# Patient Record
Sex: FEMALE | Race: WHITE | Employment: FULL TIME | URBAN - METROPOLITAN AREA
[De-identification: names, ages, dates, MRNs, and addresses within clinical notes are randomized per-mention and may not be internally consistent; named-entity substitution may affect disease eponyms.]

---

## 2018-07-21 ENCOUNTER — HOSPITAL ENCOUNTER (OUTPATIENT)
Age: 48
Discharge: HOME | End: 2018-07-21
Payer: COMMERCIAL

## 2018-07-21 LAB
ALT SERPL-CCNC: 48 U/L (ref 0–32)
AST SERPL-CCNC: 84 U/L (ref 0–31)
CREAT SERPL-MCNC: 0.7 MG/DL (ref 0.5–1)
GFR AFRICAN AMERICAN: >60
GFR NON-AFRICAN AMERICAN: >60 ML/MIN/1.73
HCT VFR BLD CALC: 38.8 % (ref 34–48)
HEMOGLOBIN: 13.5 G/DL (ref 11.5–15.5)
MCH RBC QN AUTO: 31.9 PG (ref 26–35)
MCHC RBC AUTO-ENTMCNC: 34.8 % (ref 32–34.5)
MCV RBC AUTO: 91.7 FL (ref 80–99.9)
PDW BLD-RTO: 13.2 FL (ref 11.5–15)
PLATELET # BLD: 332 E9/L (ref 130–450)
PMV BLD AUTO: 10.3 FL (ref 7–12)
RBC # BLD: 4.23 E12/L (ref 3.5–5.5)
SEDIMENTATION RATE, ERYTHROCYTE: 17 MM/HR (ref 0–20)
WBC # BLD: 7.4 E9/L (ref 4.5–11.5)

## 2018-07-21 PROCEDURE — 82565 ASSAY OF CREATININE: CPT

## 2018-07-21 PROCEDURE — 84450 TRANSFERASE (AST) (SGOT): CPT

## 2018-07-21 PROCEDURE — 84460 ALANINE AMINO (ALT) (SGPT): CPT

## 2018-07-21 PROCEDURE — 85027 COMPLETE CBC AUTOMATED: CPT

## 2018-07-21 PROCEDURE — 85651 RBC SED RATE NONAUTOMATED: CPT

## 2018-07-21 PROCEDURE — 36415 COLL VENOUS BLD VENIPUNCTURE: CPT

## 2018-07-25 ENCOUNTER — HOSPITAL ENCOUNTER (OUTPATIENT)
Dept: MAMMOGRAPHY | Age: 48
Discharge: HOME | End: 2018-07-27
Payer: COMMERCIAL

## 2018-07-25 DIAGNOSIS — Z12.31 SCREENING MAMMOGRAM, ENCOUNTER FOR: ICD-10-CM

## 2018-07-25 PROCEDURE — 77063 BREAST TOMOSYNTHESIS BI: CPT

## 2018-08-17 ENCOUNTER — HOSPITAL ENCOUNTER (OUTPATIENT)
Age: 48
Discharge: HOME | End: 2018-08-17
Payer: COMMERCIAL

## 2018-08-17 LAB
ALBUMIN SERPL-MCNC: 3.8 G/DL (ref 3.5–5.2)
ALP BLD-CCNC: 42 U/L (ref 35–104)
ALT SERPL-CCNC: 15 U/L (ref 0–32)
AST SERPL-CCNC: 22 U/L (ref 0–31)
BILIRUB SERPL-MCNC: 0.6 MG/DL (ref 0–1.2)
BILIRUBIN DIRECT: <0.2 MG/DL (ref 0–0.3)
BILIRUBIN, INDIRECT: NORMAL MG/DL (ref 0–1)
TOTAL PROTEIN: 7 G/DL (ref 6.4–8.3)

## 2018-08-17 PROCEDURE — 36415 COLL VENOUS BLD VENIPUNCTURE: CPT

## 2018-08-17 PROCEDURE — 80076 HEPATIC FUNCTION PANEL: CPT

## 2019-01-19 ENCOUNTER — HOSPITAL ENCOUNTER (OUTPATIENT)
Age: 49
Discharge: HOME | End: 2019-01-19
Payer: COMMERCIAL

## 2019-01-19 LAB
ALBUMIN SERPL-MCNC: 4.1 G/DL (ref 3.5–5.2)
ALP BLD-CCNC: 46 U/L (ref 35–104)
ALT SERPL-CCNC: 15 U/L (ref 0–32)
ANION GAP SERPL CALCULATED.3IONS-SCNC: 12 MMOL/L (ref 7–16)
AST SERPL-CCNC: 21 U/L (ref 0–31)
BILIRUB SERPL-MCNC: 0.9 MG/DL (ref 0–1.2)
BUN BLDV-MCNC: 12 MG/DL (ref 6–20)
CALCIUM SERPL-MCNC: 8.8 MG/DL (ref 8.6–10.2)
CHLORIDE BLD-SCNC: 101 MMOL/L (ref 98–107)
CHOLESTEROL, FASTING: 225 MG/DL (ref 0–199)
CO2: 26 MMOL/L (ref 22–29)
CREAT SERPL-MCNC: 0.7 MG/DL (ref 0.5–1)
GFR AFRICAN AMERICAN: >60
GFR NON-AFRICAN AMERICAN: >60 ML/MIN/1.73
GLUCOSE FASTING: 102 MG/DL (ref 74–99)
HCT VFR BLD CALC: 40 % (ref 34–48)
HDLC SERPL-MCNC: 70 MG/DL
HEMOGLOBIN: 13.7 G/DL (ref 11.5–15.5)
LDL CHOLESTEROL CALCULATED: 143 MG/DL (ref 0–99)
MCH RBC QN AUTO: 31.4 PG (ref 26–35)
MCHC RBC AUTO-ENTMCNC: 34.3 % (ref 32–34.5)
MCV RBC AUTO: 91.5 FL (ref 80–99.9)
PDW BLD-RTO: 12.8 FL (ref 11.5–15)
PLATELET # BLD: 355 E9/L (ref 130–450)
PMV BLD AUTO: 10 FL (ref 7–12)
POTASSIUM SERPL-SCNC: 4.2 MMOL/L (ref 3.5–5)
RBC # BLD: 4.37 E12/L (ref 3.5–5.5)
SEDIMENTATION RATE, ERYTHROCYTE: 20 MM/HR (ref 0–20)
SODIUM BLD-SCNC: 139 MMOL/L (ref 132–146)
TOTAL PROTEIN: 7.8 G/DL (ref 6.4–8.3)
TRIGLYCERIDE, FASTING: 61 MG/DL (ref 0–149)
VLDLC SERPL CALC-MCNC: 12 MG/DL
WBC # BLD: 6.2 E9/L (ref 4.5–11.5)

## 2019-01-19 PROCEDURE — 85651 RBC SED RATE NONAUTOMATED: CPT

## 2019-01-19 PROCEDURE — 80053 COMPREHEN METABOLIC PANEL: CPT

## 2019-01-19 PROCEDURE — 85027 COMPLETE CBC AUTOMATED: CPT

## 2019-01-19 PROCEDURE — 36415 COLL VENOUS BLD VENIPUNCTURE: CPT

## 2019-01-19 PROCEDURE — 80061 LIPID PANEL: CPT

## 2019-05-28 ENCOUNTER — HOSPITAL ENCOUNTER (OUTPATIENT)
Age: 49
Discharge: HOME | End: 2019-05-28
Payer: COMMERCIAL

## 2019-05-28 LAB
ALT SERPL-CCNC: 18 U/L (ref 0–32)
AST SERPL-CCNC: 20 U/L (ref 0–31)
CREAT SERPL-MCNC: 0.9 MG/DL (ref 0.5–1)
GFR AFRICAN AMERICAN: >60
GFR NON-AFRICAN AMERICAN: >60 ML/MIN/1.73
HCT VFR BLD CALC: 39.1 % (ref 34–48)
HEMOGLOBIN: 13.2 G/DL (ref 11.5–15.5)
MCH RBC QN AUTO: 31.2 PG (ref 26–35)
MCHC RBC AUTO-ENTMCNC: 33.8 % (ref 32–34.5)
MCV RBC AUTO: 92.4 FL (ref 80–99.9)
PDW BLD-RTO: 13 FL (ref 11.5–15)
PLATELET # BLD: 377 E9/L (ref 130–450)
PMV BLD AUTO: 9.4 FL (ref 7–12)
RBC # BLD: 4.23 E12/L (ref 3.5–5.5)
SEDIMENTATION RATE, ERYTHROCYTE: 22 MM/HR (ref 0–20)
WBC # BLD: 8.2 E9/L (ref 4.5–11.5)

## 2019-05-28 PROCEDURE — 82565 ASSAY OF CREATININE: CPT

## 2019-05-28 PROCEDURE — 84450 TRANSFERASE (AST) (SGOT): CPT

## 2019-05-28 PROCEDURE — 84460 ALANINE AMINO (ALT) (SGPT): CPT

## 2019-05-28 PROCEDURE — 36415 COLL VENOUS BLD VENIPUNCTURE: CPT

## 2019-05-28 PROCEDURE — 85027 COMPLETE CBC AUTOMATED: CPT

## 2019-05-28 PROCEDURE — 85651 RBC SED RATE NONAUTOMATED: CPT

## 2019-07-30 ENCOUNTER — HOSPITAL ENCOUNTER (OUTPATIENT)
Dept: MAMMOGRAPHY | Age: 49
Discharge: HOME | End: 2019-08-01
Payer: COMMERCIAL

## 2019-07-30 DIAGNOSIS — Z12.39 BREAST CANCER SCREENING: ICD-10-CM

## 2019-07-30 PROCEDURE — 77067 SCR MAMMO BI INCL CAD: CPT

## 2019-08-06 ENCOUNTER — HOSPITAL ENCOUNTER (OUTPATIENT)
Dept: MAMMOGRAPHY | Age: 49
Discharge: HOME | End: 2019-08-08
Payer: COMMERCIAL

## 2019-08-06 ENCOUNTER — HOSPITAL ENCOUNTER (OUTPATIENT)
Dept: ULTRASOUND IMAGING | Age: 49
End: 2019-08-06
Payer: COMMERCIAL

## 2019-08-06 DIAGNOSIS — N64.89 BREAST ASYMMETRY: ICD-10-CM

## 2019-08-06 PROCEDURE — 77065 DX MAMMO INCL CAD UNI: CPT

## 2019-12-07 ENCOUNTER — HOSPITAL ENCOUNTER (OUTPATIENT)
Age: 49
Discharge: HOME | End: 2019-12-07
Payer: COMMERCIAL

## 2019-12-07 LAB
ALT SERPL-CCNC: 14 U/L (ref 0–32)
AST SERPL-CCNC: 20 U/L (ref 0–31)
CREAT SERPL-MCNC: 0.7 MG/DL (ref 0.5–1)
GFR AFRICAN AMERICAN: >60
GFR NON-AFRICAN AMERICAN: >60 ML/MIN/1.73
HCT VFR BLD CALC: 38.5 % (ref 34–48)
HEMOGLOBIN: 13.2 G/DL (ref 11.5–15.5)
MCH RBC QN AUTO: 31.5 PG (ref 26–35)
MCHC RBC AUTO-ENTMCNC: 34.3 % (ref 32–34.5)
MCV RBC AUTO: 91.9 FL (ref 80–99.9)
PDW BLD-RTO: 13 FL (ref 11.5–15)
PLATELET # BLD: 314 E9/L (ref 130–450)
PMV BLD AUTO: 9.6 FL (ref 7–12)
RBC # BLD: 4.19 E12/L (ref 3.5–5.5)
SEDIMENTATION RATE, ERYTHROCYTE: 16 MM/HR (ref 0–20)
WBC # BLD: 7 E9/L (ref 4.5–11.5)

## 2019-12-07 PROCEDURE — 82565 ASSAY OF CREATININE: CPT

## 2019-12-07 PROCEDURE — 36415 COLL VENOUS BLD VENIPUNCTURE: CPT

## 2019-12-07 PROCEDURE — 84450 TRANSFERASE (AST) (SGOT): CPT

## 2019-12-07 PROCEDURE — 85651 RBC SED RATE NONAUTOMATED: CPT

## 2019-12-07 PROCEDURE — 85027 COMPLETE CBC AUTOMATED: CPT

## 2019-12-07 PROCEDURE — 84460 ALANINE AMINO (ALT) (SGPT): CPT

## 2020-04-27 ENCOUNTER — HOSPITAL ENCOUNTER (OUTPATIENT)
Age: 50
Discharge: HOME | End: 2020-04-29
Payer: COMMERCIAL

## 2020-04-27 LAB
ALT SERPL-CCNC: 21 U/L (ref 0–32)
AST SERPL-CCNC: 25 U/L (ref 0–31)
CREAT SERPL-MCNC: 0.7 MG/DL (ref 0.5–1)
GFR AFRICAN AMERICAN: >60
GFR NON-AFRICAN AMERICAN: >60 ML/MIN/1.73
HCT VFR BLD CALC: 39.8 % (ref 34–48)
HEMOGLOBIN: 13 G/DL (ref 11.5–15.5)
MCH RBC QN AUTO: 31 PG (ref 26–35)
MCHC RBC AUTO-ENTMCNC: 32.7 % (ref 32–34.5)
MCV RBC AUTO: 94.8 FL (ref 80–99.9)
PDW BLD-RTO: 13.5 FL (ref 11.5–15)
PLATELET # BLD: 368 E9/L (ref 130–450)
PMV BLD AUTO: 10.5 FL (ref 7–12)
RBC # BLD: 4.2 E12/L (ref 3.5–5.5)
SEDIMENTATION RATE, ERYTHROCYTE: 20 MM/HR (ref 0–20)
WBC # BLD: 7 E9/L (ref 4.5–11.5)

## 2020-04-27 PROCEDURE — 84450 TRANSFERASE (AST) (SGOT): CPT

## 2020-04-27 PROCEDURE — 36415 COLL VENOUS BLD VENIPUNCTURE: CPT

## 2020-04-27 PROCEDURE — 82565 ASSAY OF CREATININE: CPT

## 2020-04-27 PROCEDURE — 85651 RBC SED RATE NONAUTOMATED: CPT

## 2020-04-27 PROCEDURE — 84460 ALANINE AMINO (ALT) (SGPT): CPT

## 2020-04-27 PROCEDURE — 85027 COMPLETE CBC AUTOMATED: CPT

## 2020-08-04 ENCOUNTER — HOSPITAL ENCOUNTER (OUTPATIENT)
Dept: MAMMOGRAPHY | Age: 50
Discharge: HOME | End: 2020-08-06
Payer: COMMERCIAL

## 2020-08-04 PROCEDURE — 77067 SCR MAMMO BI INCL CAD: CPT

## 2020-10-26 ENCOUNTER — APPOINTMENT (OUTPATIENT)
Dept: GENERAL RADIOLOGY | Age: 50
End: 2020-10-26
Payer: COMMERCIAL

## 2020-10-26 ENCOUNTER — HOSPITAL ENCOUNTER (EMERGENCY)
Age: 50
Discharge: HOME | End: 2020-10-26
Attending: EMERGENCY MEDICINE
Payer: COMMERCIAL

## 2020-10-26 ENCOUNTER — HOSPITAL ENCOUNTER (OUTPATIENT)
Age: 50
Discharge: HOME | End: 2020-10-28
Payer: COMMERCIAL

## 2020-10-26 VITALS
RESPIRATION RATE: 19 BRPM | TEMPERATURE: 97.2 F | OXYGEN SATURATION: 97 % | DIASTOLIC BLOOD PRESSURE: 79 MMHG | SYSTOLIC BLOOD PRESSURE: 132 MMHG | HEART RATE: 89 BPM

## 2020-10-26 LAB
ALT SERPL-CCNC: 16 U/L (ref 0–32)
AST SERPL-CCNC: 22 U/L (ref 0–31)
CREAT SERPL-MCNC: 0.6 MG/DL (ref 0.5–1)
GFR AFRICAN AMERICAN: >60
GFR NON-AFRICAN AMERICAN: >60 ML/MIN/1.73
HCT VFR BLD CALC: 41.1 % (ref 34–48)
HEMOGLOBIN: 13.7 G/DL (ref 11.5–15.5)
MCH RBC QN AUTO: 30.5 PG (ref 26–35)
MCHC RBC AUTO-ENTMCNC: 33.3 % (ref 32–34.5)
MCV RBC AUTO: 91.5 FL (ref 80–99.9)
PDW BLD-RTO: 13.3 FL (ref 11.5–15)
PLATELET # BLD: 395 E9/L (ref 130–450)
PMV BLD AUTO: 10.4 FL (ref 7–12)
RBC # BLD: 4.49 E12/L (ref 3.5–5.5)
SEDIMENTATION RATE, ERYTHROCYTE: 26 MM/HR (ref 0–20)
WBC # BLD: 5.4 E9/L (ref 4.5–11.5)

## 2020-10-26 PROCEDURE — 99283 EMERGENCY DEPT VISIT LOW MDM: CPT

## 2020-10-26 PROCEDURE — 84450 TRANSFERASE (AST) (SGOT): CPT

## 2020-10-26 PROCEDURE — 73130 X-RAY EXAM OF HAND: CPT

## 2020-10-26 PROCEDURE — 6370000000 HC RX 637 (ALT 250 FOR IP): Performed by: EMERGENCY MEDICINE

## 2020-10-26 PROCEDURE — 36415 COLL VENOUS BLD VENIPUNCTURE: CPT

## 2020-10-26 PROCEDURE — 85027 COMPLETE CBC AUTOMATED: CPT

## 2020-10-26 PROCEDURE — 82565 ASSAY OF CREATININE: CPT

## 2020-10-26 PROCEDURE — 85651 RBC SED RATE NONAUTOMATED: CPT

## 2020-10-26 PROCEDURE — 84460 ALANINE AMINO (ALT) (SGPT): CPT

## 2020-10-26 RX ORDER — PREDNISONE 10 MG/1
40 TABLET ORAL DAILY
Qty: 20 TABLET | Refills: 0 | Status: SHIPPED | OUTPATIENT
Start: 2020-10-26 | End: 2020-10-31

## 2020-10-26 RX ORDER — HYDROCODONE BITARTRATE AND ACETAMINOPHEN 5; 325 MG/1; MG/1
1 TABLET ORAL ONCE
Status: COMPLETED | OUTPATIENT
Start: 2020-10-26 | End: 2020-10-26

## 2020-10-26 RX ORDER — PREDNISONE 20 MG/1
60 TABLET ORAL ONCE
Status: COMPLETED | OUTPATIENT
Start: 2020-10-26 | End: 2020-10-26

## 2020-10-26 RX ORDER — IBUPROFEN 800 MG/1
800 TABLET ORAL ONCE
Status: COMPLETED | OUTPATIENT
Start: 2020-10-26 | End: 2020-10-26

## 2020-10-26 RX ADMIN — PREDNISONE 60 MG: 20 TABLET ORAL at 02:48

## 2020-10-26 RX ADMIN — HYDROCODONE BITARTRATE AND ACETAMINOPHEN 1 TABLET: 5; 325 TABLET ORAL at 03:56

## 2020-10-26 RX ADMIN — IBUPROFEN 800 MG: 800 TABLET, FILM COATED ORAL at 02:48

## 2020-10-26 ASSESSMENT — PAIN SCALES - GENERAL: PAINLEVEL_OUTOF10: 8

## 2020-10-26 ASSESSMENT — ENCOUNTER SYMPTOMS: COLOR CHANGE: 0

## 2020-10-26 NOTE — ED TRIAGE NOTES
Pt reports left hand pain, stiffness and numbness since yesterday afternoon. Pt reports hx of carpal tunnel and arthritis.

## 2020-10-26 NOTE — ED PROVIDER NOTES
Patient presents to the ED for evaluation of left hand pain. States most the pain is in her fingers specifically her thumb and index finger. She denies any trauma. She states that the pain is worse with movement. Has associated numbness. She states she also has carpal tunnel and thinks it may be secondary to that. Sometimes the pain radiates up into her arm. She denies any swelling or redness. Denies fevers or chills. She is right-handed. She does use this hand at work but states no more than her other hand. Not taking anything for pain control. Pain started at 3:00 yesterday afternoon. The pain was gradual in onset. Pain is now waxing and waning and is again worse with movement or palpation. Review of Systems   Musculoskeletal: Positive for arthralgias ( History of rheumatoid arthritis). Negative for joint swelling. Skin: Negative for color change and wound. Neurological: Positive for numbness. Negative for weakness. Physical Exam  Vitals signs and nursing note reviewed. Constitutional:       General: She is not in acute distress. Appearance: She is well-developed and normal weight. HENT:      Head: Normocephalic and atraumatic. Eyes:      Conjunctiva/sclera: Conjunctivae normal.   Neck:      Musculoskeletal: Normal range of motion and neck supple. Cardiovascular:      Rate and Rhythm: Normal rate. Pulmonary:      Effort: Pulmonary effort is normal.   Musculoskeletal:      Comments: Negative Phalen's and Tinel's sign. There is no edema of the right hand specifically at the thumb or index finger. There is no overlying erythema. There is no increased warmth to palpation. No bony crepitance. The area is mentioned are tender to palpation. No signs of wounds or other findings consistent with infection. Skin:     General: Skin is warm and dry. Findings: No bruising or erythema.    Neurological:      Mental Status: She is alert and oriented to person, place, and time. Procedures     MDM   Patient presented to the ED with a complaint of left hand pain with occasional paresthesias. Imaging was obtained which showed no acute bony pathology. She does have a history of rheumatoid arthritis and does follow with a rheumatologist.  I discussed that I would like her to follow-up with her rheumatologist for further treatment and evaluation. I will place her on a steroid burst and advised her that if she has worsening symptoms or new concerns that she can return to the ED for further evaluation.    --------------------------------------------- PAST HISTORY ---------------------------------------------  Past Medical History:  has a past medical history of Arthritis. Past Surgical History:  has no past surgical history on file. Social History:  reports that she has never smoked. She does not have any smokeless tobacco history on file. She reports current alcohol use of about 4.0 standard drinks of alcohol per week. She reports that she does not use drugs. Family History: family history includes Arthritis in her father; Diabetes in her father; Other in her father and mother. The patients home medications have been reviewed. Allergies: Amoxicillin    -------------------------------------------------- RESULTS -------------------------------------------------  Labs:  No results found for this visit on 10/26/20. Radiology:  XR HAND LEFT (MIN 3 VIEWS)   Final Result   No acute bony abnormality of the hand.             ------------------------- NURSING NOTES AND VITALS REVIEWED ---------------------------  Date / Time Roomed:  10/26/2020  2:06 AM  ED Bed Assignment:  13/13    The nursing notes within the ED encounter and vital signs as below have been reviewed.    /78   Pulse 84   Temp 97.2 °F (36.2 °C) (Temporal)   Resp 18   SpO2 96%   Oxygen Saturation Interpretation: Normal      ------------------------------------------ PROGRESS NOTES ------------------------------------------  I have spoken with the patient and discussed todays results, in addition to providing specific details for the plan of care and counseling regarding the diagnosis and prognosis. Their questions are answered at this time and they are agreeable with the plan. I discussed at length with them reasons for immediate return here for re evaluation. They will followup with primary care by calling their office tomorrow. --------------------------------- ADDITIONAL PROVIDER NOTES ---------------------------------  At this time the patient is without objective evidence of an acute process requiring hospitalization or inpatient management. They have remained hemodynamically stable throughout their entire ED visit and are stable for discharge with outpatient follow-up. The plan has been discussed in detail and they are aware of the specific conditions for emergent return, as well as the importance of follow-up. New Prescriptions    PREDNISONE (DELTASONE) 10 MG TABLET    Take 4 tablets by mouth daily for 5 days       Diagnosis:  1. Left hand pain        Disposition:  Patient's disposition: Discharge to home  Patient's condition is stable.            Radha Gaffney DO  10/26/20 8119

## 2021-01-16 ENCOUNTER — HOSPITAL ENCOUNTER (OUTPATIENT)
Age: 51
Discharge: HOME | End: 2021-01-16
Payer: COMMERCIAL

## 2021-01-16 LAB
ALBUMIN SERPL-MCNC: 3.6 G/DL (ref 3.5–5.2)
ALP BLD-CCNC: 55 U/L (ref 35–104)
ALT SERPL-CCNC: 17 U/L (ref 0–32)
ANION GAP SERPL CALCULATED.3IONS-SCNC: 9 MMOL/L (ref 7–16)
AST SERPL-CCNC: 20 U/L (ref 0–31)
BILIRUB SERPL-MCNC: 0.5 MG/DL (ref 0–1.2)
BUN BLDV-MCNC: 12 MG/DL (ref 6–20)
CALCIUM SERPL-MCNC: 8.4 MG/DL (ref 8.6–10.2)
CHLORIDE BLD-SCNC: 101 MMOL/L (ref 98–107)
CO2: 26 MMOL/L (ref 22–29)
CREAT SERPL-MCNC: 0.8 MG/DL (ref 0.5–1)
GFR AFRICAN AMERICAN: >60
GFR NON-AFRICAN AMERICAN: >60 ML/MIN/1.73
GLUCOSE BLD-MCNC: 121 MG/DL (ref 74–99)
HCT VFR BLD CALC: 38.8 % (ref 34–48)
HEMOGLOBIN: 12.9 G/DL (ref 11.5–15.5)
MCH RBC QN AUTO: 29.7 PG (ref 26–35)
MCHC RBC AUTO-ENTMCNC: 33.2 % (ref 32–34.5)
MCV RBC AUTO: 89.4 FL (ref 80–99.9)
PDW BLD-RTO: 13.1 FL (ref 11.5–15)
PLATELET # BLD: 363 E9/L (ref 130–450)
PMV BLD AUTO: 9.1 FL (ref 7–12)
POTASSIUM SERPL-SCNC: 4.1 MMOL/L (ref 3.5–5)
RBC # BLD: 4.34 E12/L (ref 3.5–5.5)
SEDIMENTATION RATE, ERYTHROCYTE: 20 MM/HR (ref 0–20)
SODIUM BLD-SCNC: 136 MMOL/L (ref 132–146)
TOTAL PROTEIN: 7.4 G/DL (ref 6.4–8.3)
WBC # BLD: 5.9 E9/L (ref 4.5–11.5)

## 2021-01-16 PROCEDURE — 80053 COMPREHEN METABOLIC PANEL: CPT

## 2021-01-16 PROCEDURE — 85651 RBC SED RATE NONAUTOMATED: CPT

## 2021-01-16 PROCEDURE — 85027 COMPLETE CBC AUTOMATED: CPT

## 2021-01-16 PROCEDURE — 36415 COLL VENOUS BLD VENIPUNCTURE: CPT

## 2021-01-17 ENCOUNTER — HOSPITAL ENCOUNTER (EMERGENCY)
Age: 51
Discharge: HOME | End: 2021-01-17
Payer: COMMERCIAL

## 2021-01-17 VITALS
DIASTOLIC BLOOD PRESSURE: 85 MMHG | BODY MASS INDEX: 33.75 KG/M2 | HEIGHT: 66 IN | RESPIRATION RATE: 20 BRPM | TEMPERATURE: 97.7 F | WEIGHT: 210 LBS | OXYGEN SATURATION: 97 % | HEART RATE: 77 BPM | SYSTOLIC BLOOD PRESSURE: 154 MMHG

## 2021-01-17 DIAGNOSIS — G56.02 CARPAL TUNNEL SYNDROME OF LEFT WRIST: Primary | ICD-10-CM

## 2021-01-17 PROCEDURE — L3931 WHFO NONTORSION JOINT PREFAB: HCPCS

## 2021-01-17 PROCEDURE — 6370000000 HC RX 637 (ALT 250 FOR IP): Performed by: PHYSICIAN ASSISTANT

## 2021-01-17 PROCEDURE — 99212 OFFICE O/P EST SF 10 MIN: CPT

## 2021-01-17 RX ORDER — CELECOXIB 200 MG/1
200 CAPSULE ORAL 2 TIMES DAILY
COMMUNITY
End: 2021-08-10

## 2021-01-17 RX ORDER — IBUPROFEN 400 MG/1
800 TABLET ORAL ONCE
Status: COMPLETED | OUTPATIENT
Start: 2021-01-17 | End: 2021-01-17

## 2021-01-17 RX ADMIN — IBUPROFEN 800 MG: 400 TABLET, FILM COATED ORAL at 09:00

## 2021-01-17 ASSESSMENT — PAIN SCALES - GENERAL
PAINLEVEL_OUTOF10: 5
PAINLEVEL_OUTOF10: 5

## 2021-01-17 ASSESSMENT — PAIN DESCRIPTION - ORIENTATION: ORIENTATION: LEFT

## 2021-01-17 NOTE — ED PROVIDER NOTES
[id number]  Department of Emergency Medicine   ED  Encounter Note  Admit Date/RoomTime: 2021  8:42 AM  ED Room:     NAME: Savannah Lamar  : 1970  MRN: [id number]     Chief Complaint:  Hand Pain (started  month ago  with left hand pain   and numbness   has been to hospital   before  )    History of Present Illness       Savannah Lamar is a 54 y.o. old female presenting to the emergency department with a complaint of left hand pain and numbness. Patient states she has rheumatoid arthritis. In addition she has carpal tunnel to both wrists, worse on the left. Her left wrist and hand has actually been an issue for several months. She has been to [facility]. She is also been to her rheumatologist.  And orthopedic doctor. She recently had nerve conduction study done and was told she has bad carpal tunnel to both wrists, worse on the left. At home she takes Motrin every once in a while. In addition she is on Celebrex. She states that those medications are not helping her pain. And she cannot find her wrist splint. There has been no new injury to her left wrist or hand. ROS   Pertinent positives and negatives are stated within HPI, all other systems reviewed and are negative. Past Medical History:  has a past medical history of Arthritis. Surgical History:  has no past surgical history on file. Social History:  reports that she has never smoked. She has never used smokeless tobacco. She reports current alcohol use of about 4.0 standard drinks of alcohol per week. She reports that she does not use drugs. Family History: family history includes Arthritis in her father; Diabetes in her father; Other in her father and mother.      Allergies: Amoxicillin    Physical Exam   Oxygen Saturation Interpretation: Normal.        ED Triage Vitals [21 0844]   BP Temp Temp Source Pulse Resp SpO2 Height Weight   (!) 154/85 97.7 °F (36.5 °C) Infrared 77 20 97 % 5' 6\" (1.676 m) 210 lb (95.3 kg)         General:  NAD. Alert and Oriented. Well-appearing. Skin:  Warm, dry. No rashes. Head:  Normocephalic. Atraumatic. Eyes:  EOMI. Conjunctiva normal.  ENT:  Oral mucosa moist.  Airway patent. Neck:  Supple. Normal ROM. Respiratory:  No respiratory distress. No labored breathing. Lungs clear without rales, rhonchi or wheezing. Cardiovascular:  Regular rate. No Murmur. No peripheral edema. Extremities warm and good color. Extremities: Left hand is not swollen, not erythematous, not warm to the touch. Range of motion is intact with flexion and extension of all fingers. Left wrist is not swollen, not erythematous, not warm to the touch. Flexion and extension is intact at the left wrist.  2+ left radial pulse. Holding direct pressure over the volar aspect of the left wrist does exacerbate her pain. Back:  Normal ROM. Nontender to palpation. Neuro:  Alert and Oriented to person, place, time and situation. Normal LOC. Moves all extremities. Speech fluent. Psych:  Calm and Cooperative. Normal thought process. Normal judgement. Lab / Imaging Results   (All laboratory and radiology results have been personally reviewed by myself)  Labs:  No results found for this visit on 01/17/21. Imaging: All Radiology results interpreted by Radiologist unless otherwise noted. No orders to display     ED Course / Medical Decision Making     Medications   ibuprofen (ADVIL;MOTRIN) tablet 800 mg (800 mg Oral Given 1/17/21 0900)        Re-examination:  1/17/21       Time:        Consult(s):   None    Procedure(s):   Post splint examination:  Splint has been applied by ED tech and/or Rn. Post-splint check and neuro exam performed by myself. Splint is appropriately applied. Neurovascular intact with good pulse, normal color and warm extremity. Instructions on what to watch for vascular compromise explained to patient and/or family at bedside.   Explained to patient and/or family this is a temporary splint and they will need to be reevaluated by Ortho specialty or their PCP. Patient and her family understand they can return to the ED at anytime for any concerns regarding extremity problem and/or splint. MDM:    Imaging was not obtained based on no suspicion for fracture, dislocation as per history/physical findings. Plan is subsequently for symptom control, limited use and  immobilization with appropriate outpatient follow-up. Plan of Care/Counseling:  I reviewed today's visit with the patient in addition to providing specific details for the plan of care and counseling regarding the diagnosis and prognosis. Questions are answered at this time and are agreeable with the plan. Wrist splint was applied here at urgent care. In addition she was given an anti-inflammatory. Patient was advised that ultimately she needs to get the surgery for carpal tunnel syndrome. Assessment      1. Carpal tunnel syndrome of left wrist Stable, but not controlled     Plan   Discharged to  home. Patient condition is good    New Medications     New Prescriptions    No medications on file     Electronically signed by CITLALI Ying   DD: 1/17/21  **This report was transcribed using voice recognition software. Every effort was made to ensure accuracy; however, inadvertent computerized transcription errors may be present.   END OF ED PROVIDER NOTE       Chidi Ying  01/17/21 0900

## 2021-01-25 ENCOUNTER — OFFICE VISIT (OUTPATIENT)
Dept: ORTHOPEDIC SURGERY | Age: 51
End: 2021-01-25
Payer: COMMERCIAL

## 2021-01-25 VITALS — HEIGHT: 66 IN | WEIGHT: 210 LBS | BODY MASS INDEX: 33.75 KG/M2

## 2021-01-25 DIAGNOSIS — G56.01 RIGHT CARPAL TUNNEL SYNDROME: Primary | ICD-10-CM

## 2021-01-25 DIAGNOSIS — G56.02 LEFT CARPAL TUNNEL SYNDROME: ICD-10-CM

## 2021-01-25 PROCEDURE — 99204 OFFICE O/P NEW MOD 45 MIN: CPT | Performed by: ORTHOPAEDIC SURGERY

## 2021-01-25 NOTE — PROGRESS NOTES
Chief Complaint   Patient presents with    Wrist Pain     Left carpal tunnel. C/o numbness in all fingers except the small finger. SHe has tried bracing without relief. Non dominant hand. Emilie Ge is a 54y.o. year old  female who presents for evaluation of bilateral wrist pain L>R.  she reports this started 20 yrs ago, but much worse over the past 2 months. she does remember a specific injury that started the pain. The injury was none. The pain is located mainly activity. The pain is worse with activityand better with rest.  The patient has tried splint, nsaids. The treatment has not been effective. The patient is right dominant. 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 Past Medical History:   Diagnosis Date    Arthritis      No past surgical history on file. Current Outpatient Medications:     celecoxib (CELEBREX) 200 MG capsule, Take 200 mg by mouth 2 times daily, Disp: , Rfl:     methotrexate (RHEUMATREX) 2.5 MG chemo tablet, Take 2.5 mg by mouth once a week., Disp: , Rfl:     folic acid (FOLVITE) 1 MG tablet, Take 1 mg by mouth daily. , Disp: , Rfl:   Allergies   Allergen Reactions    Amoxicillin      Social History     Socioeconomic History    Marital status: Single     Spouse name: Not on file    Number of children: Not on file    Years of education: 25    Highest education level: Not on file   Occupational History    Occupation:      Comment: [id number] [employer] Needs    Financial resource strain: Not on file    Food insecurity     Worry: Not on file     Inability: Not on file   [employer] needs     Medical: Not on file     Non-medical: Not on file   Tobacco Use    Smoking status: Never Smoker    Smokeless tobacco: Never Used   Substance and Sexual Activity    Alcohol use:  Yes     Alcohol/week: 4.0 standard drinks     Types: 4 Cans of beer per week     Comment: social    Drug use: No    Sexual activity: Yes Partners: Male   Lifestyle    Physical activity     Days per week: Not on file     Minutes per session: Not on file    Stress: Not on file   Relationships    Social connections     Talks on phone: Not on file     Gets together: Not on file     Attends Church service: Not on file     Active member of club or organization: Not on file     Attends meetings of clubs or organizations: Not on file     Relationship status: Not on file    Intimate partner violence     Fear of current or ex partner: Not on file     Emotionally abused: Not on file     Physically abused: Not on file     Forced sexual activity: Not on file   Other Topics Concern    Not on file   Social History Narrative    Not on file     Family History   Problem Relation Age of Onset    Other Mother     Arthritis Father     Diabetes Father     Other Father        REVIEW OF SYSTEMS:     General/Constitution:  (-)weight loss, (-)fever, (-)chills, (-)weakness. Skin: (-) rash,(-) psoriasis,(-) eczema, (-)skin cancer. Musculoskeletal: (-) fractures,  (-) dislocations,(-) collagen vascular disease, (-) fibromyalgia, (-) multiple sclerosis, (-) muscular dystrophy, (-) RSD,(-) joint pain (-)swelling, (-) joint pain,swelling. Neurologic: (-) epilepsy, (-)seizures,(-) brain tumor,(-) TIA, (-)stroke, (-)headaches, (-)Parkinson disease,(-) memory loss, (-) LOC. Cardiovascular: (-) Chest pain, (-) swelling in legs/feet, (-) SOB, (-) cramping in legs/feet with walking. Respiratory: (-) SOB, (-) Coughing, (-) night sweats. GI: (-) nausea, (-) vomiting, (-) diarrhea, (-) blood in stool, (-) gastric ulcer. Psychiatric: (-) Depression, (-) Anxiety, (-) bipolar disease, (-) Alzheimer's Disease  Allergic/Immunologic: (-) allergies latex, (-) allergies metal, (-) skin sensitivity.   Hematlogic: (-) anemia, (-) blood transfusion, (-) DVT/PE, (-) Clotting disorders      Subjective:  _Ht 5' 6\" (1.676 m)   Wt 210 lb (95.3 kg)   LMP 01/03/2021   BMI 33.89 kg/m² Vital signs are stable. In general, patient is awake, alert and oriented X3, in no apparent distress. Examination of HENT reveals normocephalic, atraumatic. PERRLA/EOMI sclera are white. Conjunctivae are clear. TM's are intact. Pharynx is pink and moist.  Uvula and tongue are midline. Heart: Positive S1 and positive S2 with regular rate and rhythm. Lungs: Clear to auscultation bilaterally without rales, rhonchi or wheezes. Abdomen: soft, nontender. Positive bowel sounds. No organomegaly. No guarding or rigidity. Constitution:  Ht 5' 6\" (1.676 m)   Wt 210 lb (95.3 kg)   LMP 01/03/2021   BMI 33.89 kg/m²     Psycihatric:  The patient is alert and oriented x 3, appears to be stated age and in no distress. Respiratory:  Respiratory effort is not labored. Patient is not gasping. Palpation of the chest reveals no tactile fremitus. Skin:  Upon inspection: the skin appears warm, dry and intact. There is not a previous scar over the affected area. There is not any cellulitis, lymphedema or cutaneous lesions noted in the lower extremities. Upon palpation there is no induration noted. Neurologic:  Motor exam of the upper extremities show: The reflexes in biceps/triceps/brachioradialis are equal and symmetric. Sensory exam C5-T1 are normal bilaterally. Cardiovascular: The vascular exam is normal and is well perfused to distal extremities. There are 2+ radial pulses bilaterally, and motor and sensation is intact to median, ulnar, and radial, musclocutaneus, and axillary nerve distribution and grossly symmetric bilaterally. There is cap refill noted less than two seconds in all digits. There is not edema of the bilateral upper extremities. There is not varicosities noted in the distal extremities. Lymph:  Upon palpation,  there is no lymphadenopathy noted in bilateral upper extremities.       Musculoskeletal:  Gait: normal; examination of the nails and digits reveal no cyanosis or clubbing. Cervical Exam:  On physical exam, Stefan Carrion is well-developed, well-nourished, oriented to person, place and time. her gait is normal.  On evaluation of her cervical spine, she has full range of motion of the cervical spine without pain. There is no cervical tenderness to palpation. Shoulder Exam:  On evaluation of her bilaterally upper extremities, her bilateral shoulder has no deformity. There is not evidence of scapular dyskinesis. There is not muscle atrophy in shoulder girdle. The range of motion for the Right Shoulder is 150/50/t8 and for the Left shoulder is 150/50/t8. Right shoulder Motor strength is 5/5 in the supraspinatus, 5/5 internal rotation and 5/5 in external rotation, and Left shoulder motor strength 5/5 in supraspinatus, 5/5 in internal rotation, 5/5 in external rotation. Elbow exam:  Evaluation of the elbow, reveals no signs of swelling or deformity. ROM is 0-140. There is not instability with varus/valgus stresses. Motor strength is 5/5 with flexion/extension. Wrist exam:  Inspection of the bilateral upper extremities, there is no evidence of deformity of the wrist.  ROM Wrist ROM R wrist DF 70, VF 80, L wrist DF 70, VF 80, R pronation 90/ supination 90, L pronation 90/supination 90. Motor strength is 5/5 with Dorsiflexion/Volarflexion/Supination/Pronation. Motor and sensation is intact and symmetric throughout the bilateral upper extremities in the median, ulnar and radial , musclcutaneous, and axillary nerve distributions. Hand exam:  The skin overlying the hand is  intact. There is not evidence of scar, lesion, laceration, or abrasion. The motion in the small joints of the hand are intact with no stiffness or deformity. The ROM in the MCP flexion 90/ extension 0 , PIP flexion 90/ extension 0, DIP flexion 70/ extension 0. There is not rotational deformity. There is no masses or adenopathy in bilateral upper extremities.   Radial pulses are 2+ and symmetric bilaterally. Capillary refill is intact and < 2 seconds. Motor strength is 5/5 with flexion and extension of the small finger joints. Right:  Phallens sign(+), Tinnells sign (+), Median nerve compression test (+),  Finklesteins (-), [employer] Grind test (-), [employer](-). Left:    Phallens sign(+), Tinnells sign (+), Median nerve compression test (+),  Finklesteins (-), [employer] Grind test (-), [employer](-). Xrays:   FINDINGS:   No acute fracture or dislocation. Alignment and joint spaces are maintained. Soft tissues are unremarkable. The bone mineralization is normal. No abnormal   calcifications are present. EMG: moderate carpal tuinnel    Radiographic findings reviewed with patient    Impression:   Encounter Diagnoses   Name Primary?  Right carpal tunnel syndrome Yes    Left carpal tunnel syndrome        Plan: Natural history and expected course discussed. Questions answered. Educational materials distributed. Rest, ice, compression, and elevation (RICE) therapy. Reduction in offending activity discussed. I had a lengthy discussion with the patient regarding their diagnosis. I explained treatment options including surgical vs non surgical treatment. I reviewed in detail the risks and benefits and outlined the procedure in detail with expected outcomes and possible complications. I also discussed non surgical treatment such as injections (CSI), physical therapy, topical creams and NSAID's. They have elected for surgical management at this time. We will perform a Left carpal tunnel release 2/12/2021. The risks and benefits were reviewed with the patient such as:  DVT, infection,  injuries to blood vessels and nerves, non relief of symptoms, continued pain, worsening of symptoms. The patient was counseled at length about the risks of tad Covid-19 during their perioperative period and any recovery window from their procedure.   The patient was made aware that tad Covid-19  may worsen their prognosis for recovering from their procedure  and lend to a higher morbidity and/or mortality risk. All material risks, benefits, and reasonable alternatives including postponing the procedure were discussed. The patient does wish to proceed with the procedure at this time.

## 2021-02-04 RX ORDER — VITAMIN B COMPLEX
1000 TABLET ORAL DAILY
COMMUNITY

## 2021-02-05 ENCOUNTER — HOSPITAL ENCOUNTER (OUTPATIENT)
Age: 51
Discharge: HOME | End: 2021-02-07
Payer: COMMERCIAL

## 2021-02-05 DIAGNOSIS — G56.02 CARPAL TUNNEL SYNDROME OF LEFT WRIST: ICD-10-CM

## 2021-02-05 PROCEDURE — U0003 INFECTIOUS AGENT DETECTION BY NUCLEIC ACID (DNA OR RNA); SEVERE ACUTE RESPIRATORY SYNDROME CORONAVIRUS 2 (SARS-COV-2) (CORONAVIRUS DISEASE [COVID-19]), AMPLIFIED PROBE TECHNIQUE, MAKING USE OF HIGH THROUGHPUT TECHNOLOGIES AS DESCRIBED BY CMS-2020-01-R: HCPCS

## 2021-02-06 LAB
SARS-COV-2: NOT DETECTED
SOURCE: NORMAL

## 2021-02-11 ENCOUNTER — ANESTHESIA EVENT (OUTPATIENT)
Dept: OPERATING ROOM | Age: 51
End: 2021-02-11
Payer: COMMERCIAL

## 2021-02-11 NOTE — ANESTHESIA PRE PROCEDURE
Department of Anesthesiology  Preprocedure Note       Name:  Robley Phalen   Age:  54 y.o.  :  1970                                          MRN:  [id number]         Date:  2021      Surgeon: Ann Brand): Jerardo Rob DO    Procedure: Procedure(s):  LEFT CARPAL TUNNEL RELEASE    Medications prior to admission:   Prior to Admission medications    Medication Sig Start Date End Date Taking? Authorizing Provider   HYDROcodone-acetaminophen (NORCO) 5-325 MG per tablet Take 1 tablet by mouth every 6 hours as needed for Pain for up to 7 days. Intended supply: 7 days. Take lowest dose possible to manage pain 21 Yes Jerardo Rob DO   Omega-3 Fatty Acids (FISH OIL PO) Take by mouth daily   Yes Historical Provider, MD   Vitamin D (CHOLECALCIFEROL) 25 MCG (1000 UT) TABS tablet Take 1,000 Units by mouth daily   Yes Historical Provider, MD   celecoxib (CELEBREX) 200 MG capsule Take 200 mg by mouth 2 times daily    Historical Provider, MD   methotrexate (RHEUMATREX) 2.5 MG chemo tablet Take 2.5 mg by mouth once a week 8 tabs  Q     Historical Provider, MD   folic acid (FOLVITE) 1 MG tablet Take 1 mg by mouth daily. Historical Provider, MD       Current medications:    Current Facility-Administered Medications   Medication Dose Route Frequency Provider Last Rate Last Admin    clindamycin (CLEOCIN) 900 mg in dextrose 5 % 50 mL IVPB  900 mg Intravenous Once [place], APRN - CNP        lactated ringers infusion   Intravenous Continuous Evy Rogel  mL/hr at 21 1212 New Bag at 21 1212       Allergies:     Allergies   Allergen Reactions    Amoxicillin Itching     Urine tract       Problem List:    Patient Active Problem List   Diagnosis Code    Carpal tunnel syndrome of left wrist G56.02       Past Medical History:        Diagnosis Date    Arthritis     rheumatoid        Past Surgical History:        Procedure Laterality Date    SINUS SURGERY 2005       Social History:    Social History     Tobacco Use    Smoking status: Never Smoker    Smokeless tobacco: Never Used   Substance Use Topics    Alcohol use: Yes     Comment: social                                Counseling given: Not Answered      Vital Signs (Current):   Vitals:    02/04/21 1544 02/12/21 1152 02/12/21 1154   BP:   133/87   Pulse:   76   Resp:   16   Temp:   97.7 °F (36.5 °C)   TempSrc:   Temporal   SpO2:   97%   Weight: 210 lb (95.3 kg) 221 lb (100.2 kg) 221 lb (100.2 kg)   Height: 5' 6\" (1.676 m) 5' 6\" (1.676 m) 5' 6\" (1.676 m)                                              BP Readings from Last 3 Encounters:   02/12/21 133/87   01/17/21 (!) 154/85   10/26/20 132/79       NPO Status: Time of last liquid consumption: 2300                        Time of last solid consumption: 1900                        Date of last liquid consumption: 02/11/21                        Date of last solid food consumption: 02/11/21    BMI:   Wt Readings from Last 3 Encounters:   02/12/21 221 lb (100.2 kg)   01/25/21 210 lb (95.3 kg)   01/17/21 210 lb (95.3 kg)     Body mass index is 35.67 kg/m². CBC:   Lab Results   Component Value Date    WBC 5.9 01/16/2021    RBC 4.34 01/16/2021    HGB 12.9 01/16/2021    HCT 38.8 01/16/2021    MCV 89.4 01/16/2021    RDW 13.1 01/16/2021     01/16/2021       CMP:   Lab Results   Component Value Date     01/16/2021    K 4.1 01/16/2021     01/16/2021    CO2 26 01/16/2021    BUN 12 01/16/2021    CREATININE 0.8 01/16/2021    GFRAA >60 01/16/2021    LABGLOM >60 01/16/2021    GLUCOSE 121 01/16/2021    PROT 7.4 01/16/2021    CALCIUM 8.4 01/16/2021    BILITOT 0.5 01/16/2021    ALKPHOS 55 01/16/2021    AST 20 01/16/2021    ALT 17 01/16/2021       POC Tests: No results for input(s): POCGLU, POCNA, POCK, POCCL, POCBUN, POCHEMO, POCHCT in the last 72 hours.     Coags: No results found for: PROTIME, INR, APTT    HCG (If Applicable):   Lab Results   Component Value Date    PREGTESTUR negative 05/03/2014        ABGs: No results found for: PHART, PO2ART, OLY6GGS, SWH1NBI, BEART, Y5JXMOQR     Type & Screen (If Applicable):  No results found for: LABABO, LABRH    Drug/Infectious Status (If Applicable):  No results found for: HIV, HEPCAB    COVID-19 Screening (If Applicable):   Lab Results   Component Value Date    COVID19 Not Detected 02/05/2021         Anesthesia Evaluation  Patient summary reviewed no history of anesthetic complications:   Airway: Mallampati: II  TM distance: >3 FB   Neck ROM: full  Mouth opening: > = 3 FB Dental: normal exam         Pulmonary:Negative Pulmonary ROS breath sounds clear to auscultation                             Cardiovascular:Negative CV ROS            Rhythm: regular  Rate: normal           Beta Blocker:  Not on Beta Blocker         Neuro/Psych:   Negative Neuro/Psych ROS  (+) neuromuscular disease:,             GI/Hepatic/Renal: Neg GI/Hepatic/Renal ROS            Endo/Other:    (+) : arthritis: rheumatoid. , .                 Abdominal:   (+) obese,         Vascular: negative vascular ROS. Anesthesia Plan      [place] block     ASA 2       Induction: intravenous. MIPS: Postoperative opioids intended and Prophylactic antiemetics administered. Anesthetic plan and risks discussed with patient. Plan discussed with CRNA. PAT Chart Review:  Chart reviewed per routine on February 11, 2021 at 9:10 AM by Moreno Mclaughlin DO.  (Final assessment and plan per day of surgery team.)    DOS STAFF ADDENDUM:    Pt seen and examined, chart reviewed (including anesthesia, drug and allergy history). Anesthetic plan, risks, benefits, alternatives, and personnel involved discussed with patient. Patient verbalized an understanding and agrees to proceed. Plan discussed with care team members and agreed upon.     Ricci Sims MD  Staff Anesthesiologist  12:18 PM

## 2021-02-12 ENCOUNTER — HOSPITAL ENCOUNTER (OUTPATIENT)
Age: 51
Setting detail: OUTPATIENT SURGERY
Discharge: HOME | End: 2021-02-12
Attending: ORTHOPAEDIC SURGERY | Admitting: ORTHOPAEDIC SURGERY
Payer: COMMERCIAL

## 2021-02-12 ENCOUNTER — ANESTHESIA (OUTPATIENT)
Dept: OPERATING ROOM | Age: 51
End: 2021-02-12
Payer: COMMERCIAL

## 2021-02-12 VITALS
BODY MASS INDEX: 35.52 KG/M2 | RESPIRATION RATE: 12 BRPM | HEIGHT: 66 IN | OXYGEN SATURATION: 100 % | SYSTOLIC BLOOD PRESSURE: 142 MMHG | WEIGHT: 221 LBS | HEART RATE: 68 BPM | DIASTOLIC BLOOD PRESSURE: 80 MMHG | TEMPERATURE: 97.7 F

## 2021-02-12 VITALS
TEMPERATURE: 98.6 F | DIASTOLIC BLOOD PRESSURE: 75 MMHG | RESPIRATION RATE: 12 BRPM | OXYGEN SATURATION: 97 % | SYSTOLIC BLOOD PRESSURE: 107 MMHG

## 2021-02-12 DIAGNOSIS — G56.02 CARPAL TUNNEL SYNDROME OF LEFT WRIST: Primary | ICD-10-CM

## 2021-02-12 LAB
HCG, URINE, POC: NEGATIVE
Lab: NORMAL
NEGATIVE QC PASS/FAIL: NORMAL
POSITIVE QC PASS/FAIL: NORMAL

## 2021-02-12 PROCEDURE — 7100000010 HC PHASE II RECOVERY - FIRST 15 MIN: Performed by: ORTHOPAEDIC SURGERY

## 2021-02-12 PROCEDURE — 7100000011 HC PHASE II RECOVERY - ADDTL 15 MIN: Performed by: ORTHOPAEDIC SURGERY

## 2021-02-12 PROCEDURE — 2580000003 HC RX 258: Performed by: ANESTHESIOLOGY

## 2021-02-12 PROCEDURE — 3600000012 HC SURGERY LEVEL 2 ADDTL 15MIN: Performed by: ORTHOPAEDIC SURGERY

## 2021-02-12 PROCEDURE — 81025 URINE PREGNANCY TEST: CPT | Performed by: ORTHOPAEDIC SURGERY

## 2021-02-12 PROCEDURE — 3700000000 HC ANESTHESIA ATTENDED CARE: Performed by: ORTHOPAEDIC SURGERY

## 2021-02-12 PROCEDURE — 3600000002 HC SURGERY LEVEL 2 BASE: Performed by: ORTHOPAEDIC SURGERY

## 2021-02-12 PROCEDURE — 64721 CARPAL TUNNEL SURGERY: CPT | Performed by: ORTHOPAEDIC SURGERY

## 2021-02-12 PROCEDURE — 6360000002 HC RX W HCPCS: Performed by: NURSE ANESTHETIST, CERTIFIED REGISTERED

## 2021-02-12 PROCEDURE — 2500000003 HC RX 250 WO HCPCS: Performed by: NURSE PRACTITIONER

## 2021-02-12 PROCEDURE — 6370000000 HC RX 637 (ALT 250 FOR IP): Performed by: ANESTHESIOLOGY

## 2021-02-12 PROCEDURE — 2500000003 HC RX 250 WO HCPCS: Performed by: NURSE ANESTHETIST, CERTIFIED REGISTERED

## 2021-02-12 PROCEDURE — 2709999900 HC NON-CHARGEABLE SUPPLY: Performed by: ORTHOPAEDIC SURGERY

## 2021-02-12 PROCEDURE — 3700000001 HC ADD 15 MINUTES (ANESTHESIA): Performed by: ORTHOPAEDIC SURGERY

## 2021-02-12 RX ORDER — FENTANYL CITRATE 50 UG/ML
25 INJECTION, SOLUTION INTRAMUSCULAR; INTRAVENOUS EVERY 5 MIN PRN
Status: DISCONTINUED | OUTPATIENT
Start: 2021-02-12 | End: 2021-02-12 | Stop reason: HOSPADM

## 2021-02-12 RX ORDER — PROMETHAZINE HYDROCHLORIDE 25 MG/ML
6.25 INJECTION, SOLUTION INTRAMUSCULAR; INTRAVENOUS
Status: DISCONTINUED | OUTPATIENT
Start: 2021-02-12 | End: 2021-02-12 | Stop reason: HOSPADM

## 2021-02-12 RX ORDER — FENTANYL CITRATE 50 UG/ML
INJECTION, SOLUTION INTRAMUSCULAR; INTRAVENOUS PRN
Status: DISCONTINUED | OUTPATIENT
Start: 2021-02-12 | End: 2021-02-12 | Stop reason: SDUPTHER

## 2021-02-12 RX ORDER — HYDROCODONE BITARTRATE AND ACETAMINOPHEN 5; 325 MG/1; MG/1
2 TABLET ORAL PRN
Status: COMPLETED | OUTPATIENT
Start: 2021-02-12 | End: 2021-02-12

## 2021-02-12 RX ORDER — MEPERIDINE HYDROCHLORIDE 25 MG/ML
12.5 INJECTION INTRAMUSCULAR; INTRAVENOUS; SUBCUTANEOUS EVERY 5 MIN PRN
Status: DISCONTINUED | OUTPATIENT
Start: 2021-02-12 | End: 2021-02-12 | Stop reason: HOSPADM

## 2021-02-12 RX ORDER — MORPHINE SULFATE 2 MG/ML
2 INJECTION, SOLUTION INTRAMUSCULAR; INTRAVENOUS EVERY 5 MIN PRN
Status: DISCONTINUED | OUTPATIENT
Start: 2021-02-12 | End: 2021-02-12 | Stop reason: HOSPADM

## 2021-02-12 RX ORDER — LIDOCAINE HYDROCHLORIDE 5 MG/ML
INJECTION, SOLUTION INFILTRATION; INTRAVENOUS PRN
Status: DISCONTINUED | OUTPATIENT
Start: 2021-02-12 | End: 2021-02-12 | Stop reason: SDUPTHER

## 2021-02-12 RX ORDER — SODIUM CHLORIDE, SODIUM LACTATE, POTASSIUM CHLORIDE, CALCIUM CHLORIDE 600; 310; 30; 20 MG/100ML; MG/100ML; MG/100ML; MG/100ML
INJECTION, SOLUTION INTRAVENOUS CONTINUOUS
Status: DISCONTINUED | OUTPATIENT
Start: 2021-02-12 | End: 2021-02-12 | Stop reason: HOSPADM

## 2021-02-12 RX ORDER — HYDROCODONE BITARTRATE AND ACETAMINOPHEN 5; 325 MG/1; MG/1
1 TABLET ORAL EVERY 6 HOURS PRN
Qty: 28 TABLET | Refills: 0 | Status: SHIPPED | OUTPATIENT
Start: 2021-02-12 | End: 2021-02-19

## 2021-02-12 RX ORDER — CLINDAMYCIN PHOSPHATE 900 MG/50ML
900 INJECTION INTRAVENOUS ONCE
Status: COMPLETED | OUTPATIENT
Start: 2021-02-12 | End: 2021-02-12

## 2021-02-12 RX ORDER — MIDAZOLAM HYDROCHLORIDE 1 MG/ML
INJECTION INTRAMUSCULAR; INTRAVENOUS PRN
Status: DISCONTINUED | OUTPATIENT
Start: 2021-02-12 | End: 2021-02-12 | Stop reason: SDUPTHER

## 2021-02-12 RX ORDER — HYDROCODONE BITARTRATE AND ACETAMINOPHEN 5; 325 MG/1; MG/1
1 TABLET ORAL PRN
Status: COMPLETED | OUTPATIENT
Start: 2021-02-12 | End: 2021-02-12

## 2021-02-12 RX ORDER — PROPOFOL 10 MG/ML
INJECTION, EMULSION INTRAVENOUS CONTINUOUS PRN
Status: DISCONTINUED | OUTPATIENT
Start: 2021-02-12 | End: 2021-02-12 | Stop reason: SDUPTHER

## 2021-02-12 RX ADMIN — LIDOCAINE HYDROCHLORIDE 50 ML: 5 INJECTION, SOLUTION INFILTRATION; INTRAVENOUS at 12:24

## 2021-02-12 RX ADMIN — CLINDAMYCIN PHOSPHATE 900 MG: 18 INJECTION, SOLUTION INTRAVENOUS at 12:15

## 2021-02-12 RX ADMIN — MIDAZOLAM 2 MG: 1 INJECTION INTRAMUSCULAR; INTRAVENOUS at 12:18

## 2021-02-12 RX ADMIN — HYDROCODONE BITARTRATE AND ACETAMINOPHEN 1 TABLET: 5; 325 TABLET ORAL at 13:03

## 2021-02-12 RX ADMIN — SODIUM CHLORIDE, POTASSIUM CHLORIDE, SODIUM LACTATE AND CALCIUM CHLORIDE: 600; 310; 30; 20 INJECTION, SOLUTION INTRAVENOUS at 12:12

## 2021-02-12 RX ADMIN — PROPOFOL 75 MCG/KG/MIN: 10 INJECTION, EMULSION INTRAVENOUS at 12:27

## 2021-02-12 RX ADMIN — FENTANYL CITRATE 100 MCG: 50 INJECTION, SOLUTION INTRAMUSCULAR; INTRAVENOUS at 12:27

## 2021-02-12 ASSESSMENT — PAIN DESCRIPTION - PAIN TYPE
TYPE: SURGICAL PAIN;CHRONIC PAIN
TYPE: SURGICAL PAIN

## 2021-02-12 ASSESSMENT — PAIN DESCRIPTION - FREQUENCY
FREQUENCY: CONTINUOUS
FREQUENCY: CONTINUOUS

## 2021-02-12 ASSESSMENT — PAIN DESCRIPTION - DESCRIPTORS
DESCRIPTORS: ACHING;BURNING;CONSTANT
DESCRIPTORS: ACHING;BURNING;CONSTANT

## 2021-02-12 ASSESSMENT — PAIN SCALES - GENERAL: PAINLEVEL_OUTOF10: 7

## 2021-02-12 ASSESSMENT — PAIN DESCRIPTION - ORIENTATION: ORIENTATION: LEFT

## 2021-02-12 ASSESSMENT — PAIN DESCRIPTION - LOCATION
LOCATION: WRIST
LOCATION: WRIST

## 2021-02-12 NOTE — OP NOTE
Operative Note      Patient: Brent Diamond  YOB: 1970  MRN: [id number]    Date of Procedure: 2/12/2021    Pre-Op Diagnosis: LEFT CARPAL TUNNEL    Post-Op Diagnosis: Same       Procedure(s):  LEFT CARPAL TUNNEL RELEASE    Surgeon(s): Thierry Willams DO    Assistant:   * No surgical staff found *    Anesthesia: [place] Block    Estimated Blood Loss (mL): Minimal    Complications: None    Specimens:   * No specimens in log *    Implants:  * No implants in log *      Drains: * No LDAs found *    Findings: as above    Detailed Description of Procedure:   below    SURGEON: PAULA SHAFFER D.O.   ASSISTANT: none  PREOPERATIVE DIAGNOSIS: Left wrist carpal tunnel syndrome. POSTOPERATIVE DIAGNOSIS: Left wrist carpal tunnel syndrome. PROCEDURE: Release transverse carpal ligament, Left wrist.   ANESTHESIA: bb  ESTIMATED BLOOD LOSS: minimal in degree  COMPLICATIONS: None. Brief Hospital Course: The  patients well  known to Amor Garcia DO's practice with persistent complaints of left wrist/hand pain and numbness. Wrsit and hand pain has failed to be relieved by non-operative conservative measures, and has began affecting daily activities of living. After examination of the patient, review of the EMG, radiologic studies, and appropriate pre-operative risk assessment, Amor Garcia DO recommended left carpal tunnel release,  which the patient was agreeable towards. OPERATIVE PROCEDURE: The patient was brought to the operating suite and was   given anesthesia. The left arm was identified with a   preoperative time-out, the arm was prepped and draped in sterile fashion, I  outlined incision along the volar side of the wrist just ulnar to the thenar   wrist crease. I made an approximately 2 to 4-cm incision over this area through the skin   and subcutaneous tissue. I dissected that down to the level of the palmar   fascia. It was identified and I released it sharply.  I Identified the   transverse

## 2021-02-12 NOTE — H&P
Updated H&P    Chief Complaint   Patient presents with    Wrist Pain       Left carpal tunnel. C/o numbness in all fingers except the small finger. SHe has tried bracing without relief. Non dominant hand.         Khalif Aguilera is a 54y.o. year old  female who presents for evaluation of bilateral wrist pain L>R.  she reports this started 20 yrs ago, but much worse over the past 2 months. she does remember a specific injury that started the pain. The injury was none. The pain is located mainly activity. The pain is worse with activityand better with rest.  The patient has tried splint, nsaids. The treatment has not been effective. The patient is right dominant. 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     Past Medical History        Past Medical History:   Diagnosis Date    Arthritis           Past Surgical History   No past surgical history on file. Current Medication      Current Outpatient Medications:     celecoxib (CELEBREX) 200 MG capsule, Take 200 mg by mouth 2 times daily, Disp: , Rfl:     methotrexate (RHEUMATREX) 2.5 MG chemo tablet, Take 2.5 mg by mouth once a week., Disp: , Rfl:     folic acid (FOLVITE) 1 MG tablet, Take 1 mg by mouth daily. , Disp: , Rfl:           Allergies   Allergen Reactions    Amoxicillin        Social History               Socioeconomic History    Marital status: Single       Spouse name: Not on file    Number of children: Not on file    Years of education: 25    Highest education level: Not on file   Occupational History    Occupation:        Comment: [employer] [address] [employer] Financial resource strain: Not on file    Food insecurity       Worry: Not on file       Inability: Not on file    Transportation needs       Medical: Not on file       Non-medical: Not on file   Tobacco Use    Smoking status: Never Smoker    Smokeless tobacco: Never Used   Substance and Sexual Activity    Alcohol use:  Yes       Alcohol/week: 4.0 standard drinks       Types: 4 Cans of beer per week       Comment: social    Drug use: No    Sexual activity: Yes       Partners: Male   Lifestyle    Physical activity       Days per week: Not on file       Minutes per session: Not on file    Stress: Not on file   Relationships    Social connections       Talks on phone: Not on file       Gets together: Not on file       Attends Religion service: Not on file       Active member of club or organization: Not on file       Attends meetings of clubs or organizations: Not on file       Relationship status: Not on file    Intimate partner violence       Fear of current or ex partner: Not on file       Emotionally abused: Not on file       Physically abused: Not on file       Forced sexual activity: Not on file   Other Topics Concern    Not on file   Social History Narrative    Not on file         Family History         Family History   Problem Relation Age of Onset    Other Mother      Arthritis Father      Diabetes Father      Other Father              REVIEW OF SYSTEMS:      General/Constitution:  (-)weight loss, (-)fever, (-)chills, (-)weakness. Skin: (-) rash,(-) psoriasis,(-) eczema, (-)skin cancer. Musculoskeletal: (-) fractures,  (-) dislocations,(-) collagen vascular disease, (-) fibromyalgia, (-) multiple sclerosis, (-) muscular dystrophy, (-) RSD,(-) joint pain (-)swelling, (-) joint pain,swelling. Neurologic: (-) epilepsy, (-)seizures,(-) brain tumor,(-) TIA, (-)stroke, (-)headaches, (-)Parkinson disease,(-) memory loss, (-) LOC. Cardiovascular: (-) Chest pain, (-) swelling in legs/feet, (-) SOB, (-) cramping in legs/feet with walking. Respiratory: (-) SOB, (-) Coughing, (-) night sweats. GI: (-) nausea, (-) vomiting, (-) diarrhea, (-) blood in stool, (-) gastric ulcer.   Psychiatric: (-) Depression, (-) Anxiety, (-) bipolar disease, (-) Alzheimer's Disease  Allergic/Immunologic: (-) allergies latex, (-) allergies metal, (-) skin sensitivity. Hematlogic: (-) anemia, (-) blood transfusion, (-) DVT/PE, (-) Clotting disorders        Subjective:  /87   Pulse 76   Temp 97.7 °F (36.5 °C) (Temporal)   Resp 16   Ht 5' 6\" (1.676 m)   Wt 221 lb (100.2 kg)   LMP 12/21/2020   SpO2 97%   BMI 35.67 kg/m²       Vital signs are stable.  In general, patient is awake, alert and oriented X3, in no apparent distress.  Examination of HENT reveals normocephalic, atraumatic.  PERRLA/EOMI sclera are white.  Conjunctivae are clear.  TM's are intact.  Pharynx is pink and moist.  Uvula and tongue are midline.  Heart: Positive S1 and positive S2 with regular rate and rhythm.  Lungs: Clear to auscultation bilaterally without rales, rhonchi or wheezes.  Abdomen: soft, nontender.  Positive bowel sounds.  No organomegaly.  No guarding or rigidity.          Psycihatric:  The patient is alert and oriented x 3, appears to be stated age and in no distress.       Respiratory:  Respiratory effort is not labored. Patient is not gasping. Palpation of the chest reveals no tactile fremitus.     Skin:  Upon inspection: the skin appears warm, dry and intact. There is not a previous scar over the affected area. There is not any cellulitis, lymphedema or cutaneous lesions noted in the lower extremities. Upon palpation there is no induration noted.       Neurologic:  Motor exam of the upper extremities show: The reflexes in biceps/triceps/brachioradialis are equal and symmetric. Sensory exam C5-T1 are normal bilaterally. Cardiovascular: The vascular exam is normal and is well perfused to distal extremities. There are 2+ radial pulses bilaterally, and motor and sensation is intact to median, ulnar, and radial, musclocutaneus, and axillary nerve distribution and grossly symmetric bilaterally. There is cap refill noted less than two seconds in all digits. There is not edema of the bilateral upper extremities.   There is not varicosities noted in the distal extremities.       Lymph:  Upon palpation,  there is no lymphadenopathy noted in bilateral upper extremities.       Musculoskeletal:  Gait: normal; examination of the nails and digits reveal no cyanosis or clubbing.     Cervical Exam:  On physical exam, Adolfo Abernathy is well-developed, well-nourished, oriented to person, place and time. her gait is normal.  On evaluation of her cervical spine, she has full range of motion of the cervical spine without pain. There is no cervical tenderness to palpation.      Shoulder Exam:  On evaluation of her bilaterally upper extremities, her bilateral shoulder has no deformity. There is not evidence of scapular dyskinesis. There is not muscle atrophy in shoulder girdle. The range of motion for the Right Shoulder is 150/50/t8 and for the Left shoulder is 150/50/t8. Right shoulder Motor strength is 5/5 in the supraspinatus, 5/5 internal rotation and 5/5 in external rotation, and Left shoulder motor strength 5/5 in supraspinatus, 5/5 in internal rotation, 5/5 in external rotation. Elbow exam:  Evaluation of the elbow, reveals no signs of swelling or deformity. ROM is 0-140. There is not instability with varus/valgus stresses. Motor strength is 5/5 with flexion/extension.      Wrist exam:  Inspection of the bilateral upper extremities, there is no evidence of deformity of the wrist.  ROM Wrist ROM R wrist DF 70, VF 80, L wrist DF 70, VF 80, R pronation 90/ supination 90, L pronation 90/supination 90. Motor strength is 5/5 with Dorsiflexion/Volarflexion/Supination/Pronation. Motor and sensation is intact and symmetric throughout the bilateral upper extremities in the median, ulnar and radial , musclcutaneous, and axillary nerve distributions.     Hand exam:  The skin overlying the hand is  intact. There is not evidence of scar, lesion, laceration, or abrasion. The motion in the small joints of the hand are intact with no stiffness or deformity.   The ROM in the MCP flexion 90/ extension 0 , PIP flexion 90/ extension 0, DIP flexion 70/ extension 0. There is not rotational deformity. There is no masses or adenopathy in bilateral upper extremities. Radial pulses are 2+ and symmetric bilaterally. Capillary refill is intact and < 2 seconds. Motor strength is 5/5 with flexion and extension of the small finger joints.      Right:  Phallens sign(+), Tinnells sign (+), Median nerve compression test (+),  Finklesteins (-), [employer] Grind test (-), [employer](-). Left:     Phallens sign(+), Tinnells sign (+), Median nerve compression test (+),  Finklesteins (-), [employer] Grind test (-), [employer](-).    Xrays:   FINDINGS:   No acute fracture or dislocation. Alignment and joint spaces are maintained. Soft tissues are unremarkable. The bone mineralization is normal. No abnormal   calcifications are present.      EMG: moderate carpal tuinnel     Radiographic findings reviewed with patient     Impression:        Encounter Diagnoses   Name Primary?  Right carpal tunnel syndrome Yes    Left carpal tunnel syndrome           Plan: Natural history and expected course discussed. Questions answered. Educational materials distributed. Rest, ice, compression, and elevation (RICE) therapy. Reduction in offending activity discussed. I had a lengthy discussion with the patient regarding their diagnosis. I explained treatment options including surgical vs non surgical treatment. I reviewed in detail the risks and benefits and outlined the procedure in detail with expected outcomes and possible complications. I also discussed non surgical treatment such as injections (CSI), physical therapy, topical creams and NSAID's. They have elected for surgical management at this time. We will perform a Left carpal tunnel release 2/12/2021.    The risks and benefits were reviewed with the patient such as:  DVT, infection,  injuries to blood vessels and nerves, non relief of symptoms, continued pain, worsening of symptoms. The patient was counseled at length about the risks of tad Covid-19 during their perioperative period and any recovery window from their procedure.  The patient was made aware that tad Covid-19  may worsen their prognosis for recovering from their procedure  and lend to a higher morbidity and/or mortality risk.  All material risks, benefits, and reasonable alternatives including postponing the procedure were discussed.  The patient does wish to proceed with the procedure at this time.

## 2021-02-12 NOTE — ANESTHESIA POSTPROCEDURE EVALUATION
Department of Anesthesiology  Postprocedure Note    Patient: Berto Garces  MRN: [id number]  YOB: 1970  Date of evaluation: 2/12/2021  Time:  1:50 PM     Procedure Summary     Date: 02/12/21 Room / Location: [address] [place] [address] 01 / [facility]    Anesthesia Start: 1218 Anesthesia Stop: 1253    Procedure: LEFT CARPAL TUNNEL RELEASE (Left ) Diagnosis: (LEFT CARPAL TUNNEL)    Surgeons: Arsenio Mario DO Responsible Provider: Fatou Paredes MD    Anesthesia Type: MAC, [place] block ASA Status: 2          Anesthesia Type: MAC, [place] block    Grazyna Phase I: Grazyna Score: 10    Grazyna Phase II: Grazyna Score: 10    Last vitals: Reviewed and per EMR flowsheets.        Anesthesia Post Evaluation    Patient location during evaluation: PACU  Patient participation: complete - patient participated  Level of consciousness: awake  Airway patency: patent  Nausea & Vomiting: no nausea and no vomiting  Complications: no  Cardiovascular status: hemodynamically stable  Respiratory status: acceptable  Hydration status: euvolemic

## 2021-02-22 ENCOUNTER — OFFICE VISIT (OUTPATIENT)
Dept: ORTHOPEDIC SURGERY | Age: 51
End: 2021-02-22

## 2021-02-22 VITALS — HEIGHT: 66 IN | TEMPERATURE: 98 F | WEIGHT: 221 LBS | BODY MASS INDEX: 35.52 KG/M2

## 2021-02-22 DIAGNOSIS — G56.02 LEFT CARPAL TUNNEL SYNDROME: Primary | ICD-10-CM

## 2021-02-22 PROCEDURE — 99024 POSTOP FOLLOW-UP VISIT: CPT | Performed by: NURSE PRACTITIONER

## 2021-02-22 NOTE — PROGRESS NOTES
Subjective:     Salome Loza is here for followup after left carpal tunnel surgery. The patient is not having any pain. The patient notes improvement in the following symptoms: strength, numbness, pain, sensation. The patient denies fever, wound drainage, increasing redness, pus, increasing pain, increasing swelling. Post op problems reported: none. Objective:       General :    alert, appears stated age and cooperative   Sutures:   Sutures in place and will be removed today. Incision:  healing well, no significant drainage, no dehiscence, no significant erythema   Tenderness:  none   Flexion ROM:  full range of motion   Extension ROM:  full range of motion   Effusion:  none        Assessment:     Encounter Diagnosis   Name Primary?  Left carpal tunnel syndrome Yes     Plan:   Sutures removed today. Range of motion and rehabilitation exercises discussed with the patient. HEP  Follow up in 4 weeks.   Call with any questions or concerns at [phone]

## 2021-03-22 ENCOUNTER — OFFICE VISIT (OUTPATIENT)
Dept: ORTHOPEDIC SURGERY | Age: 51
End: 2021-03-22

## 2021-03-22 VITALS — BODY MASS INDEX: 35.52 KG/M2 | HEIGHT: 66 IN | WEIGHT: 221 LBS

## 2021-03-22 DIAGNOSIS — G56.02 LEFT CARPAL TUNNEL SYNDROME: Primary | ICD-10-CM

## 2021-03-22 PROCEDURE — 99024 POSTOP FOLLOW-UP VISIT: CPT | Performed by: NURSE PRACTITIONER

## 2021-03-22 NOTE — PROGRESS NOTES
Subjective:     Ariel Hopkins is here for followup after left carpal tunnel surgery. The patient is not having any pain. The patient notes improvement in the following symptoms: strength, numbness, pain, sensation. The patient denies fever, wound drainage, increasing redness, pus, increasing pain, increasing swelling. Post op problems reported: stiffness and some incisional pain     Objective:       General :    alert, appears stated age and cooperative   Sutures:   out   Incision:  healing well, no significant drainage, no dehiscence, no significant erythema   Tenderness:  mild    Flexion ROM:  limited range of motion   Extension ROM:  limited range of motion   Effusion:  none        Assessment:     Encounter Diagnosis   Name Primary?  Left carpal tunnel syndrome Yes     Plan:     Range of motion and rehabilitation exercises discussed with the patient.   HEP  OT  FU in 2 months  Call with any questions or concerns at [phone]

## 2021-04-12 ENCOUNTER — EVALUATION (OUTPATIENT)
Dept: OCCUPATIONAL THERAPY | Age: 51
End: 2021-04-12
Payer: COMMERCIAL

## 2021-04-12 DIAGNOSIS — G56.02 LEFT CARPAL TUNNEL SYNDROME: Primary | ICD-10-CM

## 2021-04-12 PROCEDURE — 97530 THERAPEUTIC ACTIVITIES: CPT | Performed by: OCCUPATIONAL THERAPIST

## 2021-04-12 PROCEDURE — 97165 OT EVAL LOW COMPLEX 30 MIN: CPT | Performed by: OCCUPATIONAL THERAPIST

## 2021-04-12 PROCEDURE — 97035 APP MDLTY 1+ULTRASOUND EA 15: CPT | Performed by: OCCUPATIONAL THERAPIST

## 2021-04-12 NOTE — PROGRESS NOTES
[facility]      [address] THERAPY INITIAL EVALUATION    Phone: [phone]  Fax: [phone]     Date:  2021  Initial Evaluation Date: 21    Patient Name:  Joe Figueredo    :  1970    Restrictions/Precautions:  ROM as tolerated, Low fall risk  Diagnosis:  G56.02 (ICD-10-CM) - Left carpal tunnel syndrome      Insurance/Certification information:  [facility]  Referring Physician:  Dr Terrie Leon  Date of Surgery/Injury: L CTR 21  Plan of care signed (Y/N):  N  Visit# / total visits:     Past Medical History:   Past Medical History:   Diagnosis Date    Arthritis     rheumatoid      Past Surgical History:   Past Surgical History:   Procedure Laterality Date    CARPAL TUNNEL RELEASE Left 2021    Left Carpal Tunnel Release    CARPAL TUNNEL RELEASE Left 2021    LEFT CARPAL TUNNEL RELEASE performed by Linsey Chavez DO at [facility] [place], [facility]         Reason for Referral: Pt presents with a Hx of bilateral wrist pain L>R. She reported this started 20 yrs ago, but much worse over the past 2 months. The patient has tried splint, nsaids. The treatment has not been effective. Pt required surgery as follows:    Date of Procedure: 2021     Pre-Op Diagnosis: LEFT CARPAL TUNNEL       Procedure(s):  LEFT CARPAL TUNNEL RELEASE    Main pt complaints today include intermittent pain, swelling/ tightness at the CT incision and guarding of the wrist/ hand during heavy daily activity. 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     Pain Level: -0-5 on scale of 1-10, burning, shooting, tight (pulling), uncomfortable and variable intensity pains in the wrist    Cognition:   Alert/Oriented x3     ADL  Feeding: I  Grooming:  I  Bathing:  I  UE Dressing: I  LE Dressing:  I  Toileting:  I  Transfer:  I  Comments: Pt is doing well with her self care. However, difficulties are present with opening containers, with completing tasks that involve WB onto the palm and with moderate to heavy homemaking. Pt states she protects the left hand due to decreased tolerance for touching the scar. (guarding 50%). The scar is starting to keloid, is sensitive and is very tight in the surrounding tissues. UE Assessment: Ambidexterous    Left UE AROM: Exceptions to WNL  Wrist flexion 0-60  Wrist ext 0-45\Thumb radial extension 0-50  Comments: Tendon glides are tight    Sensation: WFL- paresthesias in the left wrist/ hand have resolved since surgery. Dynamometer (setting 2):     Left: 50#/ 55#/ 50#      Right: 45#    Pinch Meter:   Lateral: Left= 12#, Right= 14.5#    Palmar 3 point: Left= 12.5#, Right= 13.5#    Coordination: WNL with no focal deficits    Intervention: Tx started with a focus on scar mgmt. Scar massage provided with fair tolerance. The CT scar is very tight and tender. Pt is encouraged to continue scar massage via manual and with rolling on a home exercise bar 4x/ day as tolerated. Silicone sheet also provided for night time use with stockinette to hold the gel in place. Pt is to continue silicone use nightly to help minimize keloid scarring. Lastly low level US completed x 7 minutes to the CT area to help with tissue tightness and encourage scar remodeling. Pt feels her fingers and wrist are much looser by Tx end. Will continue with POT POC.  (Tx 20min)    Assessment of current deficits   Functional mobility []  ADLs [] Strength [x]  Cognition []  Functional transfers  [] IADLs [x] Safety Awareness []  Endurance []  Fine Motor Coordination [] Balance [] Vision/perception [] Sensation []   Gross Motor Coordination [] ROM [x]  Work []  Leisure[x]     Eval Complexity: Low  Profile and History- interview, MD notes, op notes, Urgent care notes  Assessment of Occupational Performance and Identification of Deficits- 4 performance deficits  Clinical Decision Making- no modifications in testing/ co-morbidity RA    Rehab Potential:   [x] Good  [] Fair  [] Poor   Suggested Professional Referral: [x] No  [] Yes:  Barriers to Goal Achievement[de-identified]   [x] No  [] Yes:  Domestic Concerns:     [x] No  [] Yes:    Goal Formulation: Patient \" I need to loosen up that scar. It really hurts\". Time In: 1530  Time Out: 1630  Timed Code Treatment Minutes: 60 min      PLAN     Plan   Plan: Plan of care initiated. Frequency Pt will be seen 1-2x a week for 4 weeks or up to 8 sessions as needed. Treatment to include:   [x] Instruction in HEP   Modalities:  [x] Therapeutic Exercise [x] Ultrasound   [] Electrical Stimulation/Attended  [x] PROM/Stretching             [] Fluidotherapy          [x]  Paraffin                   [x]AAROM  [x] AROM             [] Iontophoresis: 4 mg/mL; Dexamethasone Sodium           [] Desensitization                           Phosphate 40-80 mAmin     [] Neuromuscular Re-education    [] Splinting    [x] Therapeutic Activity            [x] Pain Management with/without modalities PRN        [x] Manual Therapy/Fascial release   [] ADL/IADL re-training        [x] Tendon Glides                   []Joint Protection/Training  []Ergonomics       [] Joint Mobilization  []Adaptive Equipment Assessment/Training          [] Manual Edema Mobilization    [] Energy Conservation/Work Simplification  [] GM/FM Coordination  []  Safety retraining/education per  individual diagnosis/goals     GOALS (Long term same as Short term):  1) Patient will demonstrate good understanding of home program(exercises/activities/diagnosis/prognosis/goals) with good accuracy. 2) Patient will demonstrate increased active/passive range of motion of their Left wrist/ hand [facility] for ADL/IADL completion.   3) Patient will demonstrate increased /pinch

## 2021-04-19 ENCOUNTER — TREATMENT (OUTPATIENT)
Dept: OCCUPATIONAL THERAPY | Age: 51
End: 2021-04-19
Payer: COMMERCIAL

## 2021-04-19 DIAGNOSIS — G56.02 LEFT CARPAL TUNNEL SYNDROME: Primary | ICD-10-CM

## 2021-04-19 PROCEDURE — 97530 THERAPEUTIC ACTIVITIES: CPT | Performed by: OCCUPATIONAL THERAPIST

## 2021-04-19 PROCEDURE — 97018 PARAFFIN BATH THERAPY: CPT | Performed by: OCCUPATIONAL THERAPIST

## 2021-04-19 PROCEDURE — 97110 THERAPEUTIC EXERCISES: CPT | Performed by: OCCUPATIONAL THERAPIST

## 2021-04-19 PROCEDURE — 97140 MANUAL THERAPY 1/> REGIONS: CPT | Performed by: OCCUPATIONAL THERAPIST

## 2021-04-19 NOTE — PROGRESS NOTES
[facility]    OCCUPATIONAL THERAPY PROGRESS NOTE    Date:  2021  Initial Evaluation Date: 21    Patient Name:  Natalie Friend    :  1970  Restrictions/Precautions:  ROM as tolerated, Low fall risk  Diagnosis:  G56.02 (ICD-10-CM) - Left carpal tunnel syndrome                                           Insurance/Certification information:  Celia Gallagher  Referring Physician:  Dr Monserrat Bergeron  Date of Surgery/Injury: L CTR 21  Plan of care signed (Y/N):  N  Visit# / total visits:     Pain Level: no pain at Tx start/ mildy uncomfortable with Tx     Subjective: \" I think it is getting better. \"   Objective:  Updated POC to be completed by visit 8. INTERVENTION: COMPLETED: SPECIFICS/COMMENTS:   Modality:     Paraffin tx x 10 min as preconditioning prior to ex   US x L hand / wrist x 5 min post activity to minimize swelling/ loosen scarring and decrease post tx discomfort   AROM/ AAROM     Tendon glides x    Median nerve glides x    wristciser 5x Challenging/ stiff in the wrist        PROM/Stretching:     Prayer stretch 5x         Manual techniques:     Scar massage x Dense near the scar/ improving        Strengthening:               Other:                 Assessment/Comments: Pt is making Good progress toward stated plan of care. Tx completed with good tolerance. Will monitor response to Tx and adjust as needed. -Rehab Potential: Good  -Requires OT Follow Up: Yes  Time In: 1530            Time Out: 1625             Visit #: 2    Treatment Charges: Mins Units   Modalities:paraffin/ US 10/5 1   Ther Exercise 15 1   Manual Therapy 10 1   Thera Activities 15 1   ADL/Home Mgt      Neuro Re-education     Group Therapy     Non-Billable Service Time     Other     Total Time/Units 55 4       -Response to Treatment: Pt states her wrist feels better and looser by tx end.    Goals: Goals for pt can be seen on initial eval occurring on 21    Plan: [x]  Continue Plan of care: Pt education continues at each visit to obtain maximum benefits from skilled OT intervention.   []  [address] of care:   []  Discharge:      Sean Duval OT/L  [id number]

## 2021-04-21 ENCOUNTER — TREATMENT (OUTPATIENT)
Dept: OCCUPATIONAL THERAPY | Age: 51
End: 2021-04-21
Payer: COMMERCIAL

## 2021-04-21 DIAGNOSIS — G56.02 LEFT CARPAL TUNNEL SYNDROME: Primary | ICD-10-CM

## 2021-04-21 PROCEDURE — 97530 THERAPEUTIC ACTIVITIES: CPT | Performed by: OCCUPATIONAL THERAPIST

## 2021-04-21 PROCEDURE — 97140 MANUAL THERAPY 1/> REGIONS: CPT | Performed by: OCCUPATIONAL THERAPIST

## 2021-04-21 PROCEDURE — 97110 THERAPEUTIC EXERCISES: CPT | Performed by: OCCUPATIONAL THERAPIST

## 2021-04-21 PROCEDURE — 97018 PARAFFIN BATH THERAPY: CPT | Performed by: OCCUPATIONAL THERAPIST

## 2021-04-21 NOTE — PROGRESS NOTES
[facility] [place]    OCCUPATIONAL THERAPY PROGRESS NOTE    Date:  2021  Initial Evaluation Date: 21    Patient Name:  Jaunita Weaver    :  1970  Restrictions/Precautions:  ROM as tolerated, Low fall risk  Diagnosis:  G56.02 (ICD-10-CM) - Left carpal tunnel syndrome                                           Insurance/Certification information:  [address]  Referring Physician:  Dr Cori James  Date of Surgery/Injury: L CTR 21  Plan of care signed (Y/N):  N  Visit# / total visits: 3 / 8    Pain Level: no pain at Tx start/ mildy uncomfortable with Tx     Subjective: Pt states \"my sensation is just weird\"   Objective:  Updated POC to be completed by visit 8. INTERVENTION: COMPLETED: SPECIFICS/COMMENTS:   Modality:     Paraffin tx x 10 min as preconditioning prior to ex   US x L hand / wrist x 5 min post activity to minimize swelling/ loosen scarring and decrease post tx discomfort   AROM/ AAROM     Tendon glides x    Median nerve glides x    wristciser 8x Challenging/ stiff in the wrist        PROM/Stretching:     Prayer stretch 5x    wrist x All planes of motion   Manual techniques:     Scar massage x Dense near the scar/ improving   therabar  x Rolling over scar with moderate pressure   Strengthening:     Yellow putty x weight bearing, rolling, pinches and gross gripping        Other:                 Assessment/Comments: Pt is making Good progress toward stated plan of care. Pt tolerated session well with increased endurance with strengthening and massage to the scar with desensitization techniques. Will monitor response to Tx and adjust as needed.      -Rehab Potential: Good  -Requires OT Follow Up: Yes  Time In: 4:00p           Time Out: 5:00p            Visit #: 3    Treatment Charges: Mins Units   Modalities:paraffin/ US 10/5 1   Ther Exercise 15 1   Manual Therapy 10 1   Thera Activities 15 1   ADL/Home Mgt      Neuro Re-education     Group Therapy     Non-Billable Service Time     Other     Total Time/Units 55 4       -Response to Treatment: Pt states her wrist feels better and looser by tx end. Goals: Goals for pt can be seen on initial eval occurring on 4-12-21    Plan:   [x]  Continue Plan of care: Pt education continues at each visit to obtain maximum benefits from skilled OT intervention. []  Alter Plan of care:   []  Discharge:       [address] [facility], OTR/L #[id number]

## 2021-04-26 ENCOUNTER — TREATMENT (OUTPATIENT)
Dept: OCCUPATIONAL THERAPY | Age: 51
End: 2021-04-26
Payer: COMMERCIAL

## 2021-04-26 DIAGNOSIS — G56.02 LEFT CARPAL TUNNEL SYNDROME: Primary | ICD-10-CM

## 2021-04-26 PROCEDURE — 97140 MANUAL THERAPY 1/> REGIONS: CPT | Performed by: OCCUPATIONAL THERAPIST

## 2021-04-26 PROCEDURE — 97530 THERAPEUTIC ACTIVITIES: CPT | Performed by: OCCUPATIONAL THERAPIST

## 2021-04-26 PROCEDURE — 97110 THERAPEUTIC EXERCISES: CPT | Performed by: OCCUPATIONAL THERAPIST

## 2021-04-26 PROCEDURE — 97018 PARAFFIN BATH THERAPY: CPT | Performed by: OCCUPATIONAL THERAPIST

## 2021-04-26 NOTE — PROGRESS NOTES
[facility]    OCCUPATIONAL THERAPY PROGRESS NOTE    Date:  2021  Initial Evaluation Date: 21    Patient Name:  Poly Wheeler    :  1970  Restrictions/Precautions:  ROM as tolerated, Low fall risk  Diagnosis:  G56.02 (ICD-10-CM) - Left carpal tunnel syndrome                                           Insurance/Certification information:  [address]  Referring Physician:  Dr Clemencia Soliz  Date of Surgery/Injury: L CTR 21  Plan of care signed (Y/N):  N  Visit# / total visits:     Pain Level: no pain at Tx start/ mildy uncomfortable with Tx     Subjective: Pt states \"I doing have any pain and the sensation is better so that is good\"   Objective:  Updated POC to be completed by visit 8. INTERVENTION: COMPLETED: SPECIFICS/COMMENTS:   Modality:     Paraffin tx x 10 min as preconditioning prior to ex   US x L hand / wrist x 5 min post activity to minimize swelling/ loosen scarring and decrease post tx discomfort   AROM/ AAROM     Tendon glides x    Median nerve glides x    wristciser 8x Challenging/ stiff in the wrist        PROM/Stretching:     Prayer stretch 5x    wrist x All planes of motion   Manual techniques:     Scar massage x Dense near the scar/ improving-with mini vibration tool added   therabar  x Rolling over scar with moderate pressure   Strengthening:     Yellow putty x weight bearing, rolling, pinches and gross gripping  Putty tool for bottle cap   Forearm strengthening  x -2# dumbbell, wrist flexion and extension    Other:                 Assessment/Comments: Pt is making Good progress toward stated plan of care. Pt tolerated session well with increased endurance with strengthening and massage to the scar with desensitization techniques. Mini vibration tool added during scar massage this date-pt tolerated well with good results of decreasing scar density. Will monitor response to Tx and adjust as needed.      -Rehab Potential: Good  -Requires OT Follow Up: Yes  Time In: 4:00p           Time Out: 5:00p            Visit #: 4    Treatment Charges: Mins Units   Modalities:paraffin/ US 10/5 1   Ther Exercise 15 1   Manual Therapy 10 1   Thera Activities 15 1   ADL/Home Mgt      Neuro Re-education     Group Therapy     Non-Billable Service Time     Other     Total Time/Units 55 4       -Response to Treatment: Pt states her wrist feels better and looser by tx end. Goals: Goals for pt can be seen on initial eval occurring on 4-12-21    Plan:   [x]  Continue Plan of care: Pt education continues at each visit to obtain maximum benefits from skilled OT intervention. []  Alter Plan of care:   []  Discharge:       [address] [facility], OTR/L #[id number]

## 2021-04-28 ENCOUNTER — TREATMENT (OUTPATIENT)
Dept: OCCUPATIONAL THERAPY | Age: 51
End: 2021-04-28
Payer: COMMERCIAL

## 2021-04-28 DIAGNOSIS — G56.02 LEFT CARPAL TUNNEL SYNDROME: Primary | ICD-10-CM

## 2021-04-28 PROCEDURE — 97018 PARAFFIN BATH THERAPY: CPT | Performed by: OCCUPATIONAL THERAPIST

## 2021-04-28 PROCEDURE — 97140 MANUAL THERAPY 1/> REGIONS: CPT | Performed by: OCCUPATIONAL THERAPIST

## 2021-04-28 PROCEDURE — 97110 THERAPEUTIC EXERCISES: CPT | Performed by: OCCUPATIONAL THERAPIST

## 2021-04-28 PROCEDURE — 97530 THERAPEUTIC ACTIVITIES: CPT | Performed by: OCCUPATIONAL THERAPIST

## 2021-04-28 NOTE — PROGRESS NOTES
[facility]    OCCUPATIONAL THERAPY PROGRESS NOTE    Date:  2021  Initial Evaluation Date: 21    Patient Name:  Joe Figueredo    :  1970  Restrictions/Precautions:  ROM as tolerated, Low fall risk  Diagnosis:  G56.02 (ICD-10-CM) - Left carpal tunnel syndrome                                           Insurance/Certification information:  [address]  Referring Physician:  Dr Terrie Leon  Date of Surgery/Injury: L CTR 21  Plan of care signed (Y/N):  N  Visit# / total visits:     Pain Level: no pain at Tx start/ mildy uncomfortable with Tx     Subjective: Pt states \"I know it is getting better-but this sensation tingling is still killing me \"   Objective:  Updated POC to be completed by visit 8. INTERVENTION: COMPLETED: SPECIFICS/COMMENTS:   Modality:     Paraffin tx x 10 min as preconditioning prior to ex   US x L hand / wrist x 5 min post activity to minimize swelling/ loosen scarring and decrease post tx discomfort   AROM/ AAROM     Tendon glides x    Median nerve glides x    wristciser 8x Challenging/ stiff in the wrist        PROM/Stretching:     Prayer stretch 5x    wrist x All planes of motion   Manual techniques:     Scar massage x Dense near the scar/ improving-with mini vibration tool added   therabar  x Rolling over scar with moderate pressure   Strengthening:     Digital strengthening x 3 pt pinch with blue resistive clips 30 pom poms   Yellow putty x weight bearing, rolling, pinches and gross gripping  Putty tool for bottle cap  -10 coins manipulating out of putty   Forearm strengthening  x -2# dumbbell, wrist flexion and extension   -red therabar   Other:                 Assessment/Comments: Pt is making Good progress toward stated plan of care. Pt tolerated session well with increased endurance noted with strengthening activities.  Pt scar is becoming less and less dense over the last few sessions with improvements in sensation. Will monitor response to Tx and adjust as needed. -Rehab Potential: Good  -Requires OT Follow Up: Yes  Time In: 4:00p           Time Out: 5:00p            Visit #: 5    Treatment Charges: Mins Units   Modalities:paraffin/ US 10/5 1   Ther Exercise 15 1   Manual Therapy 10 1   Thera Activities 15 1   ADL/Home Mgt      Neuro Re-education     Group Therapy     Non-Billable Service Time     Other     Total Time/Units 55 4       -Response to Treatment: Pt states her wrist feels better and looser by tx end. Goals: Goals for pt can be seen on initial eval occurring on 4-12-21    Plan:   [x]  Continue Plan of care: Pt education continues at each visit to obtain maximum benefits from skilled OT intervention. []  Alter Plan of care:   []  Discharge:       [address] [facility], OTR/L #[id number]

## 2021-05-05 ENCOUNTER — TREATMENT (OUTPATIENT)
Dept: OCCUPATIONAL THERAPY | Age: 51
End: 2021-05-05
Payer: COMMERCIAL

## 2021-05-05 DIAGNOSIS — G56.02 LEFT CARPAL TUNNEL SYNDROME: Primary | ICD-10-CM

## 2021-05-05 PROCEDURE — 97140 MANUAL THERAPY 1/> REGIONS: CPT | Performed by: OCCUPATIONAL THERAPIST

## 2021-05-05 PROCEDURE — 97530 THERAPEUTIC ACTIVITIES: CPT | Performed by: OCCUPATIONAL THERAPIST

## 2021-05-05 PROCEDURE — 97018 PARAFFIN BATH THERAPY: CPT | Performed by: OCCUPATIONAL THERAPIST

## 2021-05-05 PROCEDURE — 97110 THERAPEUTIC EXERCISES: CPT | Performed by: OCCUPATIONAL THERAPIST

## 2021-05-05 NOTE — PROGRESS NOTES
[facility]    OCCUPATIONAL THERAPY PROGRESS NOTE    Date:  2021  Initial Evaluation Date: 21    Patient Name:  Winsome Avila    :  1970  Restrictions/Precautions:  ROM as tolerated, Low fall risk  Diagnosis:  G56.02 (ICD-10-CM) - Left carpal tunnel syndrome                                           Insurance/Certification information:  [employer] Siu  Referring Physician:  Dr Martin Mcduffie  Date of Surgery/Injury: L CTR 21  Plan of care signed (Y/N):  Y  Visit# / total visits:     Pain Level: no pain at Tx start/ mildy uncomfortable with Tx     Subjective: Pt presents with no new complaints. Objective:  Updated POC to be completed by visit 8. INTERVENTION: COMPLETED: SPECIFICS/COMMENTS:   Modality:     Paraffin tx x 10 min as preconditioning prior to ex   US x L hand / wrist  5 min post activity to minimize swelling/ loosen scarring and decrease post tx discomfort   AROM/ AAROM     Tendon glides x    Median nerve glides x    Wrist ROM ex x wristciser- 8x        PROM/Stretching:     Prayer stretch 5x    Wrist PROM x All planes of motion   Manual techniques:     Scar massage x Dense near the scar/ improving-with mini vibration tool added   therabar  x Rolling over scar with moderate pressure   Strengthening:     Digital strengthening  3 pt pinch with blue resistive clips 30 pom poms   Hand strengthening x weight bearing, rolling, pinches and gross gripping  Putty tool for bottle cap  -10 coins manipulating out of putty  - digiflex (3#/ 5#)   Forearm strengthening  x -2# dumbbell, wrist flexion and extension   -red therabar   Other:                 Assessment/Comments: Pt is making Good progress toward stated plan of care. ROM and strengthening continued with good tolerance.      -Rehab Potential: Good  -Requires OT Follow Up: Yes  Time In: 4:00p           Time Out: 4:55p            Visit #: 6    Treatment Charges: Mins Units Modalities:paraffin/ US 10 1   Ther Exercise 15 1   Manual Therapy 10 1   Thera Activities 20 1   ADL/Home Mgt      Neuro Re-education     Group Therapy     Non-Billable Service Time     Other     Total Time/Units 55 4       -Response to Treatment: Pt feels she is making god progress and is better able to use her hand/ wrist during the day. Goals: Goals for pt can be seen on initial eval occurring on 4-12-21    Plan:   [x]  Continue Plan of care: Continue strengthening and scar mgmt in preparation for discharge. Pt education continues at each visit to obtain maximum benefits from skilled OT intervention.   []  [address] of care:   []  Discharge:      Bree Carvalho OT/L  [id number]

## 2021-05-06 ENCOUNTER — TREATMENT (OUTPATIENT)
Dept: OCCUPATIONAL THERAPY | Age: 51
End: 2021-05-06
Payer: COMMERCIAL

## 2021-05-06 DIAGNOSIS — G56.02 LEFT CARPAL TUNNEL SYNDROME: Primary | ICD-10-CM

## 2021-05-06 PROCEDURE — 97530 THERAPEUTIC ACTIVITIES: CPT | Performed by: OCCUPATIONAL THERAPIST

## 2021-05-06 PROCEDURE — 97140 MANUAL THERAPY 1/> REGIONS: CPT | Performed by: OCCUPATIONAL THERAPIST

## 2021-05-06 PROCEDURE — 97110 THERAPEUTIC EXERCISES: CPT | Performed by: OCCUPATIONAL THERAPIST

## 2021-05-06 PROCEDURE — 97018 PARAFFIN BATH THERAPY: CPT | Performed by: OCCUPATIONAL THERAPIST

## 2021-05-06 NOTE — PROGRESS NOTES
[facility] [place]    OCCUPATIONAL THERAPY PROGRESS NOTE    Date:  2021  Initial Evaluation Date: 21    Patient Name:  Sandra Adames    :  1970  Restrictions/Precautions:  ROM as tolerated, Low fall risk  Diagnosis:  G56.02 (ICD-10-CM) - Left carpal tunnel syndrome                                           Insurance/Certification information:  [facility]  Referring Physician:  Dr Sarah Newton  Date of Surgery/Injury: L CTR 21  Plan of care signed (Y/N):  Y  Visit# / total visits:     Pain Level: no pain at Tx start/ mildy uncomfortable with Tx     Subjective: Pt presents with no new complaints. Objective:  Updated POC to be completed by visit 8. INTERVENTION: COMPLETED: SPECIFICS/COMMENTS:   Modality:     Paraffin tx x 10 min as preconditioning prior to ex   US x L hand / wrist  5 min post activity to minimize swelling/ loosen scarring and decrease post tx discomfort   AROM/ AAROM     Tendon glides x    Median nerve glides x    Wrist ROM ex x wristciser- 8x        PROM/Stretching:     Prayer stretch 5x    Wrist PROM x All planes of motion   Manual techniques:     Scar massage x Dense near the scar/ improving-with mini vibration tool added   therabar  x Rolling over scar with moderate pressure   Strengthening:     Digital strengthening  3 pt pinch with blue resistive clips 30 pom poms   Hand strengthening x weight bearing, rolling, pinches and gross gripping  Putty tool for bottle cap  -10 coins manipulating out of putty  - digiflex (3#/ 5#)   Forearm strengthening  x -2# dumbbell, wrist flexion and extension   -red therabar   Other:                 Assessment/Comments: Pt is making Good progress toward stated plan of care. ROM and strengthening continued with good tolerance.      -Rehab Potential: Good  -Requires OT Follow Up: Yes  Time In: 3:55 p           Time Out: 4:55p            Visit #: 7    Treatment Charges: Mins Units Modalities:paraffin/ US 10 1   Ther Exercise 15 1   Manual Therapy 10 1   Thera Activities 20 1   ADL/Home Mgt      Neuro Re-education     Group Therapy     Non-Billable Service Time     Other     Total Time/Units 55 4       -Response to Treatment: Pt feels she is making good progress and has minimal complaints. Goals: Goals for pt can be seen on initial eval occurring on 4-12-21    Plan:   [x]  Continue Plan of care: Re-evaluate and prepare for discharge at next visit. Pt education continues at each visit to obtain maximum benefits from skilled OT intervention.   []  [address] of care:   []  Discharge:      Lieutenant Spurling OT/L  [id number]

## 2021-05-17 ENCOUNTER — HOSPITAL ENCOUNTER (EMERGENCY)
Age: 51
Discharge: HOME | End: 2021-05-17
Payer: COMMERCIAL

## 2021-05-17 VITALS
RESPIRATION RATE: 16 BRPM | WEIGHT: 215 LBS | BODY MASS INDEX: 34.55 KG/M2 | DIASTOLIC BLOOD PRESSURE: 78 MMHG | HEART RATE: 79 BPM | OXYGEN SATURATION: 98 % | SYSTOLIC BLOOD PRESSURE: 130 MMHG | TEMPERATURE: 98.7 F | HEIGHT: 66 IN

## 2021-05-17 DIAGNOSIS — J01.90 ACUTE SINUSITIS, RECURRENCE NOT SPECIFIED, UNSPECIFIED LOCATION: Primary | ICD-10-CM

## 2021-05-17 PROCEDURE — 96372 THER/PROPH/DIAG INJ SC/IM: CPT

## 2021-05-17 PROCEDURE — 6360000002 HC RX W HCPCS: Performed by: NURSE PRACTITIONER

## 2021-05-17 PROCEDURE — 99211 OFF/OP EST MAY X REQ PHY/QHP: CPT

## 2021-05-17 RX ORDER — M-VIT,TX,IRON,MINS/CALC/FOLIC 27MG-0.4MG
1 TABLET ORAL DAILY
COMMUNITY
End: 2021-10-21 | Stop reason: ALTCHOICE

## 2021-05-17 RX ORDER — DOXYCYCLINE HYCLATE 100 MG
100 TABLET ORAL 2 TIMES DAILY
Qty: 20 TABLET | Refills: 0 | Status: SHIPPED | OUTPATIENT
Start: 2021-05-17 | End: 2021-05-27

## 2021-05-17 RX ORDER — DEXAMETHASONE SODIUM PHOSPHATE 10 MG/ML
10 INJECTION, SOLUTION INTRAMUSCULAR; INTRAVENOUS ONCE
Status: COMPLETED | OUTPATIENT
Start: 2021-05-17 | End: 2021-05-17

## 2021-05-17 RX ORDER — THIAMINE MONONITRATE (VIT B1) 100 MG
100 TABLET ORAL DAILY
COMMUNITY

## 2021-05-17 RX ADMIN — DEXAMETHASONE SODIUM PHOSPHATE 10 MG: 10 INJECTION, SOLUTION INTRAMUSCULAR; INTRAVENOUS at 16:23

## 2021-05-17 ASSESSMENT — PAIN SCALES - GENERAL: PAINLEVEL_OUTOF10: 3

## 2021-05-17 ASSESSMENT — PAIN DESCRIPTION - LOCATION
LOCATION: NECK;HEAD
LOCATION: NECK;HEAD

## 2021-05-17 ASSESSMENT — PAIN - FUNCTIONAL ASSESSMENT: PAIN_FUNCTIONAL_ASSESSMENT: ACTIVITIES ARE NOT PREVENTED

## 2021-05-17 ASSESSMENT — PAIN DESCRIPTION - ONSET
ONSET: ON-GOING
ONSET: ON-GOING

## 2021-05-17 ASSESSMENT — PAIN DESCRIPTION - ORIENTATION: ORIENTATION: RIGHT;LEFT;POSTERIOR

## 2021-05-17 ASSESSMENT — PAIN DESCRIPTION - FREQUENCY: FREQUENCY: CONTINUOUS

## 2021-05-17 NOTE — ED PROVIDER NOTES
Department of Emergency Medicine   [address] [place] [address]  Provider Note  Admit Date/RoomTime: 2021  3:58 PM  Room:   NAME: Leida Connor  : 1970  MRN: [id number]     Chief Complaint:  Facial Pain (Pt states \"Dr Senthil Edmonds Fixed this problem years ago & I had a Nasal surgery & I think it's no longer working so I'm getting Headaches on & Off & yesterday it was Terrible\" )    History of Present Illness       Leida Connor is a 54 y.o. old female who presents to the emergency department for sinus congestion and pressure in her sinuses she believes she has a sinus infection she has had it for a month over-the-counter cold medicines are not working she also has a sinus headache. She denies any neck stiffness she denies any fever or chills denies any cough or chest pain or shortness of breath. ROS    Pertinent positives and negatives are stated within HPI, all other systems reviewed and are negative. Past Surgical History:   Procedure Laterality Date    CARPAL TUNNEL RELEASE Left 2021    Left Carpal Tunnel Release    CARPAL TUNNEL RELEASE Left 2021    LEFT CARPAL TUNNEL RELEASE performed by Kirby Dutta DO at [facility]     Social History:  reports that she has never smoked. She has never used smokeless tobacco. She reports current alcohol use. She reports that she does not use drugs. Family History: family history includes Arthritis in her father; Diabetes in her father; Other in her father and mother. Allergies: Amoxicillin    Physical Exam            ED Triage Vitals [21 1600]   BP Temp Temp Source Pulse Resp SpO2 Height Weight   130/78 98.7 °F (37.1 °C) Temporal 79 16 98 % 5' 6\" (1.676 m) 215 lb (97.5 kg)      Oxygen Saturation Interpretation: Normal.    Constitutional:  Alert, development consistent with age.   Ears:  External Ears: Bilateral normal.               TM's & External Canals: normal appearance, normal TMs Take 1 tablet by mouth 2 times daily for 10 days     Electronically signed by RACHEL Do CNP   DD: 5/17/21  **This report was transcribed using voice recognition software. Every effort was made to ensure accuracy; however, inadvertent computerized transcription errors may be present.   END OF ED PROVIDER NOTE     RACHEL Do CNP  05/17/21 [id number]

## 2021-05-18 ENCOUNTER — TREATMENT (OUTPATIENT)
Dept: OCCUPATIONAL THERAPY | Age: 51
End: 2021-05-18
Payer: COMMERCIAL

## 2021-05-18 DIAGNOSIS — G56.02 LEFT CARPAL TUNNEL SYNDROME: Primary | ICD-10-CM

## 2021-05-18 PROCEDURE — 97110 THERAPEUTIC EXERCISES: CPT | Performed by: OCCUPATIONAL THERAPIST

## 2021-05-18 PROCEDURE — 97530 THERAPEUTIC ACTIVITIES: CPT | Performed by: OCCUPATIONAL THERAPIST

## 2021-05-18 PROCEDURE — 97140 MANUAL THERAPY 1/> REGIONS: CPT | Performed by: OCCUPATIONAL THERAPIST

## 2021-05-18 PROCEDURE — 97018 PARAFFIN BATH THERAPY: CPT | Performed by: OCCUPATIONAL THERAPIST

## 2021-05-18 NOTE — PROGRESS NOTES
good accuracy. ( goal met for ROM, scar mgmt, strengthening)    2) Patient will demonstrate increased active/passive range of motion of their Left wrist/ hand to WNLs for ADL/IADL completion. (goal met)    Left UE AROM: Exceptions to WNL  Wrist flexion 0-60 to 0-78  Wrist ext 0-45\Thumb radial extension 0-50 to 0-70    3) Patient will demonstrate increased /pinch strength of at least 5 / 3 pinch pounds of their left hand. (good progress )    Dynamometer (setting 2):                              Left: 52# average to 9# average                                           Right: 45#                     Pinch Meter:              Lateral: Left= 12# to 14#    Right= 14.5#               Palmar 3 point: Left= 12.5# to 14# , Right= 13.5#    4) Patient to report decreased pain in their affected left wrist/ upper extremity from 0-5/10 to 0-1/10 or less with resistive functional use. ( pain has resolved- goal met)    5) Patient to demonstrate decreased guarding of their affected extremity from 50% to 10% or less. (guarding has resolved- goal met)     6) Patient will demonstrate a non-tender/non-adherent scar. ( mild residual tightness continues at the CT incision- good progress)    7) Patient will report IADL/ Leisure functions same as prior to diagnosis of L CTS. (Pt has returned to all activity with good use of her hand- goal met)     OT Outcomes: Quick Dash  Adm: 16% impairment  Discharge: 2% impairment     -Rehab Potential: Good  -Requires OT Follow Up: Yes  Time In: 1600           Time Out: 1700         Visit #: 8    Treatment Charges: Mins Units   Modalities:paraffin/ US 10 1   Ther Exercise 20 1   Manual Therapy 10 1   Thera Activities 20 1   ADL/Home Mgt      Neuro Re-education     Group Therapy     Non-Billable Service Time     Other     Total Time/Units 60 4       -Response to Treatment: Pt feels she is ready for discharge.   Goals: Goals for pt can be seen on initial eval occurring on 4-12-21    Plan:   [] Continue Plan of care: Pt education continues at each visit to obtain maximum benefits from skilled OT intervention. []  Alter Plan of care:   [x]  Discharge: Continue home strengthening. Discharge due to excellent progress.        Darnell Judd OT/MARK  [id number]

## 2021-06-07 ENCOUNTER — OFFICE VISIT (OUTPATIENT)
Dept: ORTHOPEDIC SURGERY | Age: 51
End: 2021-06-07
Payer: COMMERCIAL

## 2021-06-07 VITALS — WEIGHT: 215 LBS | BODY MASS INDEX: 34.7 KG/M2

## 2021-06-07 DIAGNOSIS — G56.02 LEFT CARPAL TUNNEL SYNDROME: Primary | ICD-10-CM

## 2021-06-07 PROCEDURE — 99212 OFFICE O/P EST SF 10 MIN: CPT | Performed by: ORTHOPAEDIC SURGERY

## 2021-06-07 RX ORDER — HYDROCHLOROTHIAZIDE 25 MG/1
TABLET ORAL
COMMUNITY
Start: 2021-05-13 | End: 2021-10-21 | Stop reason: ALTCHOICE

## 2021-06-07 NOTE — PROGRESS NOTES
Substance and Sexual Activity    Alcohol use: Yes     Comment: social    Drug use: No    Sexual activity: Yes     Partners: Male   Other Topics Concern    Not on file   Social History Narrative    Not on file     Social Determinants of Health     Financial Resource Strain:     Difficulty of Paying Living Expenses:    Food Insecurity:     Worried About Running Out of Food in the Last Year:     [address] in the Last Year:    Transportation Needs:     Lack of Transportation (Medical):  Lack of Transportation (Non-Medical):    Physical Activity:     Days of Exercise per Week:     Minutes of Exercise per Session:    Stress:     Feeling of Stress :    Social Connections:     Frequency of Communication with Friends and Family:     Frequency of Social Gatherings with Friends and Family:     Attends Rastafarian Services:     Active Member of Clubs or Organizations:     Attends Club or Organization Meetings:     Marital Status:    Intimate Partner Violence:     Fear of Current or Ex-Partner:     Emotionally Abused:     Physically Abused:     Sexually Abused:      Family History   Problem Relation Age of Onset    Other Mother     Arthritis Father     Diabetes Father     Other Father        REVIEW OF SYSTEMS:    General/Constitution:  (-)weight loss, (-)fever, (-)chills, (-)weakness. Skin: (-) rash,(-) psoriasis,(-) eczema, (-)skin cancer. Musculoskeletal: (-) fractures,  (-) dislocations,(-) collagen vascular disease, (-) fibromyalgia, (-) multiple sclerosis, (-) muscular dystrophy, (-) RSD,(-) joint pain (-)swelling, (-) joint pain,swelling. Neurologic: (-) epilepsy, (-)seizures,(-) brain tumor,(-) TIA, (-)stroke, (-)headaches, (-)Parkinson disease,(-) memory loss, (-) LOC. Cardiovascular: (-) Chest pain, (-) swelling in legs/feet, (-) SOB, (-) cramping in legs/feet with walking. Respiratory: (-) SOB, (-) Coughing, (-) night sweats.   GI: (-) nausea, (-) vomiting, (-) diarrhea, (-) blood in stool, (-) gastric ulcer. Psychiatric: (-) Depression, (-) Anxiety, (-) bipolar disease, (-) Alzheimer's Disease  Allergic/Immunologic: (-) allergies latex, (-) allergies metal, (-) skin sensitivity. Hematlogic: (-) anemia, (-) blood transfusion, (-) DVT/PE, (-) Clotting disorders    Constitutional:  The patient is alert and oriented x 3, appears to be stated age and in no distress. Wt 215 lb (97.5 kg)   LMP 05/10/2021   BMI 34.70 kg/m²     Skin:  Upon inspection: the skin appears warm, dry and intact. There is  a previous scar over the affected area. There is not any cellulitis, lymphedema or cutaneous lesions noted in the lower extremities. Upon palpation there is no induration noted. Neurologic:  Gait: normal;  Motor exam of the upper extremities show: The reflexes in biceps/triceps/brachioradialis are equal and symmetric. Sensory exam C5-T1 are normal bilaterallY. Cardiovascular: The vascular exam is normal and is well perfused to distal extremities. There are 2+ radial pulses bilaterally, and motor and sensation is intact to median, ulnar, and radial, musclocutaneus, and axillary nerve distribution and grossly symmetric bilaterally. There is cap refill noted less than two seconds in all digits. There is not edema of the bilateral upper extremities. There is not varicosities noted in the distal extremities. Lymph:  Upon palpation,  there is no lymphadenopathy noted in bilateral upper extremities. Musculoskeletal:  Gait: normal; examination of the nails and digits reveal no cyanosis or clubbing. Cervical Exam:  On physical exam, Anisha Razo is well-developed, well-nourished, oriented to person, place and time. her gait is normal.  On evaluation of her cervical spine, she has full range of motion of the cervical spine without pain. There is no cervical tenderness to palpation.      Shoulder Exam:  On evaluation of her bilateral upper extremities, her bilateral shoulder has no deformity. There is not evidence of scapular dyskinesis. There is not muscle atrophy in shoulder girdle. The range of motion for the Right Shoulder is 150/50/t8 and for the Left shoulder is 150/50/t8. Right shoulder Motor strength is 5/5 in the supraspinatus, 5/5 internal rotation and 5/5 in external rotation, and Left shoulder motor strength 5/5 in supraspinatus, 5/5 in internal rotation, 5/5 in external rotation. Elbow exam:  Evaluation of the elbow, reveals no signs of swelling or deformity. ROM is 0-140. There is not instability with varus/valgus stresses. Motor strength is 5/5 with flexion/extension. Wrist exam:  Inspection of the bilateral upper extremities, there is no evidence of deformity of the wrist.  ROM Wrist ROM R wrist DF 90, VF 90, L wrist DF 90, VF 90, R pronation 90/ supination 90, L pronation 90/supination 90. Motor strength is 5/5 with Dorsiflexion/Volarflexion/Supination/Pronation. Motor and sensation is intact and symmetric throughout the bilateral upper extremities in the median, ulnar and radial , musclcutaneous, and axillary nerve distributions. Hand exam:  The skin overlying the hand is  intact. There is not evidence of scar, lesion, laceration, or abrasion. The motion in the small joints of the hand are intact with no stiffness or deformity. The ROM in the MCP flexion 90/ extension 0 , PIP flexion 09/ extension 0, DIP flexion 70/ extension 0. There is not rotational deformity. There is no masses or adenopathy in bilateral upper extremities. Radial pulses are 2+ and symmetric bilaterally. Capillary refill is intact and < 2 seconds. Motor strength is 5/5 with flexion and extension of the small finger joints. Phallens sign(-), Tinnells sign (-), Median nerve compression test (-),  Finklesteins (-), [employer] Grind test (-), [employer](-).      Xrays:   n/a  Radiographic findings reviewed with patient    Impression:   Encounter Diagnosis   Name

## 2021-08-10 ENCOUNTER — HOSPITAL ENCOUNTER (EMERGENCY)
Age: 51
Discharge: HOME | End: 2021-08-10
Payer: COMMERCIAL

## 2021-08-10 VITALS
OXYGEN SATURATION: 97 % | HEIGHT: 66 IN | HEART RATE: 84 BPM | BODY MASS INDEX: 33.75 KG/M2 | WEIGHT: 210 LBS | TEMPERATURE: 98.1 F

## 2021-08-10 DIAGNOSIS — G56.01 CARPAL TUNNEL SYNDROME OF RIGHT WRIST: Primary | ICD-10-CM

## 2021-08-10 PROCEDURE — 99283 EMERGENCY DEPT VISIT LOW MDM: CPT

## 2021-08-10 RX ORDER — MELOXICAM 7.5 MG/1
7.5 TABLET ORAL DAILY
Qty: 10 TABLET | Refills: 0 | Status: SHIPPED | OUTPATIENT
Start: 2021-08-10 | End: 2021-10-21 | Stop reason: ALTCHOICE

## 2021-08-10 ASSESSMENT — PAIN SCALES - GENERAL: PAINLEVEL_OUTOF10: 7

## 2021-08-10 ASSESSMENT — PAIN DESCRIPTION - ORIENTATION: ORIENTATION: RIGHT

## 2021-08-10 ASSESSMENT — PAIN DESCRIPTION - LOCATION: LOCATION: HAND

## 2021-08-10 ASSESSMENT — PAIN DESCRIPTION - PAIN TYPE: TYPE: ACUTE PAIN;CHRONIC PAIN

## 2021-08-10 NOTE — ED PROVIDER NOTES
[facility]  Department of Emergency Medicine   ED  Encounter Note  Admit Date/RoomTime: 8/10/2021 11:21 AM  ED Room: [place] [address]/[facility]    NAME: Oralia Carrion  : 1970  MRN: [id number]     Chief Complaint:  Hand Pain (had carpal tunnel surg fe by dr ríos/ needs pain meds)    History of Present Illness       Oralia Carrion is a 54 y.o. old female who presents to the emergency department with a complaint of right wrist pain and needs pain medication. Patient states that she had bilateral carpal tunnel syndrome. Had her left wrist release done in February by Dr. Haydee Corral. Recuperated from that really well. Is waiting to get her right wrist done next. States couple days ago her right wrist started hurting her. It is all to the volar aspect and goes into her right palm. She is starting to feel the painful numbness into her thumb and index finger. States it ached her all night last night. She rates the pain a 7 out of 10. She took 2 Aleve last night, without relief. ROS   Pertinent positives and negatives are stated within HPI, all other systems reviewed and are negative. Past Medical History:  has a past medical history of Arthritis. Surgical History:  has a past surgical history that includes sinus surgery (); Carpal tunnel release (Left, 2021); and Carpal tunnel release (Left, 2021). Social History:  reports that she has never smoked. She has never used smokeless tobacco. She reports current alcohol use. She reports that she does not use drugs. Family History: family history includes Arthritis in her father; Diabetes in her father; Other in her father and mother.      Allergies: Amoxicillin    Physical Exam   Oxygen Saturation Interpretation: Normal.        ED Triage Vitals   BP Temp Temp Source Pulse Resp SpO2 Height Weight   -- 08/10/21 1055 08/10/21 1055 08/10/21 1055 -- 08/10/21 1055 08/10/21 1117 08/10/21 1117    98.1 °F (36.7 °C) Tympanic 84  97 % 5' 6\" (1.676 m) 210 lb (95.3 kg)         General:  NAD. Alert and Oriented. Well-appearing. Skin:  Warm, dry. No rashes. Head:  Normocephalic. Atraumatic. Eyes:  EOMI. Conjunctiva normal.  ENT:  Oral mucosa moist.  Airway patent. Neck:  Supple. Normal ROM. Respiratory:  No respiratory distress. No labored breathing. Lungs clear without rales, rhonchi or wheezing. Cardiovascular:  Regular rate. No Murmur. No peripheral edema. Extremities warm and good color. Extremities: Tenderness along the volar right wrist into the palm and thenar eminence. Patient can flex and extend all fingers of the right hand.  strength is slightly lessened on the right. Hand is not swollen. Not erythematous. Not warm to the touch. Right wrist is not swollen. Not erythematous. Not warm to the touch. Flexion at the right wrist does exacerbate the pain. 2+ right radial pulse. Back:  Normal ROM. Nontender to palpation. Neuro:  Alert and Oriented to person, place, time and situation. Normal LOC. Moves all extremities. Speech fluent. Psych:  Calm and Cooperative. Normal thought process. Normal judgement. Lab / Imaging Results   (All laboratory and radiology results have been personally reviewed by myself)  Labs:  No results found for this visit on 08/10/21. Imaging: All Radiology results interpreted by Radiologist unless otherwise noted. No orders to display     ED Course / Medical Decision Making   Medications - No data to display     Consult:   none    Procedure(s):  Post splint examination: Velcro right wrist splint has been applied by ED tech and/or Rn. Post-splint check and neuro exam performed by myself. Splint is appropriately applied. Neurovascular intact with good pulse, normal color and warm extremity. Instructions on what to watch for vascular compromise explained to patient and/or family at bedside.   Explained to patient and/or family this is a temporary splint and they will need to be reevaluated by Ortho specialty or their PCP. Patient and her family understand they can return to the ED at anytime for any concerns regarding extremity problem and/or splint. MDM:      Imaging was not obtained based on no suspicion for fracture / bony abnormality as per history/physical findings. Plan of Care/Counseling:  Physician Assistant on duty reviewed today's visit with the patient in addition to providing specific details for the plan of care and counseling regarding the diagnosis and prognosis. Questions are answered at this time and are agreeable with the plan. Assessment      1. Carpal tunnel syndrome of right wrist New Problem     Plan   Disposition:   Discharged home. Patient condition is good    New Medications     New Prescriptions    MELOXICAM (MOBIC) 7.5 MG TABLET    Take 1 tablet by mouth daily     Electronically signed by CITLALI Jones   DD: 8/10/21  **This report was transcribed using voice recognition software. Every effort was made to ensure accuracy; however, inadvertent computerized transcription errors may be present.   END OF ED PROVIDER NOTE       Chidi Jones  08/10/21 1135

## 2021-08-12 ENCOUNTER — HOSPITAL ENCOUNTER (OUTPATIENT)
Dept: MAMMOGRAPHY | Age: 51
Discharge: HOME | End: 2021-08-14
Payer: COMMERCIAL

## 2021-08-12 VITALS — WEIGHT: 210 LBS | BODY MASS INDEX: 33.75 KG/M2 | HEIGHT: 66 IN

## 2021-08-12 DIAGNOSIS — Z12.31 SCREENING MAMMOGRAM FOR HIGH-RISK PATIENT: ICD-10-CM

## 2021-08-12 DIAGNOSIS — Z12.31 ENCOUNTER FOR SCREENING MAMMOGRAM FOR MALIGNANT NEOPLASM OF BREAST: ICD-10-CM

## 2021-08-12 DIAGNOSIS — Z12.31 SCREENING MAMMOGRAM, ENCOUNTER FOR: ICD-10-CM

## 2021-08-12 PROCEDURE — 77067 SCR MAMMO BI INCL CAD: CPT

## 2021-09-28 ENCOUNTER — APPOINTMENT (OUTPATIENT)
Dept: ULTRASOUND IMAGING | Age: 51
End: 2021-09-28

## 2021-09-28 ENCOUNTER — HOSPITAL ENCOUNTER (EMERGENCY)
Age: 51
Discharge: HOME | End: 2021-09-28
Attending: EMERGENCY MEDICINE

## 2021-09-28 VITALS
SYSTOLIC BLOOD PRESSURE: 135 MMHG | OXYGEN SATURATION: 98 % | TEMPERATURE: 98.7 F | HEIGHT: 66 IN | WEIGHT: 226 LBS | HEART RATE: 88 BPM | DIASTOLIC BLOOD PRESSURE: 78 MMHG | RESPIRATION RATE: 18 BRPM | BODY MASS INDEX: 36.32 KG/M2

## 2021-09-28 DIAGNOSIS — N93.9 VAGINAL BLEEDING: ICD-10-CM

## 2021-09-28 DIAGNOSIS — D62 ACUTE BLOOD LOSS ANEMIA: Primary | ICD-10-CM

## 2021-09-28 LAB
ABO/RH: NORMAL
ALBUMIN SERPL-MCNC: 3.7 G/DL (ref 3.5–5.2)
ALP BLD-CCNC: 57 U/L (ref 35–104)
ALT SERPL-CCNC: 29 U/L (ref 0–32)
ANION GAP SERPL CALCULATED.3IONS-SCNC: 9 MMOL/L (ref 7–16)
ANTIBODY SCREEN: NORMAL
AST SERPL-CCNC: 30 U/L (ref 0–31)
BACTERIA: NORMAL /HPF
BASOPHILS ABSOLUTE: 0.04 E9/L (ref 0–0.2)
BASOPHILS RELATIVE PERCENT: 0.4 % (ref 0–2)
BILIRUB SERPL-MCNC: <0.2 MG/DL (ref 0–1.2)
BILIRUBIN URINE: NEGATIVE
BLOOD BANK DISPENSE STATUS: NORMAL
BLOOD BANK PRODUCT CODE: NORMAL
BLOOD, URINE: ABNORMAL
BPU ID: NORMAL
BUN BLDV-MCNC: 12 MG/DL (ref 6–20)
CALCIUM SERPL-MCNC: 8.9 MG/DL (ref 8.6–10.2)
CHLORIDE BLD-SCNC: 101 MMOL/L (ref 98–107)
CLARITY: CLEAR
CO2: 25 MMOL/L (ref 22–29)
COLOR: YELLOW
CREAT SERPL-MCNC: 0.9 MG/DL (ref 0.5–1)
DESCRIPTION BLOOD BANK: NORMAL
EOSINOPHILS ABSOLUTE: 0.21 E9/L (ref 0.05–0.5)
EOSINOPHILS RELATIVE PERCENT: 2.2 % (ref 0–6)
GFR AFRICAN AMERICAN: >60
GFR NON-AFRICAN AMERICAN: >60 ML/MIN/1.73
GLUCOSE BLD-MCNC: 99 MG/DL (ref 74–99)
GLUCOSE URINE: NEGATIVE MG/DL
HCT VFR BLD CALC: 18.4 % (ref 34–48)
HEMOGLOBIN: 6.1 G/DL (ref 11.5–15.5)
IMMATURE GRANULOCYTES #: 0.05 E9/L
IMMATURE GRANULOCYTES %: 0.5 % (ref 0–5)
KETONES, URINE: NEGATIVE MG/DL
LEUKOCYTE ESTERASE, URINE: ABNORMAL
LYMPHOCYTES ABSOLUTE: 2.9 E9/L (ref 1.5–4)
LYMPHOCYTES RELATIVE PERCENT: 30.4 % (ref 20–42)
MCH RBC QN AUTO: 30.5 PG (ref 26–35)
MCHC RBC AUTO-ENTMCNC: 33.2 % (ref 32–34.5)
MCV RBC AUTO: 92 FL (ref 80–99.9)
MONOCYTES ABSOLUTE: 0.57 E9/L (ref 0.1–0.95)
MONOCYTES RELATIVE PERCENT: 6 % (ref 2–12)
NEUTROPHILS ABSOLUTE: 5.76 E9/L (ref 1.8–7.3)
NEUTROPHILS RELATIVE PERCENT: 60.5 % (ref 43–80)
NITRITE, URINE: NEGATIVE
PDW BLD-RTO: 14.4 FL (ref 11.5–15)
PH UA: 6 (ref 5–9)
PLATELET # BLD: 517 E9/L (ref 130–450)
PMV BLD AUTO: 9.3 FL (ref 7–12)
POTASSIUM REFLEX MAGNESIUM: 4.3 MMOL/L (ref 3.5–5)
PROTEIN UA: NEGATIVE MG/DL
RBC # BLD: 2 E12/L (ref 3.5–5.5)
RBC UA: NORMAL /HPF (ref 0–2)
SODIUM BLD-SCNC: 135 MMOL/L (ref 132–146)
SPECIFIC GRAVITY UA: <=1.005 (ref 1–1.03)
TOTAL PROTEIN: 6.8 G/DL (ref 6.4–8.3)
TROPONIN, HIGH SENSITIVITY: <6 NG/L (ref 0–9)
UROBILINOGEN, URINE: 0.2 E.U./DL
WBC # BLD: 9.5 E9/L (ref 4.5–11.5)
WBC UA: NORMAL /HPF (ref 0–5)

## 2021-09-28 PROCEDURE — 86900 BLOOD TYPING SEROLOGIC ABO: CPT

## 2021-09-28 PROCEDURE — 86850 RBC ANTIBODY SCREEN: CPT

## 2021-09-28 PROCEDURE — P9016 RBC LEUKOCYTES REDUCED: HCPCS

## 2021-09-28 PROCEDURE — 76856 US EXAM PELVIC COMPLETE: CPT

## 2021-09-28 PROCEDURE — 36430 TRANSFUSION BLD/BLD COMPNT: CPT

## 2021-09-28 PROCEDURE — 80053 COMPREHEN METABOLIC PANEL: CPT

## 2021-09-28 PROCEDURE — 93005 ELECTROCARDIOGRAM TRACING: CPT | Performed by: PHYSICIAN ASSISTANT

## 2021-09-28 PROCEDURE — 86923 COMPATIBILITY TEST ELECTRIC: CPT

## 2021-09-28 PROCEDURE — 86901 BLOOD TYPING SEROLOGIC RH(D): CPT

## 2021-09-28 PROCEDURE — 85025 COMPLETE CBC W/AUTO DIFF WBC: CPT

## 2021-09-28 PROCEDURE — 76830 TRANSVAGINAL US NON-OB: CPT

## 2021-09-28 PROCEDURE — 84484 ASSAY OF TROPONIN QUANT: CPT

## 2021-09-28 PROCEDURE — 81001 URINALYSIS AUTO W/SCOPE: CPT

## 2021-09-28 PROCEDURE — 99284 EMERGENCY DEPT VISIT MOD MDM: CPT

## 2021-09-28 RX ORDER — SODIUM CHLORIDE 9 MG/ML
INJECTION, SOLUTION INTRAVENOUS
Status: DISCONTINUED
Start: 2021-09-28 | End: 2021-09-29 | Stop reason: HOSPADM

## 2021-09-28 RX ORDER — SODIUM CHLORIDE 9 MG/ML
INJECTION, SOLUTION INTRAVENOUS PRN
Status: DISCONTINUED | OUTPATIENT
Start: 2021-09-28 | End: 2021-09-29 | Stop reason: HOSPADM

## 2021-09-28 ASSESSMENT — ENCOUNTER SYMPTOMS
NAUSEA: 0
ABDOMINAL PAIN: 0
COLOR CHANGE: 0
DIARRHEA: 0
RHINORRHEA: 0
BLOOD IN STOOL: 0
COUGH: 0
VOMITING: 0
SHORTNESS OF BREATH: 1

## 2021-09-28 NOTE — ED PROVIDER NOTES
Patient presents to the ED with a complaint of dizziness as well as fatigue. Symptoms have been present for the past several days have been gradually worsening. Patient admits that she has had heavy vaginal bleeding for the entire month. It recently slowed down after she saw her OB and was placed on a pill which she thinks may be a hormone to help reset the bleeding. Since then the bleeding has slowed down considerably and she has not passed any large clots since yesterday. She states she passed a large clot yesterday. Since then the bleeding has been very mild. She presented however due to the dizziness. She also admits to being very fatigued and short of breath with exertion. No prior history of blood transfusion. Denies the presence of blood in the stool or urine. Denies any fever or chills. Patient is not on any anticoagulants. Review of Systems   Constitutional: Positive for fatigue. Negative for chills, diaphoresis and fever. HENT: Negative for congestion and rhinorrhea. Eyes: Negative for visual disturbance. Respiratory: Positive for shortness of breath. Negative for cough. Cardiovascular: Negative for chest pain, palpitations and leg swelling. Gastrointestinal: Negative for abdominal pain, blood in stool, diarrhea, nausea and vomiting. Genitourinary: Positive for vaginal bleeding. Negative for difficulty urinating, dysuria, hematuria and vaginal discharge. Musculoskeletal: Negative for arthralgias and myalgias. Skin: Negative for color change and pallor. Neurological: Positive for dizziness and light-headedness. Negative for syncope. Hematological: Does not bruise/bleed easily. All other systems reviewed and are negative. Physical Exam  Vitals and nursing note reviewed. Constitutional:       General: She is not in acute distress. Appearance: She is well-developed. She is not diaphoretic. HENT:      Head: Normocephalic and atraumatic.    Eyes: General: No scleral icterus. Conjunctiva/sclera: Conjunctivae normal.      Comments: No conjunctival pallor appreciated. Cardiovascular:      Rate and Rhythm: Normal rate and regular rhythm. Heart sounds: Normal heart sounds. No murmur heard. Pulmonary:      Effort: Pulmonary effort is normal. No respiratory distress. Breath sounds: Normal breath sounds. No wheezing or rales. Abdominal:      General: Bowel sounds are normal. There is no distension. Palpations: Abdomen is soft. Tenderness: There is no abdominal tenderness. There is no guarding or rebound. Musculoskeletal:      Cervical back: Normal range of motion and neck supple. Skin:     General: Skin is warm and dry. Coloration: Skin is not jaundiced or pale. Neurological:      Mental Status: She is alert and oriented to person, place, and time. Procedures     MDM   Patient presents to the ED for evaluation of vaginal bleeding. She was coming in because she was feeling dizzy and has been experiencing fatigue with exertion. She did admit to having heavy menstrual bleeding for the past month but was recently placed on hormone therapy by her gynecologist which has considerably slowed down the bleeding. She states she has any change her pad from this morning. CBC did show anemia with a hemoglobin of 6.1. This is changed from her most recent hemoglobin of 12.9 in January of this year. CMP was grossly unremarkable and showed no evidence of electrolyte abnormalities or renal insufficiency. Troponin was less than 6. Gynecological exam was done in the ED and showed very minimal amount of bleeding without active hemorrhage. Patient is can be discharged home with close follow-up with her gynecologist tomorrow after she has finished the blood transfusion. ED Course as of Sep 28 2217   Tue Sep 28, 2021   2023 Patient resting in bed in no acute distress. Is currently receiving her unit of blood.   She states that she has gotten up twice with use the restroom and states that the bleeding is very light. Not passing any large clots. Will consult her gynecologist.    [MS]   [phone] Patient resting comfortably in bed. Discussed results of labs. Awaiting blood transfusion. [MS]   2029 Spoke with Dr. Robinson Campbell (GYN). Discussed case. He is comfortable with the patient being discharged home after the unit of blood and he will see her in the office tomorrow. I discussed this with the patient and she was agreeable to this. [MS]   2155 Patient resting in bed no acute distress. No complaints at this time. Is actually feeling better with the amount of blood she is already received. This time pelvic exam was done with a chaperone nurse. There was a small amount of blood within the vaginal vault. No hemorrhaging. Patient is going to be comfortable being discharged home after the unit of blood is done. She plans to call the office first thing in the morning. She understands that if she has worsening symptoms or new concerns that she can return to the ED for further evaluation. [MS]      ED Course User Index  [MS] Antoine Joel, DO       --------------------------------------------- PAST HISTORY ---------------------------------------------  Past Medical History:  has a past medical history of Arthritis. Past Surgical History:  has a past surgical history that includes sinus surgery (2005); Carpal tunnel release (Left, 02/12/2021); and Carpal tunnel release (Left, 2/12/2021). Social History:  reports that she has never smoked. She has never used smokeless tobacco. She reports current alcohol use. She reports that she does not use drugs. Family History: family history includes Arthritis in her father; Breast Cancer in her paternal aunt; Diabetes in her father; Other in her father and mother. The patients home medications have been reviewed.     Allergies: Amoxicillin    -------------------------------------------------- RESULTS -------------------------------------------------  Labs:  Results for orders placed or performed during the hospital encounter of 09/28/21   CBC Auto Differential   Result Value Ref Range    WBC 9.5 4.5 - 11.5 E9/L    RBC 2.00 (L) 3.50 - 5.50 E12/L    Hemoglobin 6.1 (LL) 11.5 - 15.5 g/dL    Hematocrit 18.4 (L) 34.0 - 48.0 %    MCV 92.0 80.0 - 99.9 fL    MCH 30.5 26.0 - 35.0 pg    MCHC 33.2 32.0 - 34.5 %    RDW 14.4 11.5 - 15.0 fL    Platelets 106 (H) 665 - 450 E9/L    MPV 9.3 7.0 - 12.0 fL    Neutrophils % 60.5 43.0 - 80.0 %    Immature Granulocytes % 0.5 0.0 - 5.0 %    Lymphocytes % 30.4 20.0 - 42.0 %    Monocytes % 6.0 2.0 - 12.0 %    Eosinophils % 2.2 0.0 - 6.0 %    Basophils % 0.4 0.0 - 2.0 %    Neutrophils Absolute 5.76 1.80 - 7.30 E9/L    Immature Granulocytes # 0.05 E9/L    Lymphocytes Absolute 2.90 1.50 - 4.00 E9/L    Monocytes Absolute 0.57 0.10 - 0.95 E9/L    Eosinophils Absolute 0.21 0.05 - 0.50 E9/L    Basophils Absolute 0.04 0.00 - 0.20 E9/L   Comprehensive Metabolic Panel w/ Reflex to MG   Result Value Ref Range    Sodium 135 132 - 146 mmol/L    Potassium reflex Magnesium 4.3 3.5 - 5.0 mmol/L    Chloride 101 98 - 107 mmol/L    CO2 25 22 - 29 mmol/L    Anion Gap 9 7 - 16 mmol/L    Glucose 99 74 - 99 mg/dL    BUN 12 6 - 20 mg/dL    CREATININE 0.9 0.5 - 1.0 mg/dL    GFR Non-African American >60 >=60 mL/min/1.73    GFR African American >60     Calcium 8.9 8.6 - 10.2 mg/dL    Total Protein 6.8 6.4 - 8.3 g/dL    Albumin 3.7 3.5 - 5.2 g/dL    Total Bilirubin <0.2 0.0 - 1.2 mg/dL    Alkaline Phosphatase 57 35 - 104 U/L    ALT 29 0 - 32 U/L    AST 30 0 - 31 U/L   Troponin   Result Value Ref Range    Troponin, High Sensitivity <6 0 - 9 ng/L   Urinalysis, reflex to microscopic   Result Value Ref Range    Color, UA Yellow Straw/Yellow    Clarity, UA Clear Clear    Glucose, Ur Negative Negative mg/dL    Bilirubin Urine Negative Negative Ketones, Urine Negative Negative mg/dL    Specific Gravity, UA <=1.005 1.005 - 1.030    Blood, Urine LARGE (A) Negative    pH, UA 6.0 5.0 - 9.0    Protein, UA Negative Negative mg/dL    Urobilinogen, Urine 0.2 <2.0 E.U./dL    Nitrite, Urine Negative Negative    Leukocyte Esterase, Urine SMALL (A) Negative   Microscopic Urinalysis   Result Value Ref Range    WBC, UA 0-1 0 - 5 /HPF    RBC, UA NONE 0 - 2 /HPF    Bacteria, UA NONE SEEN None Seen /HPF   TYPE AND SCREEN   Result Value Ref Range    ABO/Rh A POS     Antibody Screen NEG    PREPARE RBC (CROSSMATCH)   Result Value Ref Range    Product Code Blood Bank [id number]     Description Blood Bank Red Blood Cells, Leuko-reduced     Unit Number [id number]     Dispense Status Blood Bank issued        Radiology:  US PELVIS COMPLETE   Final Result   1. There appears to be a septated or more likely partially septated uterus. 2.  Otherwise normal appearance of the uterus and endometrium. Normal   appearance of the bilateral ovaries. There are no adnexal masses. Normal   Doppler flow within the ovaries. US NON OB TRANSVAGINAL   Final Result   1. There appears to be a septated or more likely partially septated uterus. 2.  Otherwise normal appearance of the uterus and endometrium. Normal   appearance of the bilateral ovaries. There are no adnexal masses. Normal   Doppler flow within the ovaries. ------------------------- NURSING NOTES AND VITALS REVIEWED ---------------------------  Date / Time Roomed:  9/28/2021  5:12 PM  ED Bed Assignment:  02/02    The nursing notes within the ED encounter and vital signs as below have been reviewed.    BP (!) 143/88   Pulse 81   Temp 98.7 °F (37.1 °C) (Oral)   Resp 18   Ht 5' 6\" (1.676 m)   Wt 226 lb (102.5 kg)   LMP 09/28/2021   SpO2 96%   BMI 36.48 kg/m²   Oxygen Saturation Interpretation: Normal      ------------------------------------------ PROGRESS NOTES

## 2021-09-28 NOTE — ED TRIAGE NOTES
FIRST PROVIDER CONTACT ASSESSMENT NOTE      Department of Emergency Medicine   Admit Date: No admission date for patient encounter. Chief Complaint: Dizziness (pt has had heavy vaginal bleeding.)      History of Present Illness:   Maria Teresa Young is a 54 y.o. female who presents to the ED for heavy vaginal bleeding. Possibly anemic from so much bleeding. Exertional dyspnea, dizziness.          -----------------END OF FIRST PROVIDER CONTACT ASSESSMENT NOTE--------------  Electronically signed by CITLALI Bach   DD: 9/28/21

## 2021-09-29 NOTE — ED NOTES
Dr Ptai August at bedside for vaginal exam. Scant blood noted      Tiffanie Madera, TOOTIE  09/28/21 3158

## 2021-09-30 LAB
EKG ATRIAL RATE: 90 BPM
EKG P AXIS: 30 DEGREES
EKG P-R INTERVAL: 158 MS
EKG Q-T INTERVAL: 336 MS
EKG QRS DURATION: 70 MS
EKG QTC CALCULATION (BAZETT): 411 MS
EKG R AXIS: 46 DEGREES
EKG T AXIS: 21 DEGREES
EKG VENTRICULAR RATE: 90 BPM

## 2021-09-30 PROCEDURE — 93010 ELECTROCARDIOGRAM REPORT: CPT | Performed by: INTERNAL MEDICINE

## 2021-10-04 ENCOUNTER — HOSPITAL ENCOUNTER (EMERGENCY)
Age: 51
Discharge: HOME | End: 2021-10-05
Attending: EMERGENCY MEDICINE
Payer: COMMERCIAL

## 2021-10-04 ENCOUNTER — APPOINTMENT (OUTPATIENT)
Dept: GENERAL RADIOLOGY | Age: 51
End: 2021-10-04
Payer: COMMERCIAL

## 2021-10-04 ENCOUNTER — APPOINTMENT (OUTPATIENT)
Dept: CT IMAGING | Age: 51
End: 2021-10-04
Payer: COMMERCIAL

## 2021-10-04 VITALS
BODY MASS INDEX: 35.36 KG/M2 | HEIGHT: 66 IN | OXYGEN SATURATION: 100 % | TEMPERATURE: 98.1 F | SYSTOLIC BLOOD PRESSURE: 122 MMHG | HEART RATE: 77 BPM | DIASTOLIC BLOOD PRESSURE: 66 MMHG | RESPIRATION RATE: 16 BRPM | WEIGHT: 220 LBS

## 2021-10-04 DIAGNOSIS — R20.2 PARESTHESIAS: Primary | ICD-10-CM

## 2021-10-04 LAB
ALBUMIN SERPL-MCNC: 4.1 G/DL (ref 3.5–5.2)
ALP BLD-CCNC: 78 U/L (ref 35–104)
ALT SERPL-CCNC: 24 U/L (ref 0–32)
ANION GAP SERPL CALCULATED.3IONS-SCNC: 11 MMOL/L (ref 7–16)
APTT: 30 SEC (ref 24.5–35.1)
AST SERPL-CCNC: 20 U/L (ref 0–31)
BASOPHILS ABSOLUTE: 0.08 E9/L (ref 0–0.2)
BASOPHILS RELATIVE PERCENT: 0.5 % (ref 0–2)
BILIRUB SERPL-MCNC: 0.3 MG/DL (ref 0–1.2)
BUN BLDV-MCNC: 19 MG/DL (ref 6–20)
CALCIUM SERPL-MCNC: 8.8 MG/DL (ref 8.6–10.2)
CHLORIDE BLD-SCNC: 104 MMOL/L (ref 98–107)
CO2: 24 MMOL/L (ref 22–29)
CREAT SERPL-MCNC: 0.7 MG/DL (ref 0.5–1)
EOSINOPHILS ABSOLUTE: 0.22 E9/L (ref 0.05–0.5)
EOSINOPHILS RELATIVE PERCENT: 1.5 % (ref 0–6)
GFR AFRICAN AMERICAN: >60
GFR NON-AFRICAN AMERICAN: >60 ML/MIN/1.73
GLUCOSE BLD-MCNC: 94 MG/DL (ref 74–99)
HCG, URINE, POC: NEGATIVE
HCT VFR BLD CALC: 25 % (ref 34–48)
HEMOGLOBIN: 8 G/DL (ref 11.5–15.5)
IMMATURE GRANULOCYTES #: 0.18 E9/L
IMMATURE GRANULOCYTES %: 1.2 % (ref 0–5)
LYMPHOCYTES ABSOLUTE: 3.94 E9/L (ref 1.5–4)
LYMPHOCYTES RELATIVE PERCENT: 26.9 % (ref 20–42)
Lab: NORMAL
MCH RBC QN AUTO: 30 PG (ref 26–35)
MCHC RBC AUTO-ENTMCNC: 32 % (ref 32–34.5)
MCV RBC AUTO: 93.6 FL (ref 80–99.9)
MONOCYTES ABSOLUTE: 0.79 E9/L (ref 0.1–0.95)
MONOCYTES RELATIVE PERCENT: 5.4 % (ref 2–12)
NEGATIVE QC PASS/FAIL: NORMAL
NEUTROPHILS ABSOLUTE: 9.43 E9/L (ref 1.8–7.3)
NEUTROPHILS RELATIVE PERCENT: 64.5 % (ref 43–80)
PDW BLD-RTO: 14.7 FL (ref 11.5–15)
PLATELET # BLD: 649 E9/L (ref 130–450)
PMV BLD AUTO: 8.6 FL (ref 7–12)
POSITIVE QC PASS/FAIL: NORMAL
POTASSIUM REFLEX MAGNESIUM: 4.1 MMOL/L (ref 3.5–5)
RBC # BLD: 2.67 E12/L (ref 3.5–5.5)
REASON FOR REJECTION: NORMAL
REJECTED TEST: NORMAL
SODIUM BLD-SCNC: 139 MMOL/L (ref 132–146)
TOTAL PROTEIN: 7.6 G/DL (ref 6.4–8.3)
TROPONIN, HIGH SENSITIVITY: 15 NG/L (ref 0–9)
TROPONIN, HIGH SENSITIVITY: 6 NG/L (ref 0–9)
WBC # BLD: 14.6 E9/L (ref 4.5–11.5)

## 2021-10-04 PROCEDURE — 72125 CT NECK SPINE W/O DYE: CPT

## 2021-10-04 PROCEDURE — 99283 EMERGENCY DEPT VISIT LOW MDM: CPT

## 2021-10-04 PROCEDURE — 70450 CT HEAD/BRAIN W/O DYE: CPT

## 2021-10-04 PROCEDURE — 71046 X-RAY EXAM CHEST 2 VIEWS: CPT

## 2021-10-04 PROCEDURE — 80053 COMPREHEN METABOLIC PANEL: CPT

## 2021-10-04 PROCEDURE — 84484 ASSAY OF TROPONIN QUANT: CPT

## 2021-10-04 PROCEDURE — 93005 ELECTROCARDIOGRAM TRACING: CPT | Performed by: PHYSICIAN ASSISTANT

## 2021-10-04 PROCEDURE — 85730 THROMBOPLASTIN TIME PARTIAL: CPT

## 2021-10-04 PROCEDURE — 6360000002 HC RX W HCPCS: Performed by: EMERGENCY MEDICINE

## 2021-10-04 PROCEDURE — 36415 COLL VENOUS BLD VENIPUNCTURE: CPT

## 2021-10-04 PROCEDURE — 85025 COMPLETE CBC W/AUTO DIFF WBC: CPT

## 2021-10-04 RX ORDER — METHYLPREDNISOLONE 4 MG/1
TABLET ORAL
Qty: 1 KIT | Refills: 0 | Status: SHIPPED | OUTPATIENT
Start: 2021-10-04 | End: 2021-10-10

## 2021-10-04 RX ORDER — DEXAMETHASONE SODIUM PHOSPHATE 10 MG/ML
10 INJECTION INTRAMUSCULAR; INTRAVENOUS ONCE
Status: COMPLETED | OUTPATIENT
Start: 2021-10-04 | End: 2021-10-04

## 2021-10-04 RX ADMIN — DEXAMETHASONE SODIUM PHOSPHATE 10 MG: 10 INJECTION INTRAMUSCULAR; INTRAVENOUS at 23:30

## 2021-10-04 ASSESSMENT — ENCOUNTER SYMPTOMS
NAUSEA: 0
DIARRHEA: 0
VOMITING: 0
EYE DISCHARGE: 0
EYE PAIN: 0
SINUS PRESSURE: 0
BACK PAIN: 0
EYE REDNESS: 0
SHORTNESS OF BREATH: 0
SORE THROAT: 0
WHEEZING: 0
COUGH: 0
ABDOMINAL DISTENTION: 0

## 2021-10-04 ASSESSMENT — PAIN DESCRIPTION - LOCATION: LOCATION: ARM;SHOULDER

## 2021-10-04 ASSESSMENT — PAIN DESCRIPTION - ORIENTATION: ORIENTATION: LEFT

## 2021-10-04 ASSESSMENT — PAIN DESCRIPTION - FREQUENCY: FREQUENCY: CONTINUOUS

## 2021-10-04 ASSESSMENT — PAIN DESCRIPTION - PAIN TYPE: TYPE: ACUTE PAIN

## 2021-10-04 ASSESSMENT — PAIN DESCRIPTION - DESCRIPTORS: DESCRIPTORS: NUMBNESS

## 2021-10-05 LAB
EKG ATRIAL RATE: 75 BPM
EKG P AXIS: 47 DEGREES
EKG P-R INTERVAL: 150 MS
EKG Q-T INTERVAL: 384 MS
EKG QRS DURATION: 88 MS
EKG QTC CALCULATION (BAZETT): 428 MS
EKG R AXIS: 34 DEGREES
EKG T AXIS: 17 DEGREES
EKG VENTRICULAR RATE: 75 BPM

## 2021-10-05 PROCEDURE — 93010 ELECTROCARDIOGRAM REPORT: CPT | Performed by: INTERNAL MEDICINE

## 2021-10-05 NOTE — ED PROVIDER NOTES
Patient is a 53 y/o female who presents to the ED with left arm numbness. Patient states that she had a injection in her left upper arm a couple days ago. She states that it hurt really bad and since then she has had numbness of her entire left arm. She states that her arm feels like jello. She denies any slurred speech, blurred vision or any other numbness or weakness. She has had neck pain which she attributes to stress. She denies any chest pain, shortness of breath or abdominal pain. Review of Systems   Constitutional: Negative for chills and fever. HENT: Negative for ear pain, sinus pressure and sore throat. Eyes: Negative for pain, discharge and redness. Respiratory: Negative for cough, shortness of breath and wheezing. Cardiovascular: Negative for chest pain. Gastrointestinal: Negative for abdominal distention, diarrhea, nausea and vomiting. Genitourinary: Negative for dysuria and frequency. Musculoskeletal: Negative for arthralgias and back pain. Skin: Negative for rash and wound. Neurological: Positive for numbness. Negative for weakness and headaches. Hematological: Negative for adenopathy. All other systems reviewed and are negative. Physical Exam  Vitals and nursing note reviewed. Constitutional:       General: She is not in acute distress. Appearance: She is obese. HENT:      Head: Normocephalic and atraumatic. Right Ear: External ear normal.      Left Ear: External ear normal.      Nose: Nose normal.      Mouth/Throat:      Mouth: Mucous membranes are moist.   Eyes:      Conjunctiva/sclera: Conjunctivae normal.      Pupils: Pupils are equal, round, and reactive to light. Cardiovascular:      Rate and Rhythm: Normal rate and regular rhythm. Heart sounds: No murmur heard. Pulmonary:      Effort: Pulmonary effort is normal. No respiratory distress. Breath sounds: Normal breath sounds. No stridor. No wheezing, rhonchi or rales. Abdominal:      General: Bowel sounds are normal. There is no distension. Palpations: Abdomen is soft. Tenderness: There is no abdominal tenderness. There is no guarding. Musculoskeletal:         General: Normal range of motion. Cervical back: Normal range of motion and neck supple. No tenderness. Skin:     General: Skin is warm and dry. Findings: No rash. Neurological:      Mental Status: She is alert and oriented to person, place, and time. Cranial Nerves: No cranial nerve deficit. Sensory: Sensation is intact. No sensory deficit. Motor: Motor function is intact. No weakness. Procedures     MDM           NIH Stroke Scale/Score at time of initial evaluation:  1A: Level of Consciousness 0 - alert; keenly responsive   1B: Ask Month and Age 0 - answers both questions correctly   1C: Tell Patient To Open and Close Eyes, then Hand  Squeeze 0 - performs both tasks correctly   2: Test Horizontal Extraocular Movements 0 - normal   3: Test Visual Fields 0 - no visual loss   4: Test Facial Palsy 0 - normal symmetric movement   5A: Test Left Arm Motor Drift 0 - no drift, limb holds 90 (or 45) degrees for full 10 seconds   5B: Test Right Arm Motor Drift 0 - no drift, limb holds 90 (or 45) degrees for full 10 seconds   6A: Test Left Leg Motor Drift 0 - no drift; leg holds 30 degree position for full 5 seconds   6B: Test Right Leg Motor Drift 0 - no drift; leg holds 30 degree position for full 5 seconds   7: Test Limb Ataxia   (FNF/Heel-Shin) 0 - absent   8: Test Sensation 0 - normal; no sensory loss   9: Test Language/Aphasia 0 - no aphasia, normal   10: Test Dysarthria 0 - normal   11: Test Extinction/Inattention 0 - no abnormality   Total 0       EKG:  Normal sinus rhythm with ventricular rate of 75. WV interval, QRS duration and QT interval within normal range. Normal axis. No ST segment abnormalities to suggest acute ischemia. --------------------------------------------- PAST HISTORY ---------------------------------------------  Past Medical History:  has a past medical history of Arthritis. Past Surgical History:  has a past surgical history that includes sinus surgery (2005); Carpal tunnel release (Left, 02/12/2021); and Carpal tunnel release (Left, 2/12/2021). Social History:  reports that she has never smoked. She has never used smokeless tobacco. She reports current alcohol use. She reports that she does not use drugs. Family History: family history includes Arthritis in her father; Breast Cancer in her paternal aunt; Diabetes in her father; Other in her father and mother. The patients home medications have been reviewed.     Allergies: Amoxicillin    -------------------------------------------------- RESULTS -------------------------------------------------  Labs:  Results for orders placed or performed during the hospital encounter of 10/04/21   CBC auto differential   Result Value Ref Range    WBC 14.6 (H) 4.5 - 11.5 E9/L    RBC 2.67 (L) 3.50 - 5.50 E12/L    Hemoglobin 8.0 (L) 11.5 - 15.5 g/dL    Hematocrit 25.0 (L) 34.0 - 48.0 %    MCV 93.6 80.0 - 99.9 fL    MCH 30.0 26.0 - 35.0 pg    MCHC 32.0 32.0 - 34.5 %    RDW 14.7 11.5 - 15.0 fL    Platelets 542 (H) 864 - 450 E9/L    MPV 8.6 7.0 - 12.0 fL    Neutrophils % 64.5 43.0 - 80.0 %    Immature Granulocytes % 1.2 0.0 - 5.0 %    Lymphocytes % 26.9 20.0 - 42.0 %    Monocytes % 5.4 2.0 - 12.0 %    Eosinophils % 1.5 0.0 - 6.0 %    Basophils % 0.5 0.0 - 2.0 %    Neutrophils Absolute 9.43 (H) 1.80 - 7.30 E9/L    Immature Granulocytes # 0.18 E9/L    Lymphocytes Absolute 3.94 1.50 - 4.00 E9/L    Monocytes Absolute 0.79 0.10 - 0.95 E9/L    Eosinophils Absolute 0.22 0.05 - 0.50 E9/L    Basophils Absolute 0.08 0.00 - 0.20 E9/L   Comprehensive Metabolic Panel w/ Reflex to MG   Result Value Ref Range    Sodium 139 132 - 146 mmol/L    Potassium reflex Magnesium 4.1 3.5 - 5.0 mmol/L    Chloride 104 98 - 107 mmol/L    CO2 24 22 - 29 mmol/L    Anion Gap 11 7 - 16 mmol/L    Glucose 94 74 - 99 mg/dL    BUN 19 6 - 20 mg/dL    CREATININE 0.7 0.5 - 1.0 mg/dL    GFR Non-African American >60 >=60 mL/min/1.73    GFR African American >60     Calcium 8.8 8.6 - 10.2 mg/dL    Total Protein 7.6 6.4 - 8.3 g/dL    Albumin 4.1 3.5 - 5.2 g/dL    Total Bilirubin 0.3 0.0 - 1.2 mg/dL    Alkaline Phosphatase 78 35 - 104 U/L    ALT 24 0 - 32 U/L    AST 20 0 - 31 U/L   Troponin   Result Value Ref Range    Troponin, High Sensitivity 15 (H) 0 - 9 ng/L   APTT   Result Value Ref Range    aPTT 30.0 24.5 - 35.1 sec   SPECIMEN REJECTION   Result Value Ref Range    Rejected Test pt     Reason for Rejection see below    Troponin   Result Value Ref Range    Troponin, High Sensitivity 6 0 - 9 ng/L   POC Pregnancy Urine Qual   Result Value Ref Range    HCG, Urine, POC Negative Negative    Lot Number [id number]     Positive QC Pass/Fail Acceptable     Negative QC Pass/Fail Acceptable    EKG 12 Lead   Result Value Ref Range    Ventricular Rate 75 BPM    Atrial Rate 75 BPM    P-R Interval 150 ms    QRS Duration 88 ms    Q-T Interval 384 ms    QTc Calculation (Bazett) 428 ms    P Axis 47 degrees    R Axis 34 degrees    T Axis 17 degrees       Radiology:  XR CHEST (2 VW)   Final Result   No acute process. CT HEAD WO CONTRAST   Final Result   No acute intracranial abnormality. CT CERVICAL SPINE WO CONTRAST   Final Result   No acute abnormality of the cervical spine. Degenerative changes C5-C6.             ------------------------- NURSING NOTES AND VITALS REVIEWED ---------------------------  Date / Time Roomed:  10/4/2021 10:37 PM  ED Bed Assignment:  20/20    The nursing notes within the ED encounter and vital signs as below have been reviewed.    /66   Pulse 77   Temp 98.1 °F (36.7 °C) (Tympanic)   Resp 16   Ht 5' 6\" (1.676 m)   Wt 220 lb (99.8 kg)   LMP 09/28/2021   SpO2 100%   BMI 35.51 kg/m²   Oxygen Saturation Interpretation: Normal      ------------------------------------------ PROGRESS NOTES ------------------------------------------  I have spoken with the patient and discussed todays results, in addition to providing specific details for the plan of care and counseling regarding the diagnosis and prognosis. Their questions are answered at this time and they are agreeable with the plan. I discussed at length with them reasons for immediate return here for re evaluation. They will followup with primary care by calling their office tomorrow. --------------------------------- ADDITIONAL PROVIDER NOTES ---------------------------------  At this time the patient is without objective evidence of an acute process requiring hospitalization or inpatient management. They have remained hemodynamically stable throughout their entire ED visit and are stable for discharge with outpatient follow-up. The plan has been discussed in detail and they are aware of the specific conditions for emergent return, as well as the importance of follow-up. New Prescriptions    METHYLPREDNISOLONE (MEDROL, MARYJO,) 4 MG TABLET    USE AS DIRECTED  DISPENSE ONE PACK  NO REFILLS       Diagnosis:  1. Paresthesias        Disposition:  Patient's disposition: Discharge to home  Patient's condition is stable.          [address] [facility],   10/04/21 0458

## 2021-10-21 ENCOUNTER — HOSPITAL ENCOUNTER (OUTPATIENT)
Age: 51
Setting detail: OBSERVATION
Discharge: HOME | End: 2021-10-22
Attending: EMERGENCY MEDICINE | Admitting: OBSTETRICS & GYNECOLOGY
Payer: COMMERCIAL

## 2021-10-21 ENCOUNTER — APPOINTMENT (OUTPATIENT)
Dept: ULTRASOUND IMAGING | Age: 51
End: 2021-10-21
Payer: COMMERCIAL

## 2021-10-21 DIAGNOSIS — N93.9 VAGINAL BLEEDING: ICD-10-CM

## 2021-10-21 DIAGNOSIS — D62 ACUTE BLOOD LOSS ANEMIA: Primary | ICD-10-CM

## 2021-10-21 LAB
ABO/RH: NORMAL
ANION GAP SERPL CALCULATED.3IONS-SCNC: 9 MMOL/L (ref 7–16)
ANTIBODY SCREEN: NORMAL
BASOPHILS ABSOLUTE: 0.04 E9/L (ref 0–0.2)
BASOPHILS RELATIVE PERCENT: 0.6 % (ref 0–2)
BLOOD BANK DISPENSE STATUS: NORMAL
BLOOD BANK DISPENSE STATUS: NORMAL
BLOOD BANK PRODUCT CODE: NORMAL
BLOOD BANK PRODUCT CODE: NORMAL
BPU ID: NORMAL
BPU ID: NORMAL
BUN BLDV-MCNC: 9 MG/DL (ref 6–20)
CALCIUM SERPL-MCNC: 9 MG/DL (ref 8.6–10.2)
CHLORIDE BLD-SCNC: 106 MMOL/L (ref 98–107)
CO2: 25 MMOL/L (ref 22–29)
CREAT SERPL-MCNC: 0.8 MG/DL (ref 0.5–1)
DESCRIPTION BLOOD BANK: NORMAL
DESCRIPTION BLOOD BANK: NORMAL
EOSINOPHILS ABSOLUTE: 0.15 E9/L (ref 0.05–0.5)
EOSINOPHILS RELATIVE PERCENT: 2.3 % (ref 0–6)
GFR AFRICAN AMERICAN: >60
GFR NON-AFRICAN AMERICAN: >60 ML/MIN/1.73
GLUCOSE BLD-MCNC: 105 MG/DL (ref 74–99)
HCT VFR BLD CALC: 22.3 % (ref 34–48)
HCT VFR BLD CALC: 23.5 % (ref 34–48)
HEMOGLOBIN: 6.5 G/DL (ref 11.5–15.5)
HEMOGLOBIN: 7 G/DL (ref 11.5–15.5)
IMMATURE GRANULOCYTES #: 0.04 E9/L
IMMATURE GRANULOCYTES %: 0.6 % (ref 0–5)
LYMPHOCYTES ABSOLUTE: 2.33 E9/L (ref 1.5–4)
LYMPHOCYTES RELATIVE PERCENT: 35 % (ref 20–42)
MAGNESIUM: 2 MG/DL (ref 1.6–2.6)
MCH RBC QN AUTO: 26.7 PG (ref 26–35)
MCHC RBC AUTO-ENTMCNC: 29.1 % (ref 32–34.5)
MCV RBC AUTO: 91.8 FL (ref 80–99.9)
MONOCYTES ABSOLUTE: 0.54 E9/L (ref 0.1–0.95)
MONOCYTES RELATIVE PERCENT: 8.1 % (ref 2–12)
NEUTROPHILS ABSOLUTE: 3.56 E9/L (ref 1.8–7.3)
NEUTROPHILS RELATIVE PERCENT: 53.4 % (ref 43–80)
PDW BLD-RTO: 15.7 FL (ref 11.5–15)
PLATELET # BLD: 485 E9/L (ref 130–450)
PMV BLD AUTO: 8.8 FL (ref 7–12)
POTASSIUM REFLEX MAGNESIUM: 3.5 MMOL/L (ref 3.5–5)
RBC # BLD: 2.43 E12/L (ref 3.5–5.5)
SODIUM BLD-SCNC: 140 MMOL/L (ref 132–146)
WBC # BLD: 6.7 E9/L (ref 4.5–11.5)

## 2021-10-21 PROCEDURE — 85014 HEMATOCRIT: CPT

## 2021-10-21 PROCEDURE — 86901 BLOOD TYPING SEROLOGIC RH(D): CPT

## 2021-10-21 PROCEDURE — 99284 EMERGENCY DEPT VISIT MOD MDM: CPT

## 2021-10-21 PROCEDURE — 86900 BLOOD TYPING SEROLOGIC ABO: CPT

## 2021-10-21 PROCEDURE — 86923 COMPATIBILITY TEST ELECTRIC: CPT

## 2021-10-21 PROCEDURE — 36415 COLL VENOUS BLD VENIPUNCTURE: CPT

## 2021-10-21 PROCEDURE — 86850 RBC ANTIBODY SCREEN: CPT

## 2021-10-21 PROCEDURE — 80048 BASIC METABOLIC PNL TOTAL CA: CPT

## 2021-10-21 PROCEDURE — P9016 RBC LEUKOCYTES REDUCED: HCPCS

## 2021-10-21 PROCEDURE — 85025 COMPLETE CBC W/AUTO DIFF WBC: CPT

## 2021-10-21 PROCEDURE — G0378 HOSPITAL OBSERVATION PER HR: HCPCS

## 2021-10-21 PROCEDURE — 83735 ASSAY OF MAGNESIUM: CPT

## 2021-10-21 PROCEDURE — 76856 US EXAM PELVIC COMPLETE: CPT

## 2021-10-21 PROCEDURE — 85018 HEMOGLOBIN: CPT

## 2021-10-21 PROCEDURE — 36430 TRANSFUSION BLD/BLD COMPNT: CPT

## 2021-10-21 PROCEDURE — 76830 TRANSVAGINAL US NON-OB: CPT

## 2021-10-21 RX ORDER — ATORVASTATIN CALCIUM 80 MG/1
80 TABLET, FILM COATED ORAL NIGHTLY
COMMUNITY

## 2021-10-21 RX ORDER — CHLORAL HYDRATE 500 MG
1000 CAPSULE ORAL DAILY
COMMUNITY

## 2021-10-21 RX ORDER — MEDROXYPROGESTERONE ACETATE 10 MG/1
10 TABLET ORAL DAILY
Status: ON HOLD | COMMUNITY
End: 2021-11-15 | Stop reason: SDUPTHER

## 2021-10-21 RX ORDER — SODIUM CHLORIDE 9 MG/ML
INJECTION, SOLUTION INTRAVENOUS PRN
Status: DISCONTINUED | OUTPATIENT
Start: 2021-10-21 | End: 2021-10-22 | Stop reason: HOSPADM

## 2021-10-21 RX ORDER — SODIUM CHLORIDE 9 MG/ML
INJECTION, SOLUTION INTRAVENOUS
Status: DISPENSED
Start: 2021-10-21 | End: 2021-10-21

## 2021-10-21 RX ORDER — CELECOXIB 200 MG/1
200 CAPSULE ORAL EVERY 12 HOURS
Status: ON HOLD | COMMUNITY
End: 2021-11-15 | Stop reason: HOSPADM

## 2021-10-21 RX ORDER — BIOTIN 10 MG
1 TABLET ORAL DAILY
COMMUNITY

## 2021-10-21 RX ORDER — UREA 10 %
800 LOTION (ML) TOPICAL DAILY
COMMUNITY

## 2021-10-21 RX ORDER — FERROUS SULFATE 325(65) MG
325 TABLET ORAL 2 TIMES DAILY
COMMUNITY
End: 2022-09-20

## 2021-10-21 ASSESSMENT — ENCOUNTER SYMPTOMS
COLOR CHANGE: 0
SHORTNESS OF BREATH: 1
VOMITING: 0
COUGH: 0
BLOOD IN STOOL: 0
DIARRHEA: 0
ABDOMINAL DISTENTION: 0
ABDOMINAL PAIN: 0
RHINORRHEA: 0
NAUSEA: 0
BACK PAIN: 1

## 2021-10-21 NOTE — ED PROVIDER NOTES
Presents to the ED for evaluation. Patient has been having vaginal bleeding for the past several weeks. She was here last month and required a blood change fusion. At that time I did speak with her OB/GYN and she was can be seen in the office the next day. He is continuing to observe her. Recently placed her on a hormone pill to help with the vaginal bleeding. She started this on Monday and has to take it for 12 days. She states that since she has started taking it the bleeding has slowed down. She admits to passing small clots. No abdominal pain or cramping. No dizziness or lightheadedness. She does complain of fatigue and mild shortness of breath with exertion. She is scheduled for an ultrasound in November. He states that if there is anything there at that time he may do [place] ablation. \"  Symptoms are mild to moderate in severity and have been intermittent. Generally improving. Review of Systems   Constitutional: Positive for fatigue. Negative for chills, diaphoresis and fever. HENT: Negative for congestion and rhinorrhea. Eyes: Negative for visual disturbance. Respiratory: Positive for shortness of breath. Negative for cough. Cardiovascular: Negative for chest pain, palpitations and leg swelling. Gastrointestinal: Negative for abdominal distention, abdominal pain, blood in stool, diarrhea, nausea and vomiting. Genitourinary: Positive for vaginal bleeding. Negative for difficulty urinating and hematuria. Musculoskeletal: Positive for back pain. Negative for myalgias. Skin: Negative for color change and pallor. Neurological: Negative for dizziness, syncope and light-headedness. Hematological: Bruises/bleeds easily ( Patient is on Eliquis. ). All other systems reviewed and are negative. Physical Exam  Vitals and nursing note reviewed. Constitutional:       General: She is not in acute distress. Appearance: She is well-developed and normal weight.  She is not diaphoretic. Comments: Sitting in bed no acute distress. HENT:      Head: Normocephalic and atraumatic. Eyes:      Conjunctiva/sclera: Conjunctivae normal.      Comments: Mild conjunctival pallor   Cardiovascular:      Rate and Rhythm: Normal rate and regular rhythm. Heart sounds: Normal heart sounds. No murmur heard. Pulmonary:      Effort: Pulmonary effort is normal. No respiratory distress. Breath sounds: Normal breath sounds. No wheezing or rales. Abdominal:      General: Bowel sounds are normal. There is no distension. Palpations: Abdomen is soft. Tenderness: There is no abdominal tenderness. There is no guarding or rebound. Musculoskeletal:      Cervical back: Normal range of motion and neck supple. Skin:     General: Skin is warm and dry. Coloration: Skin is not pale. Neurological:      Mental Status: She is alert and oriented to person, place, and time. Procedures     MDM   Patient presented to the ED for evaluation of abnormal labs. Patient has a history of vaginal bleeding that is been ongoing for several weeks. She has been following with her gynecologist who recently placed on hormone therapy which has slowed the bleeding down. Hemoglobin was assessed yesterday and was found to be low. Today it is 6.5. I did give her a unit of blood. I also spoke with Dr. Ratna Kelly who would like another unit of blood and would like to watch her overnight. Ultrasound was also done in the ED which was unremarkable. Patient is agreeable to admission. ED Course as of Oct 21 1416   Thu Oct 21, 2021   1042 Patient resting in bed in no significant distress. Hemoglobin is 6.5. 1 unit of blood has been ordered. Discussed results of other labs with patient as well. [MS]   2211 [id number] Patient resting in bed comfortably. Discussed results of ultrasound with her. She has no complaints at this time. The unit of blood has been transfused.   We will discussed results of labs and ultrasound with her gynecologist.    [MS]   [phone] with Dr. Brian Cleary (GYN). Discussed case. He will admit this patient. He would like 1 more unit of blood given. [MS]      ED Course User Index  [MS] Marcus Mccloud, DO       --------------------------------------------- PAST HISTORY ---------------------------------------------  Past Medical History:  has a past medical history of Arthritis and Cerebral artery occlusion with cerebral infarction ([facility] [address] Past Surgical History:  has a past surgical history that includes sinus surgery (2005); Carpal tunnel release (Left, 02/12/2021); and Carpal tunnel release (Left, 2/12/2021). Social History:  reports that she has never smoked. She has never used smokeless tobacco. She reports current alcohol use. She reports that she does not use drugs. Family History: family history includes Arthritis in her father; Breast Cancer in her paternal aunt; Diabetes in her father; Other in her father and mother. The patients home medications have been reviewed.     Allergies: Amoxicillin    -------------------------------------------------- RESULTS -------------------------------------------------    LABS:  Results for orders placed or performed during the hospital encounter of 10/21/21   CBC Auto Differential   Result Value Ref Range    WBC 6.7 4.5 - 11.5 E9/L    RBC 2.43 (L) 3.50 - 5.50 E12/L    Hemoglobin 6.5 (LL) 11.5 - 15.5 g/dL    Hematocrit 22.3 (L) 34.0 - 48.0 %    MCV 91.8 80.0 - 99.9 fL    MCH 26.7 26.0 - 35.0 pg    MCHC 29.1 (L) 32.0 - 34.5 %    RDW 15.7 (H) 11.5 - 15.0 fL    Platelets 599 (H) 181 - 450 E9/L    MPV 8.8 7.0 - 12.0 fL    Neutrophils % 53.4 43.0 - 80.0 %    Immature Granulocytes % 0.6 0.0 - 5.0 %    Lymphocytes % 35.0 20.0 - 42.0 %    Monocytes % 8.1 2.0 - 12.0 %    Eosinophils % 2.3 0.0 - 6.0 %    Basophils % 0.6 0.0 - 2.0 %    Neutrophils Absolute 3.56 1.80 - 7.30 E9/L    Immature Granulocytes # 0.04 E9/L    Lymphocytes Absolute 2.33 1.50 - 4.00 E9/L    Monocytes Absolute 0.54 0.10 - 0.95 E9/L    Eosinophils Absolute 0.15 0.05 - 0.50 E9/L    Basophils Absolute 0.04 0.00 - 0.20 B7/M   Basic Metabolic Panel w/ Reflex to MG   Result Value Ref Range    Sodium 140 132 - 146 mmol/L    Potassium reflex Magnesium 3.5 3.5 - 5.0 mmol/L    Chloride 106 98 - 107 mmol/L    CO2 25 22 - 29 mmol/L    Anion Gap 9 7 - 16 mmol/L    Glucose 105 (H) 74 - 99 mg/dL    BUN 9 6 - 20 mg/dL    CREATININE 0.8 0.5 - 1.0 mg/dL    GFR Non-African American >60 >=60 mL/min/1.73    GFR African American >60     Calcium 9.0 8.6 - 10.2 mg/dL   Magnesium   Result Value Ref Range    Magnesium 2.0 1.6 - 2.6 mg/dL   TYPE AND SCREEN   Result Value Ref Range    ABO/Rh A POS     Antibody Screen NEG    PREPARE RBC (CROSSMATCH), 1 Units   Result Value Ref Range    Product Code Blood Bank [id number]     Description Blood Bank Red Blood Cells, Leuko-reduced     Unit Number [id number]     Dispense Status Blood Bank issued        RADIOLOGY:  US PELVIS COMPLETE   Final Result   Endometrium measures up to 9 mm in thickness, which is within normal limits   for a premenopausal patient. 3.4 cm simple left ovarian cyst.         US NON OB TRANSVAGINAL   Final Result   Endometrium measures up to 9 mm in thickness, which is within normal limits   for a premenopausal patient. 3.4 cm simple left ovarian cyst.               ------------------------- NURSING NOTES AND VITALS REVIEWED ---------------------------  Date / Time Roomed:  10/21/2021  8:58 AM  ED Bed Assignment:  02/02    The nursing notes within the ED encounter and vital signs as below have been reviewed.      Patient Vitals for the past 24 hrs:   BP Temp Temp src Pulse Resp SpO2   10/21/21 1344 129/73 98.7 °F (37.1 °C) Oral 83 20 100 %   10/21/21 1232 105/71 -- -- 67 21 100 %   10/21/21 1217 126/78 -- -- 79 19 100 %   10/21/21 1215 128/70 98 °F (36.7 °C) Oral 79 20 100 %   10/21/21 1144 108/63 98.5 °F (36.9 °C) -- 68 15 100 % 10/21/21 1125 (!) 122/59 98.3 °F (36.8 °C) -- 87 20 99 %   10/21/21 0856 131/63 -- -- -- 20 --   10/21/21 0854 -- 98.2 °F (36.8 °C) Temporal 95 (!) 2 98 %   10/21/21 0849 -- -- Temporal -- -- --       Oxygen Saturation Interpretation: Normal    ------------------------------------------ PROGRESS NOTES ------------------------------------------  Re-evaluation(s):   See ED course above    Counseling:  I have spoken with the patient and discussed todays results, in addition to providing specific details for the plan of care and counseling regarding the diagnosis and prognosis. Their questions are answered at this time and they are agreeable with the plan of admission.    --------------------------------- ADDITIONAL PROVIDER NOTES ---------------------------------  Consultations:  Time: 1414. Spoke with Dr. Iman Wakefield.  Discussed case. He will admit the patient. This patient's ED course included: a personal history and physicial examination, re-evaluation prior to disposition, multiple bedside re-evaluations, IV medications, cardiac monitoring, continuous pulse oximetry and complex medical decision making and emergency management    This patient has remained hemodynamically stable during their ED course. Critical care:  Please note that the withdrawal or failure to initiate urgent interventions for this patient would likely result in a life threatening deterioration or permanent disability. Systems at risk for deterioration include: symptomatic blood loss anemia requiring transfusion in the ED. Accordingly this patient received 32 minutes of critical care time, excluding separately billable procedures. Diagnosis:  1. Acute blood loss anemia    2. Vaginal bleeding        Disposition:  Patient's disposition: Admit to OB  Patient's condition is fair.                Annabelle Dockeryk, DO  10/21/21 5130

## 2021-10-21 NOTE — ED NOTES
US called and stated pt needs a full bladder. Patient given pitcher of water and instructed to press call light when bladder feels full. Patient verbalized understanding.       Rocael Park RN  10/21/21 4615

## 2021-10-21 NOTE — ACP (ADVANCE CARE PLANNING)
Advance Care Planning     Advance Care Planning Activator (Inpatient)  Conversation Note      Date of ACP Conversation: 10/21/2021     Conversation Conducted with: Patient with Decision Making Capacity    ACP Activator: Sirisha Bernabe:     Current Designated Health Care Decision Maker:     Primary Decision Maker: Hunter López - Parent - [phone]    Secondary Decision Maker: Anibal Landry - Parent - [phone]  Click here to complete Healthcare Decision Makers including section of the Healthcare Decision Maker Relationship (ie \"Primary\")  Today we documented Decision Maker(s) consistent with Legal Next of Kin hierarchy. Care Preferences    Ventilation: \"If you were in your present state of health and suddenly became very ill and were unable to breathe on your own, what would your preference be about the use of a ventilator (breathing machine) if it were available to you? \"      Would the patient desire the use of ventilator (breathing machine)?: yes    \"If your health worsens and it becomes clear that your chance of recovery is unlikely, what would your preference be about the use of a ventilator (breathing machine) if it were available to you? \"     Would the patient desire the use of ventilator (breathing machine)?: No      Resuscitation  \"CPR works best to restart the heart when there is a sudden event, like a heart attack, in someone who is otherwise healthy. Unfortunately, CPR does not typically restart the heart for people who have serious health conditions or who are very sick. \"    \"In the event your heart stopped as a result of an underlying serious health condition, would you want attempts to be made to restart your heart (answer \"yes\" for attempt to resuscitate) or would you prefer a natural death (answer \"no\" for do not attempt to resuscitate)? \" yes       [] Yes   [x] No   Educated Patient / Katt Cochise regarding differences between Advance Directives and portable DNR orders.     Length of ACP Conversation in minutes: 11     Conversation Outcomes:  [x] ACP discussion completed  [] Existing advance directive reviewed with patient; no changes to patient's previously recorded wishes  [] New Advance Directive completed  [] Portable Do Not Rescitate prepared for Provider review and signature  [] POLST/POST/MOLST/MOST prepared for Provider review and signature      Follow-up plan:    [] Schedule follow-up conversation to continue planning  [] Referred individual to Provider for additional questions/concerns   [] Advised patient/agent/surrogate to review completed ACP document and update if needed with changes in condition, patient preferences or care setting    [x] This note routed to one or more involved healthcare providers

## 2021-10-21 NOTE — CARE COORDINATION
SS Note: No Covid test. Pt boarded in ED, presented for vaginal bleeding & found to have Acute blood loss anemia - hemoglobin 6.5 and required transfusion in ED. Pt seen for same 9/28/21 and was able to be d/c'd after transfusion. She was seen by OBGYN and he placed her on hormone pill to help with the vaginal bleeding. SW met w/pt in ED 02 and explained role. Pt is single & lives alone in 1 story home. 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 She reports having stroke earlier this month (seen @ [facility] and then [facility] [place] Typically, pt is independent in ADL's, drives and has insurance. PCP is [address] [place] [address] Dr herrera [facility] [address] Pt has following DME: none. Denies hx of [facility] or [facility] but has gone to out-pt therapy. No hx of 02. SW completed ACP w/pt. Pt plans on returning home she just wants to know what is causing her bleeding.   Electronically signed by BLAKE Klein on 10/21/2021 at 5:26 PM

## 2021-10-22 VITALS
BODY MASS INDEX: 35.36 KG/M2 | RESPIRATION RATE: 14 BRPM | SYSTOLIC BLOOD PRESSURE: 125 MMHG | OXYGEN SATURATION: 99 % | DIASTOLIC BLOOD PRESSURE: 80 MMHG | HEIGHT: 66 IN | TEMPERATURE: 98.3 F | HEART RATE: 82 BPM | WEIGHT: 220 LBS

## 2021-10-22 PROBLEM — N85.2 UTERINE HYPERTROPHY: Status: ACTIVE | Noted: 2021-10-22

## 2021-10-22 PROBLEM — D62 ACUTE BLOOD LOSS ANEMIA: Status: ACTIVE | Noted: 2021-10-22

## 2021-10-22 PROBLEM — Z51.89 ENCOUNTER FOR BLOOD TRANSFUSION: Status: ACTIVE | Noted: 2021-10-22

## 2021-10-22 LAB
HCT VFR BLD CALC: 26.3 % (ref 34–48)
HCT VFR BLD CALC: 30 % (ref 34–48)
HEMOGLOBIN: 8.5 G/DL (ref 11.5–15.5)
HEMOGLOBIN: 9.4 G/DL (ref 11.5–15.5)

## 2021-10-22 PROCEDURE — 6360000002 HC RX W HCPCS: Performed by: OBSTETRICS & GYNECOLOGY

## 2021-10-22 PROCEDURE — 85018 HEMOGLOBIN: CPT

## 2021-10-22 PROCEDURE — 36415 COLL VENOUS BLD VENIPUNCTURE: CPT

## 2021-10-22 PROCEDURE — 85014 HEMATOCRIT: CPT

## 2021-10-22 PROCEDURE — G0378 HOSPITAL OBSERVATION PER HR: HCPCS

## 2021-10-22 PROCEDURE — M0243 CASIRIVI AND IMDEVI INFUSION: HCPCS

## 2021-10-22 PROCEDURE — 6370000000 HC RX 637 (ALT 250 FOR IP): Performed by: OBSTETRICS & GYNECOLOGY

## 2021-10-22 RX ORDER — MEDROXYPROGESTERONE ACETATE 5 MG/1
10 TABLET ORAL DAILY
Status: DISCONTINUED | OUTPATIENT
Start: 2021-10-22 | End: 2021-10-22 | Stop reason: HOSPADM

## 2021-10-22 RX ORDER — ATORVASTATIN CALCIUM 40 MG/1
80 TABLET, FILM COATED ORAL NIGHTLY
Status: DISCONTINUED | OUTPATIENT
Start: 2021-10-22 | End: 2021-10-22 | Stop reason: HOSPADM

## 2021-10-22 RX ORDER — CELECOXIB 100 MG/1
200 CAPSULE ORAL 2 TIMES DAILY
Status: DISCONTINUED | OUTPATIENT
Start: 2021-10-22 | End: 2021-10-22 | Stop reason: HOSPADM

## 2021-10-22 RX ORDER — ATORVASTATIN CALCIUM 10 MG/1
10 TABLET, FILM COATED ORAL NIGHTLY
Status: CANCELLED | OUTPATIENT
Start: 2021-10-22

## 2021-10-22 RX ORDER — FERROUS SULFATE 325(65) MG
325 TABLET ORAL 2 TIMES DAILY WITH MEALS
Status: DISCONTINUED | OUTPATIENT
Start: 2021-10-22 | End: 2021-10-22 | Stop reason: HOSPADM

## 2021-10-22 RX ADMIN — APIXABAN 5 MG: 5 TABLET, FILM COATED ORAL at 14:46

## 2021-10-22 RX ADMIN — CELECOXIB 200 MG: 100 CAPSULE ORAL at 14:46

## 2021-10-22 RX ADMIN — MEDROXYPROGESTERONE ACETATE 10 MG: 5 TABLET ORAL at 14:47

## 2021-10-22 RX ADMIN — METHOTREXATE SODIUM 20 MG: 2.5 TABLET ORAL at 14:47

## 2021-10-22 ASSESSMENT — PAIN SCALES - GENERAL: PAINLEVEL_OUTOF10: 0

## 2021-10-22 NOTE — H&P
Department of Gynecology  H&P Note          CHIEF COMPLAINT:   Vaginal bleeding with severe anemia    History obtained from patient, electronic medical record    HISTORY OF PRESENT ILLNESS:     The patient is a 54 y.o. female with significant past medical history of enlarged uterus who presents to emergency room with severe bleeding and severe anemia. Patient seen her primary doctor today and was sent to the ER secondary to finding of a hemoglobin of 6.5. Patient was evaluated in the emergency room and was given 1 unit of blood. She reports having heavy menstrual.'s lasting up to 10 days. Patient was seen in my office about couple days ago and had an ultrasound that was within normal limits except for enlarged uterus. Also she has history of stroke for which she has been taking Eliquis p.o. Her periods has been increasingly severe and she was placed on Provera with mild to moderate improvement since the last month. Past Medical History:        Diagnosis Date    Arthritis     rheumatoid     Cerebral artery occlusion with cerebral infarction [facility])      Past Surgical History:        Procedure Laterality Date    CARPAL TUNNEL RELEASE Left 02/12/2021    Left Carpal Tunnel Release    CARPAL TUNNEL RELEASE Left 2/12/2021    LEFT CARPAL TUNNEL RELEASE performed by Fercho Meza DO at [facility], [facility]  2005       Past Gynecological History:    1. Last menstrual period: October 12, 2021  2. Menses: interval:  3 weeks  duration:  10 day(s)  3. Contraception: None  4. Sexually transmitted disease history: none        A.  Number of sexual partners in the last 6 months: 1    meds:  Current Facility-Administered Medications:     methotrexate (RHEUMATREX) chemo tablet 20 mg, 20 mg, Oral, Weekly, Elías Hodges MD, 20 mg at 10/22/21 1447    apixaban (ELIQUIS) tablet 5 mg, 5 mg, Oral, BID, Elías Hodges MD, 5 mg at 10/22/21 1446    medroxyPROGESTERone (PROVERA) tablet 10 mg, 10 mg, Oral, Daily, Elías Hodges MD, 10 mg at 10/22/21 1447    celecoxib (CELEBREX) capsule 200 mg, 200 mg, Oral, BID, Elías Hodges MD, 200 mg at 10/22/21 1446    ferrous sulfate (IRON 325) tablet 325 mg, 325 mg, Oral, BID WC, Elías Hodges MD    atorvastatin (LIPITOR) tablet 80 mg, 80 mg, Oral, Nightly, Elías Hodges MD    0.9 % sodium chloride infusion, , IntraVENous, PRN, Jose Carvajal, DO    0.9 % sodium chloride infusion, , IntraVENous, PRN, Cherry Munoz MD    0.9 % sodium chloride infusion, , IntraVENous, PRN, Cherry Munoz MD       Allergies:  Amoxicillin     Social History:  TOBACCO:   reports that she has never smoked. She has never used smokeless tobacco.  ETOH:   reports current alcohol use. DRUGS:   reports no history of drug use.     Family History:       Problem Relation Age of Onset    Other Mother     Arthritis Father     Diabetes Father     Other Father     Breast Cancer Paternal Aunt        Review of Systems  Review of Systems -  General ROS: negative for - chills, fatigue or malaise  ENT ROS: negative for - hearing change, nasal congestion or nasal discharge  Allergy and Immunology ROS: negative for - hives, itchy/watery eyes or nasal congestion  Hematological and Lymphatic ROS: negative for - blood clots, blood transfusions, bruising or fatigue  Endocrine ROS: negative for - malaise/lethargy, mood swings, palpitations or polydipsia/polyuria  Breast ROS: negative for - new or changing breast lumps or nipple changes  Respiratory ROS: negative for - sputum changes, stridor, tachypnea or wheezing  Cardiovascular ROS: negative for - irregular heartbeat, loss of consciousness, murmur or orthopnea  Gastrointestinal ROS: negative for - constipation, diarrhea, gas/bloating, heartburn or hematemesis  Genito-Urinary ROS: negative for -  genital discharge, genital ulcers or hematuria  Musculoskeletal ROS: negative for - gait disturbance, muscle pain or muscular weakness       PHYSICAL EXAM:    Vitals:  /80   Pulse 82   Temp 98.3 °F (36.8 °C) (Oral)   Resp 14   Ht 5' 6\" (1.676 m)   Wt 220 lb (99.8 kg)   LMP 09/28/2021   SpO2 99%   BMI 35.51 kg/m²     General appearance:  NAD  Pyscho/social: neg for tremors and hallucinations  Head: NCAT, PERRLA, EOMI, red conjunctiva  Neck: supple, no masses  Lungs: CTAB, equal chest rise bilateral  Heart: Reg rate  Abdomen: soft, moderately tender in RUQ, nondistended  Skin; no lesions  Gu: no cva tenderness  Extremities: extremities normal, atraumatic, no cyanosis or edema    External Genitalia: General appearance; normal, Hair distribution; normal, Lesions absent  Urethral Meatus: Size normal, Location normal, Lesions absent, Prolapse absent  Vagina: General appearance normal, Estrogen effect normal, Discharge absent, Lesions absent, Pelvic support normal  Cervix: General appearance normal, Lesions absent, Discharge absent, Tenderness absent, Enlargement absent, Nodularity absent  Uterus: 12 weeks size with regular borders  Adenexa: Masses absent, Tenderness absent, Enlargement absent    DATA:  Labs:    CBC:   Lab Results   Component Value Date    WBC 6.7 10/21/2021    RBC 2.43 10/21/2021    HGB 9.4 10/22/2021    HCT 30.0 10/22/2021    MCV 91.8 10/21/2021    RDW 15.7 10/21/2021     10/21/2021       IMPRESSION/RECOMMENDATIONS:      Active Problems:    Vaginal bleeding  Plan:  We will transfuse with another unit of packed RBCs and monitor for any vaginal bleeding

## 2021-10-22 NOTE — PROGRESS NOTES
Feeling much better post transfusion. Denies any headaches. Patient has been ambulating with no dizziness or lightheadedness. Blood pressure 125/80, pulse 82, temperature 98.3 °F (36.8 °C), temperature source Oral, resp. rate 14, height 5' 6\" (1.676 m), weight 220 lb (99.8 kg), last menstrual period 09/28/2021, SpO2 99 %, not currently breastfeeding. H&H improved to 9.4 and 30.0    Reports scant vaginal bleeding if any.     Assessment  Vaginal bleeding, resolved  Uterine hypertrophy  Acute blood loss anemia, status post blood transfusion    Plan  We will discharge home and follow-up in the office in 1 week

## 2021-10-22 NOTE — PROGRESS NOTES
Patient observed for first 15 minutes during blood transfusion initiation. No reaction observed. Patient resting quietly at this time and denies any c/o's.

## 2021-10-22 NOTE — PROGRESS NOTES
Assuming care of patient. Patient resting in bed with no complaints of this time. Scant amount of old blood noted on pad; patient denies experiencing any active bleeding. No other concerns offered. Plan of care discussed with patient; patient verbalizes understanding. Call light in reach.

## 2021-10-22 NOTE — PROGRESS NOTES
Patient arrived to unit via transfer from the Emergency department. Patient states that she has had vaginal bleeding and clots for the last 9 weeks. In the beginig of this incidence, her hemoglobin dropped and she needed a blood transfusion. On October 3rd, she had a stroke and was hospitalized at [address] [place] [address]. The last couple of weeks, the bleeding has gotten heavier again due to the blood thinners she is on from her stroke. Seen her Rheumatoidologist this morning who she told this too and that she was dizzy. He adonay labs on her and her hemoglobin was 6.7 and advised her to head straight to the ED. In ED her hemoglobin was 6.5. She received 1 unit of blood and the dizziness went away. Hemoglobin back up to 7 at this time. Patient states that she has no complaints other than the bleeding. She is nonsymptomatic and has absolutely no pain. Patient is resting quietly at this time. Patient oriented to room with call light in reach.

## 2021-10-22 NOTE — PROGRESS NOTES
Dr. Chip Chapin notified of pt arrival, dr da silva for Dr. Bret Johnson. Reported complaints, assessment, vital signs, comfort level and current labs. Orders received at this time.

## 2021-10-28 ENCOUNTER — EVALUATION (OUTPATIENT)
Dept: OCCUPATIONAL THERAPY | Age: 51
End: 2021-10-28
Payer: COMMERCIAL

## 2021-10-28 DIAGNOSIS — I63.9 CEREBROVASCULAR ACCIDENT (CVA), UNSPECIFIED MECHANISM (HCC): Primary | ICD-10-CM

## 2021-10-28 PROCEDURE — 97166 OT EVAL MOD COMPLEX 45 MIN: CPT | Performed by: OCCUPATIONAL THERAPIST

## 2021-10-28 NOTE — PROGRESS NOTES
OCCUPATIONAL THERAPY INITIAL EVALUATION    [facility] ANCILLARY SERVICES  [facility] OCCUPATIONAL THERAPY   [facility] [address] [place] [address]  Dept: [phone] OT Fax: [phone]    Date:  10/28/2021  Initial Evaluation Date: 10-28-21     Evaluating Therapist: Rebekah Albright OT/L  [id number]    Patient Name:  Angel Duggan    :  1970    Restrictions/Precautions:  No lifting over 5#, Low fall risk, Hx anemia and RA  Diagnosis:  CVA  Unspecified (163.9)       Date of Surgery/Injury:     Insurance/Certification information:  [address] of care signed (Y/N): N  Visit# / total visits: Altagracia Gear /  recommended    Referring Practitioner:  Dr Zo Myers  Specific Practitioner Orders: OT    Assessment of current deficits   [] Functional mobility   [x] ADLs  [x] Strength   [] Cognition   [] Functional transfers   [x] IADLs  [] Safety Awareness  [x] Endurance   [x] Fine Motor Coordination  [] Balance  [x] Vision/perception  [] Sensation    [x] Gross Motor Coordination [] ROM  [] Pain   [] Edema    [] Scar Adhesion/Skin Integrity     OT PLAN OF CARE   OT POC based on physician orders, patient diagnosis and results of clinical assessment    Frequency/Duration: 2x/ week x 6 weeks  Specific OT Treatment to include:     [x] Instruction in HEP                   Modalities:  [x] Therapeutic Exercise        [] Ultrasound               [] Electrical Stimulation/Attended  [] PROM/Stretching                    [] Fluidotherapy          []  Paraffin                   [] AAROM  [x] AROM                 [] Iontophoresis:   [] Tendon Glides                                               [] Neuromuscular Re-Ed            [] ADL/IADL re-training    [x] Therapeutic Activity       [] Pain Management with/without modalities PRN                 [] Manual Therapy                      [] Splinting                      [] Scar Management                   []Joint Protection/Training  []Ergonomics [] Joint Mobilization        [] Adaptive Equipment Assessment/Training                             [] Manual Edema Mobilization   [] Myofascial Release                 [x] Energy Conservation/Work Simplification  [x] GM/FM Coordination       [] Safety retraining/education per  individual diagnosis/goals  [] Desensitization       Patient Specific Goal: \" To get my hand back to normal\". GOALS (Long term same as Short term):  1) Patient will demonstrate good understanding of home program (exercises/activities/diagnosis/prognosis/goals) with good accuracy. 2) Patient will demonstrate increased L UE strength to 4+/5  for ADL/IADL completion. 3) Patient will demonstrate increased /pinch strength of at least 10  of their left hand. 4) Increase in fine motor function as evidenced by decreased time to complete 9-hole peg test and/or MRMT test by at least 5-10 seconds. 5) Patient will report ADL functions as I with improved functional use of the left arm. 6) Patient will demonstrate improved functional activity tolerance from fair to good for ADL/IADL completion. 7) Patient will decrease QuickDASH score to 15% or less for increased participation in daily functional activities. Past Medical History:   Past Medical History:   Diagnosis Date    Arthritis     rheumatoid     Cerebral artery occlusion with cerebral infarction [facility])      Past Surgical History:   Past Surgical History:   Procedure Laterality Date    CARPAL TUNNEL RELEASE Left 02/12/2021    Left Carpal Tunnel Release    CARPAL TUNNEL RELEASE Left 2/12/2021    LEFT CARPAL TUNNEL RELEASE performed by Frances Chandler DO at [facility], [place]  2005       Reason for Referral: Pt present with a Hx of gradual onset left arm/ facial numbness, slurred speech and left sided weakness. Her symptoms started on 10/3/21 and she was Dx with the stroke on 10/5/21.  Pt was transferred to [facility] for medical management and now presents for outpatient therapy. Her main complaints today include left sided weakness, overall fatigue and decreased control of the left hand. Home Living: normally lives alone/ is temporarily living with her parents  Prior Level of Function: Independent    Cognition:   Alert/Oriented x3 , food historian, some difficulties with word finding. Speech is better but remains more challenging than before the stroke. 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 IADL STATUS:   Ind Mod I Min A Mod A Max A Dep Other   Homemaking Responsibility  x     Doing light tasks only   Shopping Responsibility:    x   Family helping   Mode of Transportation: car      x Not driving since illness   Leisure & Hobbies: golf , exercise      x Not able   Work: teacher       On leave     Comments: All daily tasks require increased effort, increased time. Pt says she fatigues very easily partially due to her stroke and also her issue relating to menstral cycles. Pt has required 2-3 transfusions over the last months.      ADL STATUS:   Ind Mod I Min A Mod A Max A Dep Other   Feeding:  x     Diff cutting   Grooming:  x        Bathing:  x        UE Dressing:  x        LE Dressing:  x     Avoids fasteners but is able   Toileting: x         Transfers: x             Pain Level: No pain reported except for occasional mild HA in the back of the head    UE Assessment: Ambidexterous    R UE AROM and strength: WNL    L UE AROM: WNL but stiff    L UE strength:  Shld 3+/5  Elbow 3+ to 4-/5  Wrist 5/5    Dynamometer (setting 2):     Left: 36#      Right: 60#    Pinch Meter:   Lateral: Left= 12#,Right= 15#        Sensation: L  Able To Sense (Y) / Unable to Sense (N)  SEMMES-KARI Thumb 2nd Digit 3rd Digit  4th Digit  5th Digit    Normal Touch  Size: 2.83 x x x x x   Diminished Light Touch   Size: 3.61        Diminished Protective Sense  Size: 4.31        Loss of Protective Sense Size: 4.56        Loss of Sensation  Size: 6.65        Comments: Pt reports the left arm feels \"weird\". Sensation testing is [facility]. Tone: Normal    Vision / Perception: visual fields are intact. Perception is WFL. Pt does report some blurriness in vision with intermittent difficulty focusing her eyes. Edema Description/Circumferential Measurements: NA     9 Hole Peg Test:   Left: 39 sec   Comments: Hand movement is accurate but has mild dysmetria and difficulty with in hand manipulation. Pt reports difficulties texting and typing with over reliance on the right hand. QuickDASH Score: 29.5% disability     Eval Complexity: Moderate  Profile and History- ED notes, interview  Assessment of Occupational Performance and Identification of Deficits- 7 performance deficits   Clinical Decision Making- no lifting over 5#/ co-morbidity RA and anemia    Rehab Potential:                                 [x] Good  [] Fair  [] Poor        Suggested Professional Referral:       [x] No  [] Yes:  Barriers to Goal Achievement[de-identified]          [x] No  [] Yes:  Domestic Concerns:                           [x] No  [] Yes:       Patient. Education:  [x] Plans/Goals, Risks/Benefits discussed  [] Home exercise program  Method of Education: [x] Verbal  [] Demo  [] Written  Comprehension of Education:  [x] Verbalizes understanding. [] Demonstrates understanding. [] Needs Review. [] Demonstrates/verbalizes understanding of HEP/Ed previously given.         Patient understands diagnosis/prognosis and consents to treatment, plan and goals:   [x] Yes    [] No     Time In: 1430            Time Out: 1520                      Timed Code Treatment Minutes: 50 minutes      CODE  Minutes  Units   05908 OT Eval Low     14424 OT Eval Medium [address] High     29358 Fluidotherapy     34579 Manual     95090 Therapeutic Ex     97042 Therapeutic Activity     93645 ADL/COMP Tech Train     74806 Neuromuscular Re-Ed     44663 OrthoManagementTraining     [address] [place] [address]     [id number] Electrical Stim - Attended     [id number] Iontophoresis     [id number] Ultrasound      Other                Electronically signed by: Cristina Hernandez OT/MARK  [id number]      [id number] Certification / Comments      Frequency/Duration 2x / week for 12 visits. Certification period From: 10-28-21  To: 1-28-22     I have reviewed the Plan of Care established for skilled therapy services and certify that the services are required and that they will be provided while the patient is under my care. Physician's Comments/Revisions:           Physicians's Printed Name:  Dr Estill Fruits. Ortolani                                  Physician's Signature:                                                               Date:      Please review Patient's OT evaluation and if you agree sign/date and fax back to us at our [facility] OT Fax: [phone].  Thank you for your referral!

## 2021-10-29 PROBLEM — I63.9 CEREBROVASCULAR ACCIDENT (CVA) (HCC): Status: ACTIVE | Noted: 2021-10-29

## 2021-11-02 ENCOUNTER — TREATMENT (OUTPATIENT)
Dept: OCCUPATIONAL THERAPY | Age: 51
End: 2021-11-02
Payer: COMMERCIAL

## 2021-11-02 DIAGNOSIS — I63.9 CEREBROVASCULAR ACCIDENT (CVA), UNSPECIFIED MECHANISM (HCC): Primary | ICD-10-CM

## 2021-11-02 PROCEDURE — 97018 PARAFFIN BATH THERAPY: CPT | Performed by: OCCUPATIONAL THERAPIST

## 2021-11-02 PROCEDURE — 97110 THERAPEUTIC EXERCISES: CPT | Performed by: OCCUPATIONAL THERAPIST

## 2021-11-02 PROCEDURE — 97530 THERAPEUTIC ACTIVITIES: CPT | Performed by: OCCUPATIONAL THERAPIST

## 2021-11-02 NOTE — PROGRESS NOTES
[facility] OCCUPATIONAL THERAPY   [address]  [facility] [address]  Dept: [phone] OT Fax: [phone]    OCCUPATIONAL THERAPY PROGRESS NOTE    Date:  2021  Initial Evaluation Date: 10-28-21    Patient Name:  Anibal Rose    :  1970    Restrictions/Precautions:  No lifting over 5#, Low fall risk, Hx anemia and RA  Diagnosis:  CVA  Unspecified (163.9)                                                            Date of Surgery/Injury: 11     Insurance/Certification information:  [address] of care signed (Y/N): N  Visit# / total visits: Vinay Upue / 12 recommended     Referring Practitioner:  Dr Eliana Ratliff  Specific Practitioner Orders: OT     Assessment of current deficits   []? Functional mobility             [x]? ADLs          [x]? Strength                  []? Cognition   []? Functional transfers           [x]? IADLs         []? Safety Awareness  [x]? Endurance   [x]? Fine Motor Coordination    []? Balance      [x]? Vision/perception   []? Sensation     [x]? Gross Motor Coordination []? ROM           []? Pain                        []? Edema          []? Scar Adhesion/Skin Integrity      OT PLAN OF CARE   OT POC based on physician orders, patient diagnosis and results of clinical assessment     Frequency/Duration: 2x/ week x 6 weeks  Specific OT Treatment to include:      [x]? Instruction in HEP                   Modalities:  [x]?  Therapeutic Exercise                 []? Ultrasound               []? Electrical Stimulation/Attended  []? PROM/Stretching                    []? Fluidotherapy          []?  Paraffin                   []? AAROM  [x]?  AROM                 []? Iontophoresis:   []? Tendon Dotty Barley  []? Neuromuscular Re-Ed            []? ADL/IADL re-training    [x]?  Therapeutic Activity                  []? Pain Management with/without modalities PRN                 []? Manual Therapy   []? Splinting                                   []? Scar Management                   []?Joint Protection/Training  []? Ergonomics                             []? Joint Mobilization                      []? Adaptive Equipment Assessment/Training                             []? Manual Edema Mobilization   []? Myofascial Release                 [x]? Energy Conservation/Work Simplification  [x]? GM/FM Coordination                []? Safety retraining/education per  individual diagnosis/goals  []? Desensitization        Patient Specific Goal: \" To get my hand back to normal\".                            [id number] (Long term same as Short term):  1) Patient will demonstrate good understanding of home program (exercises/activities/diagnosis/prognosis/goals) with good accuracy. 2) Patient will demonstrate increased L UE strength to 4+/5  for ADL/IADL completion. 3) Patient will demonstrate increased /pinch strength of at least 10  of their left hand. 4) Increase in fine motor function as evidenced by decreased time to complete 9-hole peg test and/or MRMT test by at least 5-10 seconds. 5) Patient will report ADL functions as I with improved functional use of the left arm. 6) Patient will demonstrate improved functional activity tolerance from fair to good for ADL/IADL completion. 7) Patient will decrease QuickDASH score to 15% or less for increased participation in daily functional activities.        Pain Level: Moderate intermittent headache/ pt was encouraged to call the doctor  Subjective: \" I need to get better. Going back to work will be hard. \"     Objective:  Updated POC to be completed by 11-28-21.     INTERVENTION: COMPLETED: SPECIFICS/COMMENTS:   Modality:     Paraffin Tx x 10 min to help minimize hand pain and tightness from arthritis        Strengthening     Over shoulder ROM ex x - ball up the wall roll 15x                  Coordination ex     In hand manipulation X  x - exercise balls in hand and on table/ challenging  -coin in hand manipulation   FM tasks x - small / medium screw assembly                  Other:     Endurance X  - -Fair/ frequent brief rests needed  - Energy conservation information provided/ basics discussed- will review in more detail at next visit          Assessment/Comments: Pt is making Fair + progress toward stated plan of care. Exercises completed with a for on ROM, FM skills and increasing endurance. Pt is motivated for recovery but is easily fatigued. The importance of resting discussed. -Rehab Potential: Good  -Requires OT Follow Up: Yes  Time In:1345            Time Out: 1440             Visit #: 2    Treatment Charges: Mins Units   Modalities: paraffin 10 1   Ther Exercise 15 1   Manual Therapy     Thera Activities 30 2   ADL/Home Mgt      Neuro Re-education     Group Therapy     Non-Billable Service Time     Other     Total Time/Units 55 4       -Response to Treatment: Pt is tried at Tx end. Goals: Goals for pt can be see on initial eval occurring on 10-28-21    Plan:   [x]  Continue Plan of care: Discuss energy conservation at next visit. Pt education continues at each visit to obtain maximum benefits from skilled OT intervention.   []  Alter Plan of care:   []  Discharge:      Reynaldo Ac OT /MARK  [id number]

## 2021-11-05 ENCOUNTER — EVALUATION (OUTPATIENT)
Dept: PHYSICAL THERAPY | Age: 51
End: 2021-11-05
Payer: COMMERCIAL

## 2021-11-05 ENCOUNTER — TREATMENT (OUTPATIENT)
Dept: OCCUPATIONAL THERAPY | Age: 51
End: 2021-11-05
Payer: COMMERCIAL

## 2021-11-05 DIAGNOSIS — I63.9 CEREBROVASCULAR ACCIDENT (CVA), UNSPECIFIED MECHANISM (HCC): Primary | ICD-10-CM

## 2021-11-05 PROCEDURE — 97018 PARAFFIN BATH THERAPY: CPT | Performed by: OCCUPATIONAL THERAPIST

## 2021-11-05 PROCEDURE — 97163 PT EVAL HIGH COMPLEX 45 MIN: CPT | Performed by: PHYSICAL THERAPIST

## 2021-11-05 PROCEDURE — 97530 THERAPEUTIC ACTIVITIES: CPT | Performed by: OCCUPATIONAL THERAPIST

## 2021-11-05 NOTE — PROGRESS NOTES
[facility] [address] [facility] [address]  Dept: [phone] OT Fax: [phone]    OCCUPATIONAL THERAPY PROGRESS NOTE    Date:  2021  Initial Evaluation Date: 10-28-21    Patient Name:  Scot Jacome    :  1970    Restrictions/Precautions:  No lifting over 10#, Low fall risk, Hx anemia and RA  Diagnosis:  CVA  Unspecified (163.9)                                                            Date of Surgery/Injury:      Insurance/Certification information:  [address] of care signed (Y/N): N  Visit# / total visits:  recommended     Referring Practitioner:  Dr Cas Croft  Specific Practitioner Orders: OT     Assessment of current deficits   []? Functional mobility             [x]? ADLs          [x]? Strength                  []? Cognition   []? Functional transfers           [x]? IADLs         []? Safety Awareness  [x]? Endurance   [x]? Fine Motor Coordination    []? Balance      [x]? Vision/perception   []? Sensation     [x]? Gross Motor Coordination []? ROM           []? Pain                        []? Edema          []? Scar Adhesion/Skin Integrity      OT PLAN OF CARE   OT POC based on physician orders, patient diagnosis and results of clinical assessment     Frequency/Duration: 2x/ week x 6 weeks  Specific OT Treatment to include:      [x]? Instruction in HEP                   Modalities:  [x]?  Therapeutic Exercise                 []? Ultrasound               []? Electrical Stimulation/Attended  []? PROM/Stretching                    []? Fluidotherapy          []?  Paraffin                   []? AAROM  [x]?  AROM                 []? Iontophoresis:   []? Tendon Kathlen Cocks  []? Neuromuscular Re-Ed            []? ADL/IADL re-training    [x]?  Therapeutic Activity                  []? Pain Management with/without modalities PRN                 []? Manual Therapy   []? Splinting                                   []? Scar Management                   []?Joint Protection/Training  []? Ergonomics                             []? Joint Mobilization                      []? Adaptive Equipment Assessment/Training                             []? Manual Edema Mobilization   []? Myofascial Release                 [x]? Energy Conservation/Work Simplification  [x]? GM/FM Coordination                []? Safety retraining/education per  individual diagnosis/goals  []? Desensitization        Patient Specific Goal: \" To get my hand back to normal\".                            JMPCS (Long term same as Short term):  1) Patient will demonstrate good understanding of home program (exercises/activities/diagnosis/prognosis/goals) with good accuracy. 2) Patient will demonstrate increased L UE strength to 4+/5  for ADL/IADL completion. 3) Patient will demonstrate increased /pinch strength of at least 10  of their left hand. 4) Increase in fine motor function as evidenced by decreased time to complete 9-hole peg test and/or MRMT test by at least 5-10 seconds. 5) Patient will report ADL functions as I with improved functional use of the left arm. 6) Patient will demonstrate improved functional activity tolerance from fair to good for ADL/IADL completion. 7) Patient will decrease QuickDASH score to 15% or less for increased participation in daily functional activities.        Pain Level: No headache today  Subjective: \" I hurt all over today. It is hard not being on the Cellebrex\". Objective:  Updated POC to be completed by 11-28-21.     INTERVENTION: COMPLETED: SPECIFICS/COMMENTS:   Modality:     Paraffin Tx x 10 min to help minimize hand pain and tightness from arthritis        Strengthening     Over shoulder ROM ex  - ball up the wall roll 15x   Hand strengthening x -3# red digiflex 2 sets composite 2-10, each digit 2-5 each finger             Coordination ex     In hand manipulation X  x - exercise balls in hand and on table/ challenging  -coin in hand manipulation   FM tasks  - small / medium screw assembly   Dexterity ex x -ASL ROM ex             Other:     Endurance X  - -Fair/ frequent brief rests needed  - Energy conservation information provided/ basics discussed- will review in more detail at next visit          Assessment/Comments: Pt is making Fair + progress toward stated plan of care. Exercises completed with a for on hand strength and  FM skills. Less fatigue is noted today with Tx. Pt is for PT eval today. Will continue.     -Rehab Potential: Good  -Requires OT Follow Up: Yes  Time [id number]            Time Out: 0920            Visit #: 2    Treatment Charges: Mins Units   Modalities: paraffin 10 1   Ther Exercise 5 0   Manual Therapy     Thera Activities 20 1   ADL/Home Mgt      Neuro Re-education     Group Therapy     Non-Billable Service Time     Other     Total Time/Units 35 2       -Response to Treatment: Good session. Goals: Goals for pt can be see on initial eval occurring on 10-28-21    Plan:   [x]  Continue Plan of care: Discuss energy conservation at next visit due to running out of time today. Pt education continues at each visit to obtain maximum benefits from skilled OT intervention.   []  Alter Plan of care:   []  Discharge:      Kimberly Lau OT /L  [id number]

## 2021-11-05 NOTE — PROGRESS NOTES
Physical Therapy Daily Treatment Note    Date: 2021  Patient Name: Raissa Mckee  : 1970   MRN: [id number]  DOInjury: 10/03/2021  DOSx: --   Referring Provider: Ramonita Beard  [address] [place] [address],  [address] [place] [address]     Medical Diagnosis:      Diagnosis Orders   1. Cerebrovascular accident (CVA), unspecified mechanism ([facility] [address]         Outcome Measure:   LEFS 11%    X = TO BE PERFORMED NEXT VISIT  > = PROGRESS TO THIS    S: See eval  O:    Time 8554-8299     Visit 1/3 Repeat outcome measure at mid point and end. Pain Pain 0/10     Exercise      ROWS: H X  TA   ROWS: M X  TA   ROWS: L X  TA   ER X  TE   IR X  TE   Flexion in scapular plane X                       A:  Tolerated well. Discussed increasing walking bouts and / or duration. P: See 1-2 times to establish a rotator cuff strengthening program; then will discharge from PT.   Ino Marquez PT    Treatment Charges: Mins Units   Initial Evaluation 40 1   Re-Evaluation     Ther Exercise         TE     Manual Therapy     MT     Ther Activities        TA     Gait Training          GT     Neuro Re-education NR     Modalities     Non-Billable Service Time     Other     Total Time/Units 40 1

## 2021-11-05 NOTE — PROGRESS NOTES
[facility] OUTPATIENT REHABILITATION  PHYSICAL THERAPY INITIAL EVALUATION         Date:  2021   Patient: Jacqueline Roberto  : 1970  MRN: [id number]  Referring Provider: Jeny Mckeon [address],  [address] [place] [address]     Medical Diagnosis:      Diagnosis Orders   1. Cerebrovascular accident (CVA), unspecified mechanism [facility])          Physician Order: Eval and Treat     SUBJECTIVE:     History: Patient reports decreased functional mobility since 10/03/2021 when she developed L-sided weakness, facial numbness, slurred speech. She was admitted to [facility] for about 6 days. She reports about a 50% improvement since onset. She reports her speech is much better as is arm and leg use / control. Patient is seeing OT and is working on getting a Speech Therapy prescription     Chief complaint: L UE weakness, L hand weakness, word finding. Denies LE weakness or loss of balance. Reports decreased endurance. Behavior: condition is getting better    Pain: intermittent  Current: 8/10         Symptom Type / Quality: headache - last all day when they occur. 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     Location[de-identified] R occiput / back of head    Past Medical History:  Past Medical History:   Diagnosis Date    Arthritis     rheumatoid     Cerebral artery occlusion with cerebral infarction [facility])      Past Surgical History:   Procedure Laterality Date    CARPAL TUNNEL RELEASE Left 2021    Left Carpal Tunnel Release    CARPAL TUNNEL RELEASE Left 2021    LEFT CARPAL TUNNEL RELEASE performed by Jordy Santiago DO at [facility], [facility]         Medications:   Current Outpatient Medications   Medication Sig Dispense Refill    folic acid (FOLVITE) 792 MCG tablet Take 800 mcg by mouth daily      Multiple Vitamins-Minerals (MULTIVITAMIN ADULT) CHEW Take 1 tablet by mouth daily      Omega-3 Fatty Acids (FISH OIL) 1000 MG CAPS Take 1,000 mg by mouth daily      atorvastatin (LIPITOR) 80 MG tablet Take 80 mg by mouth nightly      celecoxib (CELEBREX) 200 MG capsule Take 200 mg by mouth every 12 hours      ferrous sulfate (IRON 325) 325 (65 Fe) MG tablet Take 325 mg by mouth 2 times daily       medroxyPROGESTERone (PROVERA) 10 MG tablet Take 10 mg by mouth daily      apixaban (ELIQUIS) 5 MG TABS tablet Take 5 mg by mouth 2 times daily      NIACIN PO Take 1 tablet by mouth daily       vitamin B-1 (THIAMINE) 100 MG tablet Take 100 mg by mouth daily      Vitamin D (CHOLECALCIFEROL) 25 MCG (1000 UT) TABS tablet Take 1,000 Units by mouth daily      methotrexate (RHEUMATREX) 2.5 MG chemo tablet Take 20 mg by mouth once a week Friday (8 Tablets)       No current facility-administered medications for this visit. Occupation: Teacher at [employer]. Exercise regimen: walking. She is walking most days (weather dependent) for about 10 minutes. Stops due to fatigue. We discussed multiple bouts of walking vs longer singular bouts. Patient Goals: get back to normal, normal speech, normal strength L arm    Contraindications/Precautions: none    OBJECTIVE:     Estimated body mass index is 35.51 kg/m² as calculated from the following:    Height as of 10/22/21: 5' 6\" (1.676 m). Weight as of 10/22/21: 220 lb (99.8 kg).      Observations: well nourished female, normal affect    Inspection: normal orthopedic exam       Gait: normal, no devices    Range of Motion:    Neck:    Flexion:  [x] Normal   [] Limited    Extension:  [x] Normal   [] Limited     Right Rotation: [x] Normal   [] Limited    Left Rotation:  [x] Normal   [] Limited    Right Side Bending: [x] Normal   [] Limited    Left Side Bending: [x] Normal   [] Limited     Upper Extremity:   Right:   [x] Normal   [] Limited    Left:   [x] Normal   [] Limited     Trunk:   Flexion:  [x] Normal   [] Limited    Extension:  [x] Normal   [] Limited     Right Rotation: [x] Normal   [] Limited    Left Rotation:  [x] Normal   [] Limited    Right Side Bending: [x] Normal   [] Limited    Left Side Bending: [x] Normal   [] Limited     Lower Extremity:   Right:   [x] Normal   [] Limited    Left:   [x] Normal   [] Limited       Strength:     Neck: 5/5   Trunk: 5/5   R UE: 5/5   L UE: Rotator cuff 4/5. Elbow, Hand, Wrist: see OT note. R LE: 5/5   L LE: 5/5    Functional Strength:   Able to toe walk, heel walk, and squat. L single leg squat to fatigue: 20 reps  R single leg squat to fatigue: 16  L single leg calf raise to fatigue: 20  R single leg calf raise to fatigue: 12    Feels strong on left. Notes limitations on R due to RA. Functional Mobility/Tests:  Test    Basic Transfer Normal   Sit/Stand See Test below   Squat Normal   Dysdiadochokinesia Not present    Dysmetria  Not present      TUG Test:  _7_  Seconds. Test date: 11/5/21  Notes: Good gait pattern and speed. An older adult who takes ? 12 seconds to complete the TUG is at high risk for falling. 30-Sec. Chair Stand Test:  Number:  _19_  Test date: 11/5/2021    Notes: Good speed and control. Scoring criteria. Chair Stand--Below Average Scores Age  Men  Women    60-64  < 14  < 12    65-69  < 12  < 11    70-74  < 12  < 10    75-79  < 11  < 10    80-84  < 10  < 9    85-89  < 8  < 8    90-94  < 7  < 4         ASSESSMENT     Claudean Coder has normal strength, balance, and coordination of her lower extremities and trunk. She notes reduced walking distances due to fatigue. We discussed strategies to improve her community ambulation frequency and duration. She mas mild rotator cuff weakness.   The plan is to see Claudean Coder 1-2 times to build a home shoulder strengthening program.       Outcome Measure:   LEFS 11%    Problems:   Strength decreased   Limitations with use of left upper extremity    [x] There are no barriers affecting plan of care or recovery    [] Barriers to this patient's plan of care or recovery include:      Domestic Concerns:  [x] No  [] Yes:    Long Term goals (2 weeks)   Independent with shoulder Home Exercise Programs     Rehab Potential: [x] Good  [] Fair  [] Poor    PLAN       Treatment Plan:  instruction in home exercise program   therapeutic exercise   neuromuscular re-education     The following CPT codes are likely to be used in the care of this patient:   85291 PT Evaluation: High Complexity     Suggested Professional Referral: [x] No  [] Yes:     Patient Education:  [x] Plans / Goals, Risks / Benefits discussed  [x] Home exercise program  Method of Education: [x] Verbal  [x] Demo  [x] Written  Comprehension of Education:  [x] Verbalizes understanding. [x] Demonstrates understanding. [] Needs Review. [] Demonstrates / verbalizes understanding of HEP / [place] previously given. Frequency:  1-2 days per week for 2 weeks     Patient understands diagnosis/prognosis and consents to treatment, plan and goals: [x] Yes    [] No     Thank you for the opportunity to work with your patient. If you have questions or comments, please contact me at [phone]; fax: [phone]. Electronically signed by: Ana Laura Inman PT         Please sign Physician's Certification and return to: [address] [place] [address]  Dept: [phone]  Dept Fax: [phone] Certification / Comments     Frequency/Duration 1-2 days per week for 2 weeks . Certification period from 11/5/2021  to 02/05/2022. I have reviewed the Plan of Care established for skilled therapy services and certify that the services are required and that they will be provided while the patient is under my care.     Physician's Comments/Revisions:               Physician's Printed Name:                                           [de-identified] Signature:                                                               Date:

## 2021-11-10 ENCOUNTER — TREATMENT (OUTPATIENT)
Dept: OCCUPATIONAL THERAPY | Age: 51
End: 2021-11-10
Payer: COMMERCIAL

## 2021-11-10 ENCOUNTER — TREATMENT (OUTPATIENT)
Dept: PHYSICAL THERAPY | Age: 51
End: 2021-11-10

## 2021-11-10 DIAGNOSIS — I63.9 CEREBROVASCULAR ACCIDENT (CVA), UNSPECIFIED MECHANISM (HCC): Primary | ICD-10-CM

## 2021-11-10 PROCEDURE — 97530 THERAPEUTIC ACTIVITIES: CPT | Performed by: OCCUPATIONAL THERAPIST

## 2021-11-10 PROCEDURE — 97110 THERAPEUTIC EXERCISES: CPT | Performed by: OCCUPATIONAL THERAPIST

## 2021-11-10 NOTE — PROGRESS NOTES
Physical Therapy Daily Treatment Note    Date: 11/10/2021  Patient Name: Fang Norman  : 1970   MRN: [id number]  DOInjury: 10/03/2021  DOSx: --   Referring Provider: Nellie Flores  [address],  [address] [place] [address]      Medical Diagnosis:      Diagnosis Orders   1. Cerebrovascular accident (CVA), unspecified mechanism ([facility] [address]         Outcome Measure:   LEFS 11%    Access Code: [email or web address]  URL: [email or web address]  Date: 11/10/2021  Prepared by: Mike Hein    Exercises  Staggered Stance Single Arm Row with Anchored Resistance - 2-3 x weekly - 3 sets - 15 reps  Split Stance Shoulder Row with Resistance - 2-3 x weekly - 3 sets - 15 reps  Standing Floor Level Row - 2-3 x weekly - 3 sets - 10-15 reps  Shoulder External Rotation with Anchored Resistance - 2-3 x weekly - 3 sets - 10-15 reps  Shoulder Internal Rotation with Resistance - 2-3 x weekly - 3 sets - 10-15 reps    X = TO BE PERFORMED NEXT VISIT  > = PROGRESS TO THIS    S: No new report   O:    Time 5441-9902     Visit 2/3 Repeat outcome measure at mid point and end. Pain Pain 0/10     Exercise      ROWS: H Blue 3 x 12-15  TA   ROWS: M Blue 3 x 12-15  TA   ROWS: L Blue 3 x 12-15  TA   ER Blue 3 x 12-15  TE   IR Blue 3 x 12-15  TE   Flexion in scapular plane Next                        A:  Tolerated well. P: See 1 more time in 2 weeks.     Mike Hein, PT    Treatment Charges: Mins Units   Initial Evaluation     Re-Evaluation     Ther Exercise         TE 30 2   Manual Therapy     MT     Ther Activities        TA     Gait Training          GT     Neuro Re-education NR     Modalities     Non-Billable Service Time     Other     Total Time/Units 30 2

## 2021-11-10 NOTE — PROGRESS NOTES
[facility] [address] [facility] [address]  Dept: [phone] OT Fax: [phone]    OCCUPATIONAL THERAPY PROGRESS NOTE    Date:  11/10/2021  Initial Evaluation Date: 10-28-21    Patient Name:  Lesa Regalado    :  1970    Restrictions/Precautions:  No lifting over 10#, Low fall risk, Hx anemia and RA  Diagnosis:  CVA  Unspecified (163.9)                                                            Date of Surgery/Injury:      Insurance/Certification information:  [address] of care signed (Y/N): N  Visit# / total visits: 3 / 12 recommended     Referring Practitioner:  Dr Nate De La Cruz  Specific Practitioner Orders: OT     Assessment of current deficits   []? Functional mobility             [x]? ADLs          [x]? Strength                  []? Cognition   []? Functional transfers           [x]? IADLs         []? Safety Awareness  [x]? Endurance   [x]? Fine Motor Coordination    []? Balance      [x]? Vision/perception   []? Sensation     [x]? Gross Motor Coordination []? ROM           []? Pain                        []? Edema          []? Scar Adhesion/Skin Integrity      OT PLAN OF CARE   OT POC based on physician orders, patient diagnosis and results of clinical assessment     Frequency/Duration: 2x/ week x 6 weeks  Specific OT Treatment to include:      [x]? Instruction in HEP                   Modalities:  [x]?  Therapeutic Exercise                 []? Ultrasound               []? Electrical Stimulation/Attended  []? PROM/Stretching                    []? Fluidotherapy          []?  Paraffin                   []? AAROM  [x]?  AROM                 []? Iontophoresis:   []? Tendon Analy Laz  []? Neuromuscular Re-Ed            []? ADL/IADL re-training    [x]?  Therapeutic Activity                  []? Pain Management with/without modalities PRN                 []? Manual Therapy   []? Splinting                                   []? Scar Management                   []?Joint Protection/Training  []? Ergonomics                             []? Joint Mobilization                      []? Adaptive Equipment Assessment/Training                             []? Manual Edema Mobilization   []? Myofascial Release                 [x]? Energy Conservation/Work Simplification  [x]? GM/FM Coordination                []? Safety retraining/education per  individual diagnosis/goals  []? Desensitization        Patient Specific Goal: \" To get my hand back to normal\".                            AATMY (Long term same as Short term):  1) Patient will demonstrate good understanding of home program (exercises/activities/diagnosis/prognosis/goals) with good accuracy. 2) Patient will demonstrate increased L UE strength to 4+/5  for ADL/IADL completion. 3) Patient will demonstrate increased /pinch strength of at least 10  of their left hand. 4) Increase in fine motor function as evidenced by decreased time to complete 9-hole peg test and/or MRMT test by at least 5-10 seconds. 5) Patient will report ADL functions as I with improved functional use of the left arm. 6) Patient will demonstrate improved functional activity tolerance from fair to good for ADL/IADL completion. 7) Patient will decrease QuickDASH score to 15% or less for increased participation in daily functional activities.        Pain Level: No headache today  Subjective: \" I am tired and my joints ache without the Cellebrex\". Objective:  Updated POC to be completed by 11-28-21.     INTERVENTION: COMPLETED: SPECIFICS/COMMENTS:   Modality:     Paraffin Tx x 10 min to help minimize hand pain and tightness from arthritis        Strengthening     Over shoulder ROM/ full arm  ex X    x - ball up the wall roll 15x  - ball to wall circles  UBE 2 min forward/ 2 back   Hand strengthening x -3# red digiflex composite 2-20, each digit 2-10 each finger   Forearm/ wrist strengthening x - red azul bar ( 15x twist in/ twist out, bending palms down, bending palms up)        Coordination ex     In hand manipulation X  x - exercise balls in hand and on table/ challenging  -coin in hand manipulation/ 5 coins at a time   FM tasks   x - small / medium screw assembly  - [employer]ue peg board assembly   Dexterity ex  -ASL ROM ex/ continue at home             Other:     Endurance X   -Fair+/ less brief rests needed/ minimal SOB  - Energy conservation information provided/ basics discussed- will review in more detail at next visit          Assessment/Comments: Pt is making Fair + progress toward stated plan of care. UE strengthening and coordination ex continued. Less SOB noted but fatigue continues. -Rehab Potential: Good  -Requires OT Follow Up: Yes  Time In: 1400           Time Out: 1455           Visit #: 3    Treatment Charges: Mins Units   Modalities: paraffin     Ther Exercise 30 2   Manual Therapy     Thera Activities 25 2   ADL/Home Mgt      Neuro Re-education     Group Therapy     Non-Billable Service Time     Other     Total Time/Units 55 4       -Response to Treatment: Good session. Pt is anxious to get better. Goals: Goals for pt can be seen on initial eval occurring on 10-28-21    Plan:   [x]  Continue Plan of care: Discuss energy conservation . Continue strengthening and coordination ex. Pt education continues at each visit to obtain maximum benefits from skilled OT intervention.   []  Alter Plan of care:   []  Discharge:      Shirley Cerda OT /L  [id number]

## 2021-11-11 ENCOUNTER — TREATMENT (OUTPATIENT)
Dept: OCCUPATIONAL THERAPY | Age: 51
End: 2021-11-11
Payer: COMMERCIAL

## 2021-11-11 DIAGNOSIS — I63.9 CEREBROVASCULAR ACCIDENT (CVA), UNSPECIFIED MECHANISM (HCC): Primary | ICD-10-CM

## 2021-11-11 PROCEDURE — 97530 THERAPEUTIC ACTIVITIES: CPT | Performed by: OCCUPATIONAL THERAPIST

## 2021-11-11 NOTE — PROGRESS NOTES
[address] [facility] [address]  Dept: [address] OT Fax: [phone]    OCCUPATIONAL THERAPY PROGRESS NOTE    Date:  2021  Initial Evaluation Date: 10-28-21    Patient Name:  Kg Akins    :  1970    Restrictions/Precautions:  No lifting over 10#, Low fall risk, Hx anemia and RA  Diagnosis:  CVA  Unspecified (163.9)                                                            Date of Surgery/Injury:      Insurance/Certification information:  [address] of care signed (Y/N): N  Visit# / total visits:  recommended     Referring Practitioner:  Dr Walter Walter  Specific Practitioner Orders: OT     Assessment of current deficits   []? Functional mobility             [x]? ADLs          [x]? Strength                  []? Cognition   []? Functional transfers           [x]? IADLs         []? Safety Awareness  [x]? Endurance   [x]? Fine Motor Coordination    []? Balance      [x]? Vision/perception   []? Sensation     [x]? Gross Motor Coordination []? ROM           []? Pain                        []? Edema          []? Scar Adhesion/Skin Integrity      OT PLAN OF CARE   OT POC based on physician orders, patient diagnosis and results of clinical assessment     Frequency/Duration: 2x/ week x 6 weeks  Specific OT Treatment to include:      [x]? Instruction in HEP                   Modalities:  [x]?  Therapeutic Exercise                 []? Ultrasound               []? Electrical Stimulation/Attended  []? PROM/Stretching                    []? Fluidotherapy          []?  Paraffin                   []? AAROM  [x]?  AROM                 []? Iontophoresis:   []? Tendon Marvel Lentz  []? Neuromuscular Re-Ed            []? ADL/IADL re-training    [x]?  Therapeutic Activity                  []? Pain Management with/without modalities PRN                 []? Manual Therapy   []? Splinting                                   []? Scar Management                   []?Joint Protection/Training  []? Ergonomics                             []? Joint Mobilization                      []? Adaptive Equipment Assessment/Training                             []? Manual Edema Mobilization   []? Myofascial Release                 [x]? Energy Conservation/Work Simplification  [x]? GM/FM Coordination                []? Safety retraining/education per  individual diagnosis/goals  []? Desensitization        Patient Specific Goal: \" To get my hand back to normal\".                            KRR[facility] (Long term same as Short term):  1) Patient will demonstrate good understanding of home program (exercises/activities/diagnosis/prognosis/goals) with good accuracy. 2) Patient will demonstrate increased L UE strength to 4+/5  for ADL/IADL completion. 3) Patient will demonstrate increased /pinch strength of at least 10  of their left hand. 4) Increase in fine motor function as evidenced by decreased time to complete 9-hole peg test and/or MRMT test by at least 5-10 seconds. 5) Patient will report ADL functions as I with improved functional use of the left arm. 6) Patient will demonstrate improved functional activity tolerance from fair to good for ADL/IADL completion. 7) Patient will decrease QuickDASH score to 15% or less for increased participation in daily functional activities.        Pain Level: No headache today  Subjective: \" I feel awful today. I keep losing blood. I am getting dizzy and have trouble catching my breath today. \"   Objective:  Updated POC to be completed by 11-28-21.     INTERVENTION: COMPLETED: SPECIFICS/COMMENTS:   Modality:     Paraffin Tx x 10 min to help minimize hand pain and tightness from arthritis        Strengthening     Over shoulder ROM/ full arm  ex      - ball up the wall roll 15x  - ball to wall circles  UBE 2 min forward/ 2 back   Hand strengthening  -3# red digiflex composite 2-20, each digit 2-10 each finger   Forearm/ wrist strengthening  - red azul bar ( 15x twist in/ twist out, bending palms down, bending palms up)        Coordination ex     In hand manipulation X  x - exercise balls in hand and on table/ challenging  -coin in hand manipulation/ 5 coins at a time   FM tasks x  x - small / medium screw assembly  - Purdue peg board assembly   Dexterity ex  -ASL ROM ex/ continue at home             Other:     Endurance X   -Poor today  - Energy conservation information provided/ basics discussed- will review in more detail at next visit          Assessment/Comments: Pt is making Fair + progress toward stated plan of care. Pt is not feeling well today. Tx was restricted to light, seated, coordination tasks with frequent rests. Pt states she is going to her doctor. Pt was cautioned to avoid coming to therapy next week if her condition remains decreased. Rests are encouraged.     -Rehab Potential: Good  -Requires OT Follow Up: Yes  Time In: 1300           Time Out: 1345           Visit #: 4    Treatment Charges: Mins Units   Modalities: paraffin     Ther Exercise     Manual Therapy     Thera Activities 45 3   ADL/Home Mgt      Neuro Re-education     Group Therapy     Non-Billable Service Time     Other     Total Time/Units 45 3       -Response to Treatment: Pt is very frustrated and is emotionally upset by her gynecological issues and CVA issues. Support provided. Goals: Goals for pt can be seen on initial eval occurring on 10-28-21    Plan:   [x]  Continue Plan of care:  Pt education continues at each visit to obtain maximum benefits from skilled OT intervention.   []  Alter Plan of care:   []  Discharge:      Leah Vargas OT /MARK  [id number] Paramedian Forehead Flap Text: A decision was made to reconstruct the defect utilizing an interpolation axial flap and a staged reconstruction.  A telfa template was made of the defect.  This telfa template was then used to outline the paramedian forehead pedicle flap.  The donor area for the pedicle flap was then injected with anesthesia.  The flap was excised through the skin and subcutaneous tissue down to the layer of the underlying musculature.  The pedicle flap was carefully excised within this deep plane to maintain its blood supply.  The edges of the donor site were undermined.   The donor site was closed in a primary fashion.  The pedicle was then rotated into position and sutured.  Once the tube was sutured into place, adequate blood supply was confirmed with blanching and refill.  The pedicle was then wrapped with xeroform gauze and dressed appropriately with a telfa and gauze bandage to ensure continued blood supply and protect the attached pedicle.

## 2021-11-14 ENCOUNTER — HOSPITAL ENCOUNTER (OUTPATIENT)
Age: 51
Setting detail: OBSERVATION
Discharge: HOME | End: 2021-11-15
Attending: EMERGENCY MEDICINE | Admitting: OBSTETRICS & GYNECOLOGY
Payer: COMMERCIAL

## 2021-11-14 DIAGNOSIS — R58: ICD-10-CM

## 2021-11-14 DIAGNOSIS — N93.9 VAGINAL BLEEDING: Primary | ICD-10-CM

## 2021-11-14 LAB
ABO/RH: NORMAL
ALBUMIN SERPL-MCNC: 4 G/DL (ref 3.5–5.2)
ALP BLD-CCNC: 93 U/L (ref 35–104)
ALT SERPL-CCNC: 29 U/L (ref 0–32)
ANION GAP SERPL CALCULATED.3IONS-SCNC: 13 MMOL/L (ref 7–16)
ANTIBODY SCREEN: NORMAL
AST SERPL-CCNC: 27 U/L (ref 0–31)
BACTERIA: NORMAL /HPF
BASOPHILS ABSOLUTE: 0.04 E9/L (ref 0–0.2)
BASOPHILS RELATIVE PERCENT: 0.5 % (ref 0–2)
BILIRUB SERPL-MCNC: 0.6 MG/DL (ref 0–1.2)
BILIRUBIN URINE: ABNORMAL
BLOOD BANK DISPENSE STATUS: NORMAL
BLOOD BANK DISPENSE STATUS: NORMAL
BLOOD BANK PRODUCT CODE: NORMAL
BLOOD BANK PRODUCT CODE: NORMAL
BLOOD, URINE: ABNORMAL
BPU ID: NORMAL
BPU ID: NORMAL
BUN BLDV-MCNC: 7 MG/DL (ref 6–20)
CALCIUM SERPL-MCNC: 8.3 MG/DL (ref 8.6–10.2)
CHLORIDE BLD-SCNC: 105 MMOL/L (ref 98–107)
CLARITY: ABNORMAL
CO2: 21 MMOL/L (ref 22–29)
COLOR: ABNORMAL
CREAT SERPL-MCNC: 0.8 MG/DL (ref 0.5–1)
DESCRIPTION BLOOD BANK: NORMAL
DESCRIPTION BLOOD BANK: NORMAL
EOSINOPHILS ABSOLUTE: 0.11 E9/L (ref 0.05–0.5)
EOSINOPHILS RELATIVE PERCENT: 1.5 % (ref 0–6)
EPITHELIAL CELLS, UA: NORMAL /HPF
GFR AFRICAN AMERICAN: >60
GFR NON-AFRICAN AMERICAN: >60 ML/MIN/1.73
GLUCOSE BLD-MCNC: 123 MG/DL (ref 74–99)
GLUCOSE URINE: NEGATIVE MG/DL
HCT VFR BLD CALC: 17.7 % (ref 34–48)
HEMOGLOBIN: 5.4 G/DL (ref 11.5–15.5)
IMMATURE GRANULOCYTES #: 0.03 E9/L
IMMATURE GRANULOCYTES %: 0.4 % (ref 0–5)
KETONES, URINE: ABNORMAL MG/DL
LEUKOCYTE ESTERASE, URINE: ABNORMAL
LIPASE: 24 U/L (ref 13–60)
LYMPHOCYTES ABSOLUTE: 2.16 E9/L (ref 1.5–4)
LYMPHOCYTES RELATIVE PERCENT: 29.1 % (ref 20–42)
MAGNESIUM: 2.1 MG/DL (ref 1.6–2.6)
MCH RBC QN AUTO: 29.5 PG (ref 26–35)
MCHC RBC AUTO-ENTMCNC: 30.5 % (ref 32–34.5)
MCV RBC AUTO: 96.7 FL (ref 80–99.9)
MONOCYTES ABSOLUTE: 0.36 E9/L (ref 0.1–0.95)
MONOCYTES RELATIVE PERCENT: 4.8 % (ref 2–12)
NEUTROPHILS ABSOLUTE: 4.73 E9/L (ref 1.8–7.3)
NEUTROPHILS RELATIVE PERCENT: 63.7 % (ref 43–80)
NITRITE, URINE: NEGATIVE
PDW BLD-RTO: 18.4 FL (ref 11.5–15)
PH UA: 6 (ref 5–9)
PLATELET # BLD: 452 E9/L (ref 130–450)
PMV BLD AUTO: 9 FL (ref 7–12)
POTASSIUM REFLEX MAGNESIUM: 3.4 MMOL/L (ref 3.5–5)
PROTEIN UA: >=300 MG/DL
RBC # BLD: 1.83 E12/L (ref 3.5–5.5)
RBC UA: NORMAL /HPF (ref 0–2)
SODIUM BLD-SCNC: 139 MMOL/L (ref 132–146)
SPECIFIC GRAVITY UA: >=1.03 (ref 1–1.03)
TOTAL PROTEIN: 6.4 G/DL (ref 6.4–8.3)
UROBILINOGEN, URINE: 0.2 E.U./DL
WBC # BLD: 7.4 E9/L (ref 4.5–11.5)
WBC UA: NORMAL /HPF (ref 0–5)

## 2021-11-14 PROCEDURE — 99283 EMERGENCY DEPT VISIT LOW MDM: CPT

## 2021-11-14 PROCEDURE — G0378 HOSPITAL OBSERVATION PER HR: HCPCS

## 2021-11-14 PROCEDURE — 86923 COMPATIBILITY TEST ELECTRIC: CPT

## 2021-11-14 PROCEDURE — 86850 RBC ANTIBODY SCREEN: CPT

## 2021-11-14 PROCEDURE — P9016 RBC LEUKOCYTES REDUCED: HCPCS

## 2021-11-14 PROCEDURE — 83735 ASSAY OF MAGNESIUM: CPT

## 2021-11-14 PROCEDURE — 83690 ASSAY OF LIPASE: CPT

## 2021-11-14 PROCEDURE — 85025 COMPLETE CBC W/AUTO DIFF WBC: CPT

## 2021-11-14 PROCEDURE — 81001 URINALYSIS AUTO W/SCOPE: CPT

## 2021-11-14 PROCEDURE — 36430 TRANSFUSION BLD/BLD COMPNT: CPT

## 2021-11-14 PROCEDURE — 93005 ELECTROCARDIOGRAM TRACING: CPT | Performed by: NURSE PRACTITIONER

## 2021-11-14 PROCEDURE — 6370000000 HC RX 637 (ALT 250 FOR IP): Performed by: OBSTETRICS & GYNECOLOGY

## 2021-11-14 PROCEDURE — 80053 COMPREHEN METABOLIC PANEL: CPT

## 2021-11-14 PROCEDURE — 86901 BLOOD TYPING SEROLOGIC RH(D): CPT

## 2021-11-14 PROCEDURE — 86900 BLOOD TYPING SEROLOGIC ABO: CPT

## 2021-11-14 PROCEDURE — 2580000003 HC RX 258: Performed by: OBSTETRICS & GYNECOLOGY

## 2021-11-14 RX ORDER — GAUZE BANDAGE 2" X 2"
100 BANDAGE TOPICAL DAILY
Status: DISCONTINUED | OUTPATIENT
Start: 2021-11-15 | End: 2021-11-15 | Stop reason: HOSPADM

## 2021-11-14 RX ORDER — VITAMIN B COMPLEX
1000 TABLET ORAL DAILY
Status: DISCONTINUED | OUTPATIENT
Start: 2021-11-15 | End: 2021-11-15 | Stop reason: HOSPADM

## 2021-11-14 RX ORDER — SODIUM CHLORIDE 9 MG/ML
INJECTION, SOLUTION INTRAVENOUS PRN
Status: DISCONTINUED | OUTPATIENT
Start: 2021-11-14 | End: 2021-11-15 | Stop reason: HOSPADM

## 2021-11-14 RX ORDER — SODIUM CHLORIDE 9 MG/ML
INJECTION, SOLUTION INTRAVENOUS CONTINUOUS
Status: DISCONTINUED | OUTPATIENT
Start: 2021-11-14 | End: 2021-11-15 | Stop reason: HOSPADM

## 2021-11-14 RX ORDER — SODIUM CHLORIDE 9 MG/ML
INJECTION, SOLUTION INTRAVENOUS PRN
Status: DISCONTINUED | OUTPATIENT
Start: 2021-11-14 | End: 2021-11-14 | Stop reason: SDUPTHER

## 2021-11-14 RX ORDER — CHLORAL HYDRATE 500 MG
1000 CAPSULE ORAL DAILY
Status: DISCONTINUED | OUTPATIENT
Start: 2021-11-15 | End: 2021-11-14 | Stop reason: CLARIF

## 2021-11-14 RX ORDER — LANOLIN ALCOHOL/MO/W.PET/CERES
800 CREAM (GRAM) TOPICAL DAILY
Status: DISCONTINUED | OUTPATIENT
Start: 2021-11-15 | End: 2021-11-15 | Stop reason: HOSPADM

## 2021-11-14 RX ORDER — MEDROXYPROGESTERONE ACETATE 5 MG/1
10 TABLET ORAL DAILY
Status: DISCONTINUED | OUTPATIENT
Start: 2021-11-15 | End: 2021-11-15 | Stop reason: HOSPADM

## 2021-11-14 RX ORDER — CELECOXIB 100 MG/1
200 CAPSULE ORAL EVERY 12 HOURS
Status: DISCONTINUED | OUTPATIENT
Start: 2021-11-15 | End: 2021-11-15 | Stop reason: HOSPADM

## 2021-11-14 RX ORDER — NIACIN 100 MG
100 TABLET ORAL DAILY
Status: DISCONTINUED | OUTPATIENT
Start: 2021-11-15 | End: 2021-11-15 | Stop reason: HOSPADM

## 2021-11-14 RX ORDER — ATORVASTATIN CALCIUM 40 MG/1
80 TABLET, FILM COATED ORAL NIGHTLY
Status: DISCONTINUED | OUTPATIENT
Start: 2021-11-14 | End: 2021-11-15 | Stop reason: HOSPADM

## 2021-11-14 RX ORDER — M-VIT,TX,IRON,MINS/CALC/FOLIC 27MG-0.4MG
1 TABLET ORAL DAILY
Status: DISCONTINUED | OUTPATIENT
Start: 2021-11-15 | End: 2021-11-15 | Stop reason: HOSPADM

## 2021-11-14 RX ORDER — FERROUS SULFATE 325(65) MG
325 TABLET ORAL 2 TIMES DAILY
Status: DISCONTINUED | OUTPATIENT
Start: 2021-11-14 | End: 2021-11-15 | Stop reason: HOSPADM

## 2021-11-14 RX ADMIN — FERROUS SULFATE TAB 325 MG (65 MG ELEMENTAL FE) 325 MG: 325 (65 FE) TAB at 22:22

## 2021-11-14 RX ADMIN — ATORVASTATIN CALCIUM 80 MG: 40 TABLET, FILM COATED ORAL at 22:22

## 2021-11-14 RX ADMIN — SODIUM CHLORIDE: 9 INJECTION, SOLUTION INTRAVENOUS at 22:22

## 2021-11-14 ASSESSMENT — PAIN SCALES - GENERAL
PAINLEVEL_OUTOF10: 10
PAINLEVEL_OUTOF10: 0

## 2021-11-14 NOTE — ED NOTES
Bed: H4  Expected date:   Expected time:   Means of arrival:   Comments:     Kristina Kiran RN  11/14/21 6585

## 2021-11-14 NOTE — ED TRIAGE NOTES
FIRST PROVIDER CONTACT ASSESSMENT NOTE           Department of Emergency Medicine                 First Provider Note            21  3:59 PM EST    Date of Encounter: No admission date for patient encounter. Patient Name: Angel Duggan  : 1970  MRN: [id number]    Chief Complaint: Vaginal Bleeding      History of Present Illness:   Angel Duggan is a 54 y.o. female who presents to the ED for vaginal bleeding ongoing since October. Seeing Dr. Silverio Rm for this. He advised her that she needs a d&c. Reports today she has felt short of breath and dizzy. Focused Physical Exam:  VS:    ED Triage Vitals [21 1536]   BP Temp Temp Source Pulse Resp SpO2 Height Weight   -- 97.6 °F (36.4 °C) Temporal 111 20 99 % -- --        Physical Ex: Constitutional: Alert and non-toxic. Medical History:  has a past medical history of Arthritis and Cerebral artery occlusion with cerebral infarction ([facility] [address] Surgical History:  has a past surgical history that includes sinus surgery (); Carpal tunnel release (Left, 2021); and Carpal tunnel release (Left, 2021). Social History:  reports that she has never smoked. She has never used smokeless tobacco. She reports current alcohol use. She reports that she does not use drugs. Family History: family history includes Arthritis in her father; Breast Cancer in her paternal aunt; Diabetes in her father; Other in her father and mother.     Allergies: Amoxicillin     Initial Plan of Care: Initiate Treatment-Testing, Proceed toTreatment Area When Bed Available for ED Attending/MLP to Continue Care      ---END OF FIRST PROVIDER CONTACT ASSESSMENT NOTE---  Electronically signed by RACHEL Angeles CNP   DD: 21

## 2021-11-14 NOTE — ED PROVIDER NOTES
[id number] [facility] Name: Rekha Kimball  MRN: [id number]  [place] 1970  Date of evaluation: 11/14/2021      CHIEF COMPLAINT       Chief Complaint   Patient presents with    Vaginal Bleeding        HPI  Rekha Kimball is a 54 y.o. female presents to the emergency room with severe bleeding and anemia. States that today she felt very lightheaded and short of breath. She has been having vaginal bleeding since October, currently going through 1 maxi pad every 2-3 hours, states that she is soaking through the maxipads. He says she is passing clots also states that her bleeding has improved since the initial presentation in October. This is is a recurrent problem she was recently seen in the emergency room in October where her hemoglobin was 6.5. She was given 1 unit of blood and at that time she was her bleeding was lasting for more than 10 days. Patient states that since then patient had a stroke and was put on Eliquis, which did worsen her vaginal bleeding. She was placed on Provera which has improved her bleeding. She follows with [facility] stated they would not do a D&C or any surgeries until she was off Eliquis. Patient has been off the Eliquis for the past 2 days, and she had noticed an improvement of her vaginal bleeding. Except as noted above the remainder of the review of systems was reviewed and negative. Review of Systems   Constitutional: Positive for fatigue. Negative for activity change, appetite change, chills and fever. HENT: Negative for congestion, nosebleeds and tinnitus. Eyes: Negative for visual disturbance. Respiratory: Positive for shortness of breath. Negative for cough, chest tightness and wheezing. Cardiovascular: Negative for chest pain, palpitations and leg swelling. Gastrointestinal: Negative for abdominal pain, blood in stool, constipation, diarrhea, nausea and vomiting.    Endocrine: Negative for polydipsia and polyphagia. Genitourinary: Positive for vaginal bleeding. Negative for dysuria, flank pain, hematuria, vaginal discharge and vaginal pain. Musculoskeletal: Negative for back pain, gait problem, joint swelling and myalgias. Skin: Negative for rash. Allergic/Immunologic: Negative for immunocompromised state. Neurological: Positive for light-headedness. Negative for dizziness, syncope, weakness, numbness and headaches. Hematological: Negative for adenopathy. Psychiatric/Behavioral: Negative for behavioral problems, confusion and hallucinations. Physical Exam  Constitutional:       General: She is not in acute distress. Appearance: Normal appearance. She is normal weight. She is not ill-appearing, toxic-appearing or diaphoretic. HENT:      Head: Normocephalic and atraumatic. Right Ear: External ear normal.      Left Ear: External ear normal.      Nose: Nose normal. No congestion or rhinorrhea. Mouth/Throat:      Mouth: Mucous membranes are moist.      Pharynx: Oropharynx is clear. No oropharyngeal exudate or posterior oropharyngeal erythema. Eyes:      Conjunctiva/sclera: Conjunctivae normal.      Pupils: Pupils are equal, round, and reactive to light. Cardiovascular:      Rate and Rhythm: Normal rate and regular rhythm. Pulses: Normal pulses. Heart sounds: Normal heart sounds. Pulmonary:      Effort: Pulmonary effort is normal.      Breath sounds: Normal breath sounds. Abdominal:      General: Abdomen is flat. Bowel sounds are normal. There is no distension. Palpations: Abdomen is soft. There is no mass. Tenderness: There is no abdominal tenderness. There is no right CVA tenderness, left CVA tenderness or guarding. Hernia: No hernia is present. Genitourinary:     General: Normal vulva. Vagina: No signs of injury. Bleeding present. No vaginal discharge, lesions or prolapsed vaginal walls. Cervix: Cervical bleeding present. No friability, erythema or eversion. Comments: One large clot noted in the vaginal canal with mild active bleeding from cervix. Musculoskeletal:      Cervical back: Normal range of motion. Skin:     General: Skin is warm and dry. Capillary Refill: Capillary refill takes less than 2 seconds. Coloration: Skin is pale. Comments: Pale palms    Neurological:      General: No focal deficit present. Mental Status: She is alert and oriented to person, place, and time. Mental status is at baseline. Psychiatric:         Mood and Affect: Mood normal.         Behavior: Behavior normal.         Thought Content: Thought content normal.          Procedures     MDM    54 y.o. female presents to the emergency room with severe bleeding and anemia. States that today she felt very lightheaded and short of breath. She has been having vaginal bleeding since October, currently going through 1 maxi pad every 2-3 hours, states that she is soaking through the maxipads. While in the ED patient was hemodynamically stable, nontoxic-appearing, in no respiratory distress. Exam remarkable for pale skin and conjunctiva, vaginal exam remarkable for 1 large clot in the vaginal canal with minor bleeding from the cervix. No lesions or lacerations or abnormalities noted in the vaginal canal or cervix. Labs remarkable for an H/H of 5.4/17. 7. EKG remarkable for normal sinus with some T wave inversions on inferior, septal and lateral leads, with changes from previous EKGs likely due to demand given active bleeding. Patient was started on 1 unit transfusion here in the ED. Dr. Iva Ordonez consulted, will admit patient to labor and delivery for further management.   Patient in agreement with plan of admission.       --------------------------------------------- PAST HISTORY ---------------------------------------------  Past Medical History:  has a past medical history of Arthritis and Cerebral artery occlusion with cerebral infarction ([facility] [address] Past Surgical History:  has a past surgical history that includes sinus surgery (2005); Carpal tunnel release (Left, 02/12/2021); and Carpal tunnel release (Left, 2/12/2021). Social History:  reports that she has never smoked. She has never used smokeless tobacco. She reports current alcohol use. She reports that she does not use drugs. Family History: family history includes Arthritis in her father; Breast Cancer in her paternal aunt; Diabetes in her father; Other in her father and mother. The patients home medications have been reviewed.     Allergies: Amoxicillin    -------------------------------------------------- RESULTS -------------------------------------------------    LABS:  Results for orders placed or performed during the hospital encounter of 11/14/21   CBC Auto Differential   Result Value Ref Range    WBC 7.4 4.5 - 11.5 E9/L    RBC 1.83 (L) 3.50 - 5.50 E12/L    Hemoglobin 5.4 (LL) 11.5 - 15.5 g/dL    Hematocrit 17.7 (L) 34.0 - 48.0 %    MCV 96.7 80.0 - 99.9 fL    MCH 29.5 26.0 - 35.0 pg    MCHC 30.5 (L) 32.0 - 34.5 %    RDW 18.4 (H) 11.5 - 15.0 fL    Platelets 313 (H) 934 - 450 E9/L    MPV 9.0 7.0 - 12.0 fL    Neutrophils % 63.7 43.0 - 80.0 %    Immature Granulocytes % 0.4 0.0 - 5.0 %    Lymphocytes % 29.1 20.0 - 42.0 %    Monocytes % 4.8 2.0 - 12.0 %    Eosinophils % 1.5 0.0 - 6.0 %    Basophils % 0.5 0.0 - 2.0 %    Neutrophils Absolute 4.73 1.80 - 7.30 E9/L    Immature Granulocytes # 0.03 E9/L    Lymphocytes Absolute 2.16 1.50 - 4.00 E9/L    Monocytes Absolute 0.36 0.10 - 0.95 E9/L    Eosinophils Absolute 0.11 0.05 - 0.50 E9/L    Basophils Absolute 0.04 0.00 - 0.20 E9/L   Comprehensive Metabolic Panel w/ Reflex to MG   Result Value Ref Range    Sodium 139 132 - 146 mmol/L    Potassium reflex Magnesium 3.4 (L) 3.5 - 5.0 mmol/L    Chloride 105 98 - 107 mmol/L    CO2 21 (L) 22 - 29 mmol/L    Anion Gap 13 7 - 16 mmol/L    Glucose 123 (H) 74 - 99 mg/dL    BUN 7 6 - 20 mg/dL    CREATININE 0.8 0.5 - 1.0 mg/dL    GFR Non-African American >60 >=60 mL/min/1.73    GFR African American >60     Calcium 8.3 (L) 8.6 - 10.2 mg/dL    Total Protein 6.4 6.4 - 8.3 g/dL    Albumin 4.0 3.5 - 5.2 g/dL    Total Bilirubin 0.6 0.0 - 1.2 mg/dL    Alkaline Phosphatase 93 35 - 104 U/L    ALT 29 0 - 32 U/L    AST 27 0 - 31 U/L   Lipase   Result Value Ref Range    Lipase 24 13 - 60 U/L   Urinalysis, reflex to microscopic   Result Value Ref Range    Color, UA RED (A) Straw/Yellow    Clarity, UA TURBID (A) Clear    Glucose, Ur Negative Negative mg/dL    Bilirubin Urine SMALL (A) Negative    Ketones, Urine TRACE (A) Negative mg/dL    Specific Gravity, UA >=1.030 1.005 - 1.030    Blood, Urine LARGE (A) Negative    pH, UA 6.0 5.0 - 9.0    Protein, UA >=300 (A) Negative mg/dL    Urobilinogen, Urine 0.2 <2.0 E.U./dL    Nitrite, Urine Negative Negative    Leukocyte Esterase, Urine SMALL (A) Negative   Microscopic Urinalysis   Result Value Ref Range    WBC, UA 1-3 0 - 5 /HPF    RBC, UA PACKED 0 - 2 /HPF    Epithelial Cells, UA RARE /HPF    Bacteria, UA NONE SEEN None Seen /HPF   Magnesium   Result Value Ref Range    Magnesium 2.1 1.6 - 2.6 mg/dL   EKG 12 Lead   Result Value Ref Range    Ventricular Rate 97 BPM    Atrial Rate 97 BPM    P-R Interval 140 ms    QRS Duration 80 ms    Q-T Interval 346 ms    QTc Calculation (Bazett) 439 ms    P Axis 19 degrees    R Axis 16 degrees    T Axis -28 degrees   TYPE AND SCREEN   Result Value Ref Range    ABO/Rh A POS     Antibody Screen NEG    PREPARE RBC (CROSSMATCH), 1 Units   Result Value Ref Range    Product Code Blood Bank [id number]     Description Blood Bank Red Blood Cells, Leuko-reduced     Unit Number [id number]     Dispense Status Blood Bank transfused    PREPARE RBC (CROSSMATCH), 1 Units   Result Value Ref Range    Product Code Blood Bank [id number]     Description Blood Bank Red Blood Cells, Leuko-reduced     Unit Number [id number]     Dispense Status Blood Bank transfused        RADIOLOGY:  No orders to display       EKG: This EKG is signed and interpreted by me. Rate: 97  Rhythm: Sinus  Interpretation: non-specific EKG  Comparison: changes compared to previous EKG      ------------------------- NURSING NOTES AND VITALS REVIEWED ---------------------------  Date / Time Roomed:  11/14/2021  4:59 PM  ED Bed Assignment:  [id number]    The nursing notes within the ED encounter and vital signs as below have been reviewed. Patient Vitals for the past 24 hrs:   BP Temp Temp src Pulse Resp SpO2 Height Weight   11/14/21 2315 121/60 98.5 °F (36.9 °C) Oral 76 18 99 % -- --   11/14/21 2230 (!) 119/56 98.3 °F (36.8 °C) Oral 77 20 100 % -- --   11/14/21 2015 -- -- -- -- -- -- 5' 6\" (1.676 m) 214 lb (97.1 kg)   11/14/21 2000 117/60 98.7 °F (37.1 °C) Oral 74 20 100 % -- --   11/14/21 1911 -- 99 °F (37.2 °C) Oral 81 18 100 % -- --   11/14/21 1852 -- 99.1 °F (37.3 °C) Oral -- -- -- -- --   11/14/21 1600 129/63 99 °F (37.2 °C) -- 102 18 100 % -- 214 lb (97.1 kg)   11/14/21 1536 -- 97.6 °F (36.4 °C) Temporal 111 20 99 % -- --       Oxygen Saturation Interpretation: Normal    ------------------------------------------ PROGRESS NOTES ------------------------------------------  Re-evaluation(s):  Time: 1700  Patients symptoms show no change  Repeat physical examination is not changed    Counseling:  I have spoken with the patient and discussed todays results, in addition to providing specific details for the plan of care and counseling regarding the diagnosis and prognosis. Their questions are answered at this time and they are agreeable with the plan of admission.    --------------------------------- ADDITIONAL PROVIDER NOTES ---------------------------------  Consultations:  Time: 0282. Dr. Stanly Scheuermann spoke with Dr. Too Liz.  Discussed case. They will admit the patient.   This patient's ED course included: a personal history and physicial examination, re-evaluation prior to disposition, multiple bedside re-evaluations, IV medications, cardiac monitoring and continuous pulse oximetry    This patient has remained hemodynamically stable during their ED course. Diagnosis:  1. Vaginal bleeding    2. Hemorrhage requiring transfusion        Disposition:  Patient's disposition: Admit to L&D  Patient's condition is stable. Tarah Doll MD  Resident  11/15/21 6428    ATTENDING PROVIDER ATTESTATION:     I have personally performed and/or participated in the history, exam, medical decision making, and procedures and agree with all pertinent clinical information unless otherwise noted. I have also reviewed and agree with the past medical, family and social history unless otherwise noted. I have discussed this patient in detail with the resident and provided the instruction and education regarding the evidence-based evaluation and treatment of vaginal bleeding. History: patient is reporting vaginal bleeding, lightheadedness, SOB and fatigue. She has a stroke months ago and was placed on Eliquis. The vaginal bleeding has been an ongoing issue but they felt it was too risky to have take her off the Eliquis. She was advised to discontinue 2 days ago in anticipation of a D&C. She states it has helped to decrease the flow. My findings: Angel Duggan is a 54 y.o. female whom is in no distress. Physical exam reveals pallor, tachycardia, lungs CTA, abdomen is soft and nontender. Pelvic with scant amount of active bleeding from the os. My plan: Symptomatic and supportive care. Patient to be admitted to L&D and transfused. Critical care 30 minutes.     Electronically signed by Vidhya Davies DO on 11/17/21 at 7:07 AM OTTO Davies DO  11/17/21 0538

## 2021-11-15 VITALS
BODY MASS INDEX: 34.39 KG/M2 | SYSTOLIC BLOOD PRESSURE: 126 MMHG | HEART RATE: 79 BPM | OXYGEN SATURATION: 100 % | RESPIRATION RATE: 18 BRPM | HEIGHT: 66 IN | WEIGHT: 214 LBS | DIASTOLIC BLOOD PRESSURE: 57 MMHG | TEMPERATURE: 98.3 F

## 2021-11-15 LAB
EKG ATRIAL RATE: 97 BPM
EKG P AXIS: 19 DEGREES
EKG P-R INTERVAL: 140 MS
EKG Q-T INTERVAL: 346 MS
EKG QRS DURATION: 80 MS
EKG QTC CALCULATION (BAZETT): 439 MS
EKG R AXIS: 16 DEGREES
EKG T AXIS: -28 DEGREES
EKG VENTRICULAR RATE: 97 BPM
HCT VFR BLD CALC: 23.5 % (ref 34–48)
HEMOGLOBIN: 7.2 G/DL (ref 11.5–15.5)

## 2021-11-15 PROCEDURE — 85018 HEMOGLOBIN: CPT

## 2021-11-15 PROCEDURE — 36430 TRANSFUSION BLD/BLD COMPNT: CPT

## 2021-11-15 PROCEDURE — G0378 HOSPITAL OBSERVATION PER HR: HCPCS

## 2021-11-15 PROCEDURE — 85014 HEMATOCRIT: CPT

## 2021-11-15 PROCEDURE — 6370000000 HC RX 637 (ALT 250 FOR IP): Performed by: OBSTETRICS & GYNECOLOGY

## 2021-11-15 PROCEDURE — 2580000003 HC RX 258: Performed by: OBSTETRICS & GYNECOLOGY

## 2021-11-15 PROCEDURE — 36415 COLL VENOUS BLD VENIPUNCTURE: CPT

## 2021-11-15 RX ORDER — MEDROXYPROGESTERONE ACETATE 10 MG/1
10 TABLET ORAL 2 TIMES DAILY
Qty: 60 TABLET | Refills: 0 | Status: ON HOLD | OUTPATIENT
Start: 2021-11-15 | End: 2021-12-02 | Stop reason: HOSPADM

## 2021-11-15 RX ADMIN — MULTIPLE VITAMINS W/ MINERALS TAB 1 TABLET: TAB at 08:27

## 2021-11-15 RX ADMIN — THIAMINE HCL TAB 100 MG 100 MG: 100 TAB at 08:27

## 2021-11-15 RX ADMIN — FERROUS SULFATE TAB 325 MG (65 MG ELEMENTAL FE) 325 MG: 325 (65 FE) TAB at 08:27

## 2021-11-15 RX ADMIN — SODIUM CHLORIDE: 9 INJECTION, SOLUTION INTRAVENOUS at 10:06

## 2021-11-15 RX ADMIN — Medication 800 MCG: at 08:28

## 2021-11-15 RX ADMIN — Medication 1000 UNITS: at 08:27

## 2021-11-15 RX ADMIN — Medication 100 MG: at 08:29

## 2021-11-15 RX ADMIN — MEDROXYPROGESTERONE ACETATE 10 MG: 5 TABLET ORAL at 08:28

## 2021-11-15 ASSESSMENT — PAIN SCALES - GENERAL
PAINLEVEL_OUTOF10: 0
PAINLEVEL_OUTOF10: 0

## 2021-11-15 ASSESSMENT — ENCOUNTER SYMPTOMS
BACK PAIN: 0
NAUSEA: 0
CONSTIPATION: 0
VOMITING: 0
ABDOMINAL PAIN: 0
CHEST TIGHTNESS: 0
DIARRHEA: 0
COUGH: 0
WHEEZING: 0
BLOOD IN STOOL: 0
SHORTNESS OF BREATH: 1

## 2021-11-15 NOTE — H&P
Department of Gynecology  H&P Note          CHIEF COMPLAINT:   Severe vaginal bleeding with secondary anemia    History obtained from patient, electronic medical record    HISTORY OF PRESENT ILLNESS:     The patient is a 54 y.o. female with significant past medical history of menometrorrhagia with anemia currently on ferrous sulfate therapy who presented to ER with dizziness and lightheadedness. Evaluation emergency room confirmed the presence of severe anemia with a hemoglobin of 5.4 and hematocrit of 17.7. Patient was admitted for blood transfusion and further monitoring. Patient has history of severe vaginal bleeding for which she is taking Provera 10 mg p.o. twice daily to slow down the bleeding. She was also on Eliquis till 2 days ago due to history of recent stroke. Patient was seen by her neurologist 2 days ago and was allowed to discontinue Eliquis. Past Medical History:        Diagnosis Date    Arthritis     rheumatoid     Cerebral artery occlusion with cerebral infarction [facility])      Past Surgical History:        Procedure Laterality Date    CARPAL TUNNEL RELEASE Left 02/12/2021    Left Carpal Tunnel Release    CARPAL TUNNEL RELEASE Left 2/12/2021    LEFT CARPAL TUNNEL RELEASE performed by Jillian Bergeron DO at [facility], [facility]  2005       Past Gynecological History:    1. Last menstrual period: 2 weeks ago  2. Menses: interval:  3 weeks  duration:  9 day(s)  3. Contraception: None  4. Sexually transmitted disease history: none        A.  Number of sexual partners in the last 6 months: 1    meds:  Current Facility-Administered Medications:     0.9 % sodium chloride infusion, , IntraVENous, PRN, Elías Hodges MD    0.9 % sodium chloride infusion, , IntraVENous, Continuous, Elías Hodges MD, Last Rate: 125 mL/hr at 11/15/21 1006, New Bag at 11/15/21 1006    atorvastatin (LIPITOR) tablet 80 mg, 80 mg, Oral, Nightly, Elías Hodges MD, 80 mg at 11/14/21 9301   celecoxib (CELEBREX) capsule 200 mg, 200 mg, Oral, Q12H, Elías Hodges MD    ferrous sulfate (IRON 325) tablet 325 mg, 325 mg, Oral, BID, Elías Hodges MD, 325 mg at 46/11/28 1751    folic acid (FOLVITE) tablet 800 mcg, 800 mcg, Oral, Daily, Elías Hodges MD, 800 mcg at 11/15/21 0828    medroxyPROGESTERone (PROVERA) tablet 10 mg, 10 mg, Oral, Daily, Elías Hodges MD, 10 mg at 11/15/21 5098    therapeutic multivitamin-minerals 1 tablet, 1 tablet, Oral, Daily, Elías Hodges MD, 1 tablet at 11/15/21 0827    niacin tablet 100 mg, 100 mg, Oral, Daily, Elías Hodges MD, 100 mg at 11/15/21 4041    thiamine mononitrate tablet 100 mg, 100 mg, Oral, Daily, Elías Hodges MD, 100 mg at 11/15/21 0827    Vitamin D (CHOLECALCIFEROL) tablet 1,000 Units, 1,000 Units, Oral, Daily, Elías Hodges MD, 1,000 Units at 11/15/21 0827    [START ON 11/19/2021] methotrexate (RHEUMATREX) chemo tablet 20 mg, 20 mg, Oral, Weekly, Elías Hodges MD       Allergies:  Amoxicillin     Social History:  TOBACCO:   reports that she has never smoked. She has never used smokeless tobacco.  ETOH:   reports current alcohol use. DRUGS:   reports no history of drug use.     Family History:       Problem Relation Age of Onset    Other Mother     Arthritis Father     Diabetes Father     Other Father     Breast Cancer Paternal Aunt        Review of Systems  Review of Systems -  General ROS: negative for - chills, fatigue or malaise  ENT ROS: negative for - hearing change, nasal congestion or nasal discharge  Allergy and Immunology ROS: negative for - hives, itchy/watery eyes or nasal congestion  Hematological and Lymphatic ROS: negative for - blood clots, blood transfusions, bruising or fatigue  Endocrine ROS: negative for - malaise/lethargy, mood swings, palpitations or polydipsia/polyuria  Breast ROS: negative for - new or changing breast lumps or nipple changes  Respiratory ROS: negative for - sputum

## 2021-11-15 NOTE — PROGRESS NOTES
Doing much better after the blood transfusion. Bleeding slowed down significantly    Blood pressure (!) 126/57, pulse 79, temperature 98.3 °F (36.8 °C), temperature source Oral, resp. rate 18, height 5' 6\" (1.676 m), weight 214 lb (97.1 kg), SpO2 100 %, not currently breastfeeding. Repeat H&H: 7.3 and 23.2    Assessment  Menometrorrhagia with severe secondary anemia, status post blood transfusion    Plan  We will continue with p.o. Provera as prescribed and will proceed with a D&C as soon as the bleeding stops completely. Discharge home. Patient will call the office when the bleeding completely stops.

## 2021-11-15 NOTE — CARE COORDINATION
11/15/2021 1404 CM note: No covid testing. Met with patient for transition of care needs. Pt resides alone. She relays she is fairly independent with ADLs but has not driven since her recent stroke. Her boyfriend and her father assist her as needed and provide transportation. No hx DME. She attends outpt PT/OT at [facility]. Pt plans to return home at discharge.  Nakia SOMMERS

## 2021-11-16 ENCOUNTER — TREATMENT (OUTPATIENT)
Dept: OCCUPATIONAL THERAPY | Age: 51
End: 2021-11-16
Payer: COMMERCIAL

## 2021-11-16 ENCOUNTER — ANESTHESIA EVENT (OUTPATIENT)
Dept: OPERATING ROOM | Age: 51
End: 2021-11-16
Payer: COMMERCIAL

## 2021-11-16 ENCOUNTER — HOSPITAL ENCOUNTER (OUTPATIENT)
Dept: PREADMISSION TESTING | Age: 51
Discharge: HOME | End: 2021-11-16
Payer: COMMERCIAL

## 2021-11-16 VITALS
RESPIRATION RATE: 18 BRPM | BODY MASS INDEX: 34.31 KG/M2 | TEMPERATURE: 97.5 F | HEART RATE: 72 BPM | WEIGHT: 213.5 LBS | HEIGHT: 66 IN | OXYGEN SATURATION: 99 % | DIASTOLIC BLOOD PRESSURE: 56 MMHG | SYSTOLIC BLOOD PRESSURE: 114 MMHG

## 2021-11-16 DIAGNOSIS — N85.2 UTERINE HYPERTROPHY: ICD-10-CM

## 2021-11-16 DIAGNOSIS — I63.9 CEREBROVASCULAR ACCIDENT (CVA), UNSPECIFIED MECHANISM (HCC): Primary | ICD-10-CM

## 2021-11-16 DIAGNOSIS — Z01.818 PREOP TESTING: Primary | ICD-10-CM

## 2021-11-16 DIAGNOSIS — D62 ACUTE BLOOD LOSS ANEMIA: ICD-10-CM

## 2021-11-16 DIAGNOSIS — N93.9 VAGINAL BLEEDING: ICD-10-CM

## 2021-11-16 LAB
ABO/RH: NORMAL
ANTIBODY SCREEN: NORMAL
HCG QUALITATIVE: NEGATIVE
HCT VFR BLD CALC: 26 % (ref 34–48)
HEMOGLOBIN: 8.2 G/DL (ref 11.5–15.5)
MCH RBC QN AUTO: 29.7 PG (ref 26–35)
MCHC RBC AUTO-ENTMCNC: 31.5 % (ref 32–34.5)
MCV RBC AUTO: 94.2 FL (ref 80–99.9)
PDW BLD-RTO: 16.7 FL (ref 11.5–15)
PLATELET # BLD: 421 E9/L (ref 130–450)
PMV BLD AUTO: 9.5 FL (ref 7–12)
RBC # BLD: 2.76 E12/L (ref 3.5–5.5)
WBC # BLD: 9.5 E9/L (ref 4.5–11.5)

## 2021-11-16 PROCEDURE — 36415 COLL VENOUS BLD VENIPUNCTURE: CPT

## 2021-11-16 PROCEDURE — 97530 THERAPEUTIC ACTIVITIES: CPT | Performed by: OCCUPATIONAL THERAPIST

## 2021-11-16 PROCEDURE — 86900 BLOOD TYPING SEROLOGIC ABO: CPT

## 2021-11-16 PROCEDURE — 86850 RBC ANTIBODY SCREEN: CPT

## 2021-11-16 PROCEDURE — 86901 BLOOD TYPING SEROLOGIC RH(D): CPT

## 2021-11-16 PROCEDURE — 85027 COMPLETE CBC AUTOMATED: CPT

## 2021-11-16 PROCEDURE — 84703 CHORIONIC GONADOTROPIN ASSAY: CPT

## 2021-11-16 RX ORDER — DIPHENHYDRAMINE HYDROCHLORIDE 50 MG/ML
12.5 INJECTION INTRAMUSCULAR; INTRAVENOUS
Status: CANCELLED | OUTPATIENT
Start: 2021-11-16 | End: 2021-11-16

## 2021-11-16 RX ORDER — PROMETHAZINE HYDROCHLORIDE 25 MG/ML
6.25 INJECTION, SOLUTION INTRAMUSCULAR; INTRAVENOUS
Status: CANCELLED | OUTPATIENT
Start: 2021-11-16 | End: 2021-11-16

## 2021-11-16 RX ORDER — LABETALOL HYDROCHLORIDE 5 MG/ML
5 INJECTION, SOLUTION INTRAVENOUS EVERY 10 MIN PRN
Status: CANCELLED | OUTPATIENT
Start: 2021-11-16

## 2021-11-16 RX ORDER — MEPERIDINE HYDROCHLORIDE 25 MG/ML
12.5 INJECTION INTRAMUSCULAR; INTRAVENOUS; SUBCUTANEOUS EVERY 5 MIN PRN
Status: CANCELLED | OUTPATIENT
Start: 2021-11-16

## 2021-11-16 RX ORDER — HYDROCODONE BITARTRATE AND ACETAMINOPHEN 5; 325 MG/1; MG/1
1 TABLET ORAL PRN
Status: CANCELLED | OUTPATIENT
Start: 2021-11-16 | End: 2021-11-16

## 2021-11-16 RX ORDER — PROCHLORPERAZINE EDISYLATE 5 MG/ML
5 INJECTION INTRAMUSCULAR; INTRAVENOUS
Status: CANCELLED | OUTPATIENT
Start: 2021-11-16 | End: 2021-11-16

## 2021-11-16 RX ORDER — SODIUM CHLORIDE, SODIUM LACTATE, POTASSIUM CHLORIDE, CALCIUM CHLORIDE 600; 310; 30; 20 MG/100ML; MG/100ML; MG/100ML; MG/100ML
INJECTION, SOLUTION INTRAVENOUS CONTINUOUS
Status: CANCELLED | OUTPATIENT
Start: 2021-11-16

## 2021-11-16 RX ORDER — HYDROCODONE BITARTRATE AND ACETAMINOPHEN 5; 325 MG/1; MG/1
2 TABLET ORAL PRN
Status: CANCELLED | OUTPATIENT
Start: 2021-11-16 | End: 2021-11-16

## 2021-11-16 ASSESSMENT — PAIN SCALES - GENERAL: PAINLEVEL_OUTOF10: 0

## 2021-11-16 NOTE — PROGRESS NOTES
[]? Splinting                                   []? Scar Management                   []?Joint Protection/Training  []? Ergonomics                             []? Joint Mobilization                      []? Adaptive Equipment Assessment/Training                             []? Manual Edema Mobilization   []? Myofascial Release                 [x]? Energy Conservation/Work Simplification  [x]? GM/FM Coordination                []? Safety retraining/education per  individual diagnosis/goals  []? Desensitization        Patient Specific Goal: \" To get my hand back to normal\".                            VXQOS (Long term same as Short term):  1) Patient will demonstrate good understanding of home program (exercises/activities/diagnosis/prognosis/goals) with good accuracy. 2) Patient will demonstrate increased L UE strength to 4+/5  for ADL/IADL completion. 3) Patient will demonstrate increased /pinch strength of at least 10  of their left hand. 4) Increase in fine motor function as evidenced by decreased time to complete 9-hole peg test and/or MRMT test by at least 5-10 seconds. 5) Patient will report ADL functions as I with improved functional use of the left arm. 6) Patient will demonstrate improved functional activity tolerance from fair to good for ADL/IADL completion. 7) Patient will decrease QuickDASH score to 15% or less for increased participation in daily functional activities.        Pain Level: No headache today  Subjective: \" I was in the hospital because my blood was so low. They plan on doing a hysterectomy tomorrow to help. \"  Objective:  Updated POC to be completed by 11-28-21.     INTERVENTION: COMPLETED: SPECIFICS/COMMENTS:   Modality:     Paraffin Tx  10 min to help minimize hand pain and tightness from arthritis        Strengthening     Over shoulder ROM/ full arm  ex      - ball up the wall roll 15x  - ball to wall circles  UBE 2 min forward/ 2 back   Hand strengthening  -3# red digiflex composite 2-20, each digit 2-10 each finger   Forearm/ wrist strengthening  - red azul bar ( 15x twist in/ twist out, bending palms down, bending palms up)        Coordination ex     In hand manipulation X   - exercise balls in hand and on table/ getting better  -coin in hand manipulation/ 5 coins at a time   FM tasks x   - peg designs with in hand manipulation/ challenging   small / medium screw assembly  - Purdue peg board assembly   Dexterity ex  -ASL ROM ex/ continue at home             Other:     Endurance X   -Fair today  - Energy conservation information provided/ basics discussed- will review in more detail at next visit          Assessment/Comments: Pt is making Fair + progress toward stated plan of care. Pt states her follow up with the neurologist went well. She is healing from the stroke and was taken off the blood thinners. Due to persistent gynecological  bleeding, she is scheduled for a hysterectomy tomorrow. Will place on hold until after surgery and is approved to return.       -Rehab Potential: Good  -Requires OT Follow Up: Yes  Time In: 1300           Time Out: 1400 [facility]           Visit #: 5    Treatment Charges: Mins Units   Modalities: paraffin     Ther Exercise     Manual Therapy     Thera Activities 30 2   ADL/Home Mgt      Neuro Re-education     Group Therapy     Non-Billable Service Time: 15 15 0   Other     Total Time/Units 45 2       -Response to Treatment: Pt is hopeful she will feel better after surgery. She is still very tired/ fatigued. Goals: Goals for pt can be seen on initial eval occurring on 10-28-21    Plan:   [x]  Continue Plan of care:  Resume OT when approved by her physician. Pt education continues at each visit to obtain maximum benefits from skilled OT intervention.   []  Alter Plan of care:   []  Discharge:      Vladimir Mtz OT /L  [id number]

## 2021-11-16 NOTE — PROGRESS NOTES
[address] [facility]                                                                                                                    PRE OP INSTRUCTIONS FOR  Natalya Dang        Date: 11/16/2021    Date of surgery: 11/17/21   Arrival Time: Hospital will call you between 5pm and 7pm with your final arrival time for surgery    1. Do not eat or drink anything after midnight prior to surgery. This includes no water, chewing gum, mints or ice chips. 2. Take the following medications with a small sip of water on the morning of Surgery: none     3. Diabetics may take evening dose of insulin but none after midnight. If you feel symptomatic or low blood sugar morning of surgery drink 1-2 ounces of apple juice only. 4. Aspirin, Ibuprofen, Advil, Naproxen, Vitamin E and other Anti-inflammatory products should be stopped  before surgery  as directed by your physician. Take Tylenol only unless instructed otherwise by your surgeon. 5. Check with your Doctor regarding stopping Plavix, Coumadin, Lovenox, Eliquis, Effient, or other blood thinners. 6. Do not smoke,use illicit drugs and do not drink any alcoholic beverages 24 hours prior to surgery. 7. You may brush your teeth the morning of surgery. DO NOT SWALLOW WATER    8. You MUST make arrangements for a responsible adult to take you home after your surgery. You will not be allowed to leave alone or drive yourself home. It is strongly suggested someone stay with you the first 24 hrs. Your surgery will be cancelled if you do not have a ride home. 9. PEDIATRIC PATIENTS ONLY:  A parent/legal guardian must accompany a child scheduled for surgery and plan to stay at the hospital until the child is discharged. Please do not bring other children with you.     10. Please wear simple, loose fitting clothing to the hospital.  Hetal Hutchisonloretta not bring valuables (money, credit cards, checkbooks, etc.) Do not wear any makeup (including no eye makeup) or nail polish on your fingers or toes. 11. DO NOT wear any jewelry or piercings on day of surgery. All body piercing jewelry must be removed. 12. Shower the night before surgery with _x__Antibacterial soap /MARIN WIPES________    13. TOTAL JOINT REPLACEMENT/HYSTERECTOMY PATIENTS ONLY---Remember to bring Blood Bank bracelet to the hospital on the day of surgery. 14. If you have a Living Will and Durable Power of  for Healthcare, please bring in a copy. 15. If appropriate bring crutches, inspirex, WALKER, CANE etc... 12. Notify your Surgeon if you develop any illness between now and surgery time, cough, cold, fever, sore throat, nausea, vomiting, etc.  Please notify your surgeon if you experience dizziness, shortness of breath or blurred vision between now & the time of your surgery. 17. If you have ___dentures, they will be removed before going to the OR; we will provide you a container. If you wear ___contact lenses or ___glasses, they will be removed; please bring a case for them. 18. To provide excellent care visitors will be limited to 1 in the room at any given time. 19. Please bring picture ID and insurance card. 20. Sleep apnea patients need to bring CPAP AND SETTINGS to hospital on day of surgery. 21. During flu season no children under the age of 15 are permitted in the hospital for the safety of all patients. 22. Other                  Please call AMBULATORY CARE if you have any further questions.    [address] [facility]     [address]

## 2021-11-17 ENCOUNTER — HOSPITAL ENCOUNTER (OUTPATIENT)
Age: 51
Setting detail: OUTPATIENT SURGERY
Discharge: HOME | End: 2021-11-17
Attending: OBSTETRICS & GYNECOLOGY | Admitting: OBSTETRICS & GYNECOLOGY
Payer: COMMERCIAL

## 2021-11-17 ENCOUNTER — ANESTHESIA (OUTPATIENT)
Dept: OPERATING ROOM | Age: 51
End: 2021-11-17
Payer: COMMERCIAL

## 2021-11-17 VITALS
SYSTOLIC BLOOD PRESSURE: 125 MMHG | RESPIRATION RATE: 16 BRPM | HEART RATE: 74 BPM | TEMPERATURE: 97.1 F | DIASTOLIC BLOOD PRESSURE: 60 MMHG | OXYGEN SATURATION: 100 %

## 2021-11-17 VITALS
DIASTOLIC BLOOD PRESSURE: 56 MMHG | RESPIRATION RATE: 12 BRPM | SYSTOLIC BLOOD PRESSURE: 91 MMHG | OXYGEN SATURATION: 100 %

## 2021-11-17 DIAGNOSIS — N93.9 VAGINAL BLEEDING: Primary | ICD-10-CM

## 2021-11-17 PROBLEM — N92.1 MENOMETRORRHAGIA: Status: ACTIVE | Noted: 2021-11-17

## 2021-11-17 PROCEDURE — 7100000000 HC PACU RECOVERY - FIRST 15 MIN: Performed by: OBSTETRICS & GYNECOLOGY

## 2021-11-17 PROCEDURE — 3600000003 HC SURGERY LEVEL 3 BASE: Performed by: OBSTETRICS & GYNECOLOGY

## 2021-11-17 PROCEDURE — 88305 TISSUE EXAM BY PATHOLOGIST: CPT

## 2021-11-17 PROCEDURE — 2580000003 HC RX 258: Performed by: OBSTETRICS & GYNECOLOGY

## 2021-11-17 PROCEDURE — 3600000013 HC SURGERY LEVEL 3 ADDTL 15MIN: Performed by: OBSTETRICS & GYNECOLOGY

## 2021-11-17 PROCEDURE — 2709999900 HC NON-CHARGEABLE SUPPLY: Performed by: OBSTETRICS & GYNECOLOGY

## 2021-11-17 PROCEDURE — 6360000002 HC RX W HCPCS: Performed by: NURSE ANESTHETIST, CERTIFIED REGISTERED

## 2021-11-17 PROCEDURE — 3700000001 HC ADD 15 MINUTES (ANESTHESIA): Performed by: OBSTETRICS & GYNECOLOGY

## 2021-11-17 PROCEDURE — 7100000001 HC PACU RECOVERY - ADDTL 15 MIN: Performed by: OBSTETRICS & GYNECOLOGY

## 2021-11-17 PROCEDURE — 7100000010 HC PHASE II RECOVERY - FIRST 15 MIN: Performed by: OBSTETRICS & GYNECOLOGY

## 2021-11-17 PROCEDURE — 2500000003 HC RX 250 WO HCPCS: Performed by: NURSE ANESTHETIST, CERTIFIED REGISTERED

## 2021-11-17 PROCEDURE — 3700000000 HC ANESTHESIA ATTENDED CARE: Performed by: OBSTETRICS & GYNECOLOGY

## 2021-11-17 PROCEDURE — 7100000011 HC PHASE II RECOVERY - ADDTL 15 MIN: Performed by: OBSTETRICS & GYNECOLOGY

## 2021-11-17 RX ORDER — PROPOFOL 10 MG/ML
INJECTION, EMULSION INTRAVENOUS PRN
Status: DISCONTINUED | OUTPATIENT
Start: 2021-11-17 | End: 2021-11-17 | Stop reason: SDUPTHER

## 2021-11-17 RX ORDER — SODIUM CHLORIDE 9 MG/ML
25 INJECTION, SOLUTION INTRAVENOUS PRN
Status: DISCONTINUED | OUTPATIENT
Start: 2021-11-17 | End: 2021-11-17 | Stop reason: HOSPADM

## 2021-11-17 RX ORDER — DEXAMETHASONE SODIUM PHOSPHATE 10 MG/ML
INJECTION, SOLUTION INTRAMUSCULAR; INTRAVENOUS PRN
Status: DISCONTINUED | OUTPATIENT
Start: 2021-11-17 | End: 2021-11-17 | Stop reason: SDUPTHER

## 2021-11-17 RX ORDER — GLYCOPYRROLATE 1 MG/5 ML
SYRINGE (ML) INTRAVENOUS PRN
Status: DISCONTINUED | OUTPATIENT
Start: 2021-11-17 | End: 2021-11-17 | Stop reason: SDUPTHER

## 2021-11-17 RX ORDER — PROMETHAZINE HYDROCHLORIDE 25 MG/ML
INJECTION, SOLUTION INTRAMUSCULAR; INTRAVENOUS PRN
Status: DISCONTINUED | OUTPATIENT
Start: 2021-11-17 | End: 2021-11-17 | Stop reason: SDUPTHER

## 2021-11-17 RX ORDER — LIDOCAINE HYDROCHLORIDE 20 MG/ML
INJECTION, SOLUTION INTRAVENOUS PRN
Status: DISCONTINUED | OUTPATIENT
Start: 2021-11-17 | End: 2021-11-17 | Stop reason: SDUPTHER

## 2021-11-17 RX ORDER — SODIUM CHLORIDE, SODIUM LACTATE, POTASSIUM CHLORIDE, CALCIUM CHLORIDE 600; 310; 30; 20 MG/100ML; MG/100ML; MG/100ML; MG/100ML
INJECTION, SOLUTION INTRAVENOUS CONTINUOUS
Status: DISCONTINUED | OUTPATIENT
Start: 2021-11-17 | End: 2021-11-17 | Stop reason: HOSPADM

## 2021-11-17 RX ORDER — MEPERIDINE HYDROCHLORIDE 25 MG/ML
12.5 INJECTION INTRAMUSCULAR; INTRAVENOUS; SUBCUTANEOUS EVERY 5 MIN PRN
Status: DISCONTINUED | OUTPATIENT
Start: 2021-11-17 | End: 2021-11-17 | Stop reason: HOSPADM

## 2021-11-17 RX ORDER — SODIUM CHLORIDE 0.9 % (FLUSH) 0.9 %
10 SYRINGE (ML) INJECTION PRN
Status: DISCONTINUED | OUTPATIENT
Start: 2021-11-17 | End: 2021-11-17 | Stop reason: HOSPADM

## 2021-11-17 RX ORDER — MIDAZOLAM HYDROCHLORIDE 1 MG/ML
INJECTION INTRAMUSCULAR; INTRAVENOUS PRN
Status: DISCONTINUED | OUTPATIENT
Start: 2021-11-17 | End: 2021-11-17 | Stop reason: SDUPTHER

## 2021-11-17 RX ORDER — SODIUM CHLORIDE 0.9 % (FLUSH) 0.9 %
10 SYRINGE (ML) INJECTION EVERY 12 HOURS SCHEDULED
Status: DISCONTINUED | OUTPATIENT
Start: 2021-11-17 | End: 2021-11-17 | Stop reason: HOSPADM

## 2021-11-17 RX ORDER — FENTANYL CITRATE 50 UG/ML
INJECTION, SOLUTION INTRAMUSCULAR; INTRAVENOUS PRN
Status: DISCONTINUED | OUTPATIENT
Start: 2021-11-17 | End: 2021-11-17 | Stop reason: SDUPTHER

## 2021-11-17 RX ORDER — ONDANSETRON 2 MG/ML
INJECTION INTRAMUSCULAR; INTRAVENOUS PRN
Status: DISCONTINUED | OUTPATIENT
Start: 2021-11-17 | End: 2021-11-17 | Stop reason: SDUPTHER

## 2021-11-17 RX ORDER — MEPERIDINE HYDROCHLORIDE 50 MG/ML
INJECTION INTRAMUSCULAR; INTRAVENOUS; SUBCUTANEOUS PRN
Status: DISCONTINUED | OUTPATIENT
Start: 2021-11-17 | End: 2021-11-17 | Stop reason: SDUPTHER

## 2021-11-17 RX ADMIN — DEXAMETHASONE SODIUM PHOSPHATE 10 MG: 10 INJECTION, SOLUTION INTRAMUSCULAR; INTRAVENOUS at 14:17

## 2021-11-17 RX ADMIN — PROMETHAZINE HYDROCHLORIDE 12.5 MG: 25 INJECTION INTRAMUSCULAR; INTRAVENOUS at 14:30

## 2021-11-17 RX ADMIN — FENTANYL CITRATE 100 MCG: 50 INJECTION, SOLUTION INTRAMUSCULAR; INTRAVENOUS at 14:17

## 2021-11-17 RX ADMIN — SODIUM CHLORIDE, POTASSIUM CHLORIDE, SODIUM LACTATE AND CALCIUM CHLORIDE: 600; 310; 30; 20 INJECTION, SOLUTION INTRAVENOUS at 12:56

## 2021-11-17 RX ADMIN — MIDAZOLAM 2 MG: 1 INJECTION INTRAMUSCULAR; INTRAVENOUS at 14:13

## 2021-11-17 RX ADMIN — PROPOFOL 200 MG: 10 INJECTION, EMULSION INTRAVENOUS at 14:17

## 2021-11-17 RX ADMIN — LIDOCAINE HYDROCHLORIDE 100 MG: 20 INJECTION, SOLUTION INTRAVENOUS at 14:17

## 2021-11-17 RX ADMIN — MEPERIDINE HYDROCHLORIDE 25 MG: 50 INJECTION, SOLUTION INTRAMUSCULAR; INTRAVENOUS; SUBCUTANEOUS at 14:30

## 2021-11-17 RX ADMIN — Medication 0.2 MG: at 14:17

## 2021-11-17 RX ADMIN — ONDANSETRON 4 MG: 2 INJECTION INTRAMUSCULAR; INTRAVENOUS at 14:17

## 2021-11-17 ASSESSMENT — PULMONARY FUNCTION TESTS
PIF_VALUE: 6
PIF_VALUE: 2
PIF_VALUE: 1
PIF_VALUE: 1
PIF_VALUE: 2
PIF_VALUE: 0
PIF_VALUE: 2
PIF_VALUE: 2
PIF_VALUE: 3
PIF_VALUE: 1
PIF_VALUE: 13
PIF_VALUE: 2
PIF_VALUE: 2

## 2021-11-17 ASSESSMENT — PAIN SCALES - GENERAL
PAINLEVEL_OUTOF10: 0

## 2021-11-17 ASSESSMENT — PAIN - FUNCTIONAL ASSESSMENT: PAIN_FUNCTIONAL_ASSESSMENT: 0-10

## 2021-11-17 NOTE — OP NOTE
PATIENT NAME:    Rosanna Blanca    DATE OF PROCEDURE:                      11/17/2021  SURGEON:                                            Joan Mar M.D.   ASSISTANT:   PREOPERATIVE DIAGNOSIS:              Menometrorrhagia + with secondary anemia  POSTOPERATIVE DIAGNOSIS:            Same   OPERATION:                                          Hysteroscopy and D&C. ANESTHESIA:                                         General.   ESTIMATED BLOOD LOSS:                Minimal.   COMPLICATIONS:                                  None. PROCEDURE NOTE: With the patient in the supine position, general anesthesia was administered without any complications. The patient was then placed in dorsal lithotomy position using cane stirrups. The perineum was scrubbed and draped in the usual fashion. The bladder was catheterized, and clear looking urine was recovered. A heavy weighted retractor was placed in the vagina. The anterior lip of the cervix was grabbed by single-tooth tenaculum. The cervical os was dilated to allow the position of #16 Natasha Rajas without any difficulty. A 5.5 mm diagnostic hysteroscope was introduced into the uterine cavity, and using normal saline as distending medium, diagnostic hysteroscopy was carried out. Thickened endometrium and normal tubal ostia were seen. The hysteroscope was then withdrawn. Next, sharp curettage of the endometrium was performed, and all tissues were submitted to pathology. Inspection revealed excellent hemostasis. The procedure was then terminated. All instruments were removed. The patient was placed in supine position, awakened from anesthesia and transferred to recovery room in good stable condition.              Torrey Lane MD  11/17/2021  2:30 PM

## 2021-11-17 NOTE — ANESTHESIA POSTPROCEDURE EVALUATION
Department of Anesthesiology  Postprocedure Note    Patient: Ramiro Saavedra  MRN: [id number]  YOB: 1970  Date of evaluation: 11/17/2021  Time:  4:04 PM     Procedure Summary     Date: 11/17/21 Room / Location: [address] [place] [address] / [address]    Anesthesia Start: 3609 Anesthesia Stop: 1440    Procedure: DILATATION AND CURETTAGE HYSTEROSCOPY (N/A ) Diagnosis: (MENOMETRORRHAGIA)    Surgeons: Minoo Thomas MD Responsible Provider: Aide Herring MD    Anesthesia Type: general ASA Status: 3          Anesthesia Type: general    Grazyna Phase I: Grazyna Score: 10    Grazyna Phase II: Grazyna Score: 10    Last vitals: Reviewed and per EMR flowsheets.        Anesthesia Post Evaluation    Patient location during evaluation: PACU  Patient participation: complete - patient participated  Level of consciousness: awake  Pain score: 0  Airway patency: patent  Nausea & Vomiting: no nausea  Complications: no  Cardiovascular status: blood pressure returned to baseline  Respiratory status: acceptable  Hydration status: euvolemic

## 2021-11-17 NOTE — ANESTHESIA PRE PROCEDURE
Department of Anesthesiology  Preprocedure Note       Name:  Rosie Patterson   Age:  54 y.o.  :  1970                                          MRN:  [id number]         Date:  2021      Surgeon: Carmine Arechiga):  Mariza Noriega MD    Procedure: Procedure(s):  DILATATION AND CURETTAGE HYSTEROSCOPY    Medications prior to admission:   Prior to Admission medications    Medication Sig Start Date End Date Taking? Authorizing Provider   medroxyPROGESTERone (PROVERA) 10 MG tablet Take 1 tablet by mouth 2 times daily 11/15/21  Yes Elías Hodges MD   folic acid (FOLVITE) 283 MCG tablet Take 800 mcg by mouth daily   Yes Historical Provider, MD   Multiple Vitamins-Minerals (MULTIVITAMIN ADULT) CHEW Take 1 tablet by mouth daily   Yes Historical Provider, MD   Omega-3 Fatty Acids (FISH OIL) 1000 MG CAPS Take 1,000 mg by mouth daily   Yes Historical Provider, MD   atorvastatin (LIPITOR) 80 MG tablet Take 80 mg by mouth nightly   Yes Historical Provider, MD   ferrous sulfate (IRON 325) 325 (65 Fe) MG tablet Take 325 mg by mouth 2 times daily    Yes Historical Provider, MD   NIACIN PO Take 1 tablet by mouth daily    Yes Historical Provider, MD   vitamin B-1 (THIAMINE) 100 MG tablet Take 100 mg by mouth daily   Yes Historical Provider, MD   Vitamin D (CHOLECALCIFEROL) 25 MCG (1000 UT) TABS tablet Take 1,000 Units by mouth daily   Yes Historical Provider, MD   methotrexate (RHEUMATREX) 2.5 MG chemo tablet Take 20 mg by mouth once a week Friday (8 Tablets)   Yes Historical Provider, MD       Current medications:    No current facility-administered medications for this encounter.      Current Outpatient Medications   Medication Sig Dispense Refill    medroxyPROGESTERone (PROVERA) 10 MG tablet Take 1 tablet by mouth 2 times daily 60 tablet 0    folic acid (FOLVITE) 619 MCG tablet Take 800 mcg by mouth daily      Multiple Vitamins-Minerals (MULTIVITAMIN ADULT) CHEW Take 1 tablet by mouth daily      Omega-3 Fatty Acids (FISH OIL) 1000 MG CAPS Take 1,000 mg by mouth daily      atorvastatin (LIPITOR) 80 MG tablet Take 80 mg by mouth nightly      ferrous sulfate (IRON 325) 325 (65 Fe) MG tablet Take 325 mg by mouth 2 times daily       NIACIN PO Take 1 tablet by mouth daily       vitamin B-1 (THIAMINE) 100 MG tablet Take 100 mg by mouth daily      Vitamin D (CHOLECALCIFEROL) 25 MCG (1000 UT) TABS tablet Take 1,000 Units by mouth daily      methotrexate (RHEUMATREX) 2.5 MG chemo tablet Take 20 mg by mouth once a week Friday (8 Tablets)         Allergies: Allergies   Allergen Reactions    Amoxicillin Itching and Other (See Comments)     Urine tract       Problem List:    Patient Active Problem List   Diagnosis Code    Left carpal tunnel syndrome G56.02    Vaginal bleeding N93.9    Uterine hypertrophy N85.2    Acute blood loss anemia D62    Encounter for blood transfusion Z51.89    Cerebrovascular accident (CVA) ([facility] [address] I63.9       Past Medical History:        Diagnosis Date    Anemia     Arthritis     rheumatoid     Cerebral artery occlusion with cerebral infarction ([facility] [address]      left hand feels heavy    History of blood transfusion     Hx of blood clots     vaginal       Past Surgical History:        Procedure Laterality Date    CARPAL TUNNEL RELEASE Left 02/12/2021    Left Carpal Tunnel Release    CARPAL TUNNEL RELEASE Left 2/12/2021    LEFT CARPAL TUNNEL RELEASE performed by Carol Wells DO at [facility] [address]  2005       Social History:    Social History     Tobacco Use    Smoking status: Never Smoker    Smokeless tobacco: Never Used   Substance Use Topics    Alcohol use: Not Currently     Comment: social                                Counseling given: Not Answered      Vital Signs (Current): There were no vitals filed for this visit.                                            BP Readings from Last 3 Encounters:   11/16/21 (!) 114/56   11/15/21 (!) 126/57   10/22/21 125/80       NPO Neuro/Psych:   (+) CVA:, neuromuscular disease:,             GI/Hepatic/Renal: Neg GI/Hepatic/Renal ROS            Endo/Other:    (+) blood dyscrasia: anemia, arthritis:., .                 Abdominal:             Vascular:           ROS comment: H/O Blood Clots. . Other Findings:           Anesthesia Plan      general     ASA 3       Induction: intravenous. BIS  MIPS: Postoperative opioids intended. Anesthetic plan and risks discussed with patient. Plan discussed with CRNA.     Attending anesthesiologist reviewed and agrees with Shelly Rowe MD   11/17/2021

## 2021-11-17 NOTE — H&P
Department of Gynecology  H&P Note          CHIEF COMPLAINT:   Severe vaginal bleeding with secondary anemia    History obtained from patient, electronic medical record    HISTORY OF PRESENT ILLNESS:     The patient is a 54 y.o. female with significant past medical history of menometrorrhagia who presents with recurrent vaginal bleeding and severe anemia. Patient was admitted to the hospital 2 days ago and received 2 units of packed RBCs. This was the second episode of transfusion because of the heavy flow. Patient was on blood thinners due to recent stroke and Eliquis was stopped 5 days ago. She was treated with Provera to slow down her bleeding. Past Medical History:        Diagnosis Date    Anemia     Arthritis     rheumatoid     Cerebral artery occlusion with cerebral infarction ([address]      left hand feels heavy    History of blood transfusion     Hx of blood clots     vaginal     Past Surgical History:        Procedure Laterality Date    CARPAL TUNNEL RELEASE Left 02/12/2021    Left Carpal Tunnel Release    CARPAL TUNNEL RELEASE Left 2/12/2021    LEFT CARPAL TUNNEL RELEASE performed by Mary Vallejo DO at [facility], [address]  2005       Past Gynecological History:    1. Last menstrual period: 4 weeks ago  2. Menses: interval:  4 weeks  duration:  12 day(s)  3. Contraception: None  4. Sexually transmitted disease history: none        A.  Number of sexual partners in the last 6 months: 1    meds:  Current Facility-Administered Medications:     lactated ringers infusion, , IntraVENous, Continuous, Elías Hodges MD, Last Rate: 125 mL/hr at 11/17/21 1256, New Bag at 11/17/21 1256    sodium chloride flush 0.9 % injection 10 mL, 10 mL, IntraVENous, 2 times per day, Elías Hodges MD    sodium chloride flush 0.9 % injection 10 mL, 10 mL, IntraVENous, PRN, Elías Hodges MD    0.9 % sodium chloride infusion, 25 mL, IntraVENous, PRN, Elías Hodges MD    lactated ringers infusion, , IntraVENous, Continuous, Matt Aburto MD    meperidine (DEMEROL) injection 12.5 mg, 12.5 mg, IntraVENous, Q5 Min PRN, Brandan Huber MD    HYDROmorphone (DILAUDID) injection 0.5 mg, 0.5 mg, IntraVENous, Q5 Min PRN, Brandan Huber MD       Allergies:  Amoxicillin     Social History:  TOBACCO:   reports that she has never smoked. She has never used smokeless tobacco.  ETOH:   reports previous alcohol use. DRUGS:   reports no history of drug use.     Family History:       Problem Relation Age of Onset    Other Mother     Arthritis Father     Diabetes Father     Other Father     Breast Cancer Paternal Aunt        Review of Systems  Review of Systems -  General ROS: negative for - chills, fatigue or malaise  ENT ROS: negative for - hearing change, nasal congestion or nasal discharge  Allergy and Immunology ROS: negative for - hives, itchy/watery eyes or nasal congestion  Hematological and Lymphatic ROS: negative for - blood clots, blood transfusions, bruising or fatigue  Endocrine ROS: negative for - malaise/lethargy, mood swings, palpitations or polydipsia/polyuria  Breast ROS: negative for - new or changing breast lumps or nipple changes  Respiratory ROS: negative for - sputum changes, stridor, tachypnea or wheezing  Cardiovascular ROS: negative for - irregular heartbeat, loss of consciousness, murmur or orthopnea  Gastrointestinal ROS: negative for - constipation, diarrhea, gas/bloating, heartburn or hematemesis  Genito-Urinary ROS: negative for -  genital discharge, genital ulcers or hematuria  Musculoskeletal ROS: negative for - gait disturbance, muscle pain or muscular weakness       PHYSICAL EXAM:    Vitals:  BP (!) 110/51   Pulse 71   Temp 97.8 °F (36.6 °C) (Infrared)   Resp 16   LMP 09/30/2021   SpO2 97%     General appearance:  NAD  Pyscho/social: neg for tremors and hallucinations  Head: NCAT, PERRLA, EOMI, red conjunctiva  Neck: supple, no masses  Lungs: CTAB, equal chest rise bilateral  Heart: Reg rate  Abdomen: soft, moderately tender in RUQ, nondistended  Skin; no lesions  Gu: no cva tenderness  Extremities: extremities normal, atraumatic, no cyanosis or edema    External Genitalia: General appearance; normal, Hair distribution; normal, Lesions absent  Urethral Meatus: Size normal, Location normal, Lesions absent, Prolapse absent  Vagina: General appearance normal, Estrogen effect normal, Discharge absent, Lesions absent, Pelvic support normal  Cervix: General appearance normal, Lesions absent, Discharge absent, Tenderness absent, Enlargement absent, Nodularity absent  Uterus: 10 to 12 weeks in size  Adenexa: Masses absent, Tenderness absent    DATA:  Labs:    CBC:   Lab Results   Component Value Date    WBC 9.5 11/16/2021    RBC 2.76 11/16/2021    HGB 8.2 11/16/2021    HCT 26.0 11/16/2021    MCV 94.2 11/16/2021    RDW 16.7 11/16/2021     11/16/2021       IMPRESSION/RECOMMENDATIONS:      Menometrorrhagia  Severe acute blood loss anemia, status post blood transfusion    Plan  Hysteroscopy D&C      The patient was counseled at length about the risks of tad Covid-19 during their perioperative period and any recovery window from their procedure. The patient was made aware that tad Covid-19  may worsen their prognosis for recovering from their procedure  and lend to a higher morbidity and/or mortality risk. All material risks, benefits, and reasonable alternatives including postponing the procedure were discussed. The patient does wish to proceed with the procedure at this time.

## 2021-11-24 ENCOUNTER — TREATMENT (OUTPATIENT)
Dept: PHYSICAL THERAPY | Age: 51
End: 2021-11-24
Payer: COMMERCIAL

## 2021-11-24 ENCOUNTER — TREATMENT (OUTPATIENT)
Dept: OCCUPATIONAL THERAPY | Age: 51
End: 2021-11-24
Payer: COMMERCIAL

## 2021-11-24 DIAGNOSIS — I63.9 CEREBROVASCULAR ACCIDENT (CVA), UNSPECIFIED MECHANISM (HCC): Primary | ICD-10-CM

## 2021-11-24 PROCEDURE — 97110 THERAPEUTIC EXERCISES: CPT | Performed by: PHYSICAL THERAPIST

## 2021-11-24 PROCEDURE — 97530 THERAPEUTIC ACTIVITIES: CPT | Performed by: OCCUPATIONAL THERAPIST

## 2021-11-24 RX ORDER — SODIUM CHLORIDE, SODIUM LACTATE, POTASSIUM CHLORIDE, CALCIUM CHLORIDE 600; 310; 30; 20 MG/100ML; MG/100ML; MG/100ML; MG/100ML
INJECTION, SOLUTION INTRAVENOUS CONTINUOUS
Status: CANCELLED | OUTPATIENT
Start: 2021-11-24

## 2021-11-24 NOTE — PROGRESS NOTES
Physical Therapy Daily Treatment Note    Date: 2021  Patient Name: Gerald Moran  : 1970   MRN: [id number]  DOInjury: 10/03/2021  DOSx: --   Referring Provider: Consuelo Soler  [address] [address],  [address] [facility]      Medical Diagnosis:      Diagnosis Orders   1. Cerebrovascular accident (CVA), unspecified mechanism ([address]         Outcome Measure:   LEFS 11%    PRINT AND TEXT  Access Code: [email or web address]  URL: [email or web address]  Date: 2021  Prepared by: Jason AdverseEvents    Exercises  Staggered Stance Single Arm Row with Anchored Resistance - 2-3 x weekly - 3 sets - 15 reps  Split Stance Shoulder Row with Resistance - 2-3 x weekly - 3 sets - 15 reps  Standing Floor Level Row - 2-3 x weekly - 3 sets - 10-15 reps  Shoulder External Rotation with Anchored Resistance - 2-3 x weekly - 3 sets - 10-15 reps  Shoulder Internal Rotation with Resistance - 2-3 x weekly - 3 sets - 10-15 reps  Scaption with Dumbbells - 2-3 x weekly - 3 sets - 10-15 reps      X = TO BE PERFORMED NEXT VISIT  > = PROGRESS TO THIS    S: Reports the exercises cause appropriate shoulder fatigue. She has not performed them as much as she would like because of having a low hemoglobin, getting 2 units of blood, and having a D&C 21. Her current restrictions are 10 lbs. Hysterectomy is planned 21. O:    Time 4085-1393     Visit 3/3 Repeat outcome measure at mid point and end. Pain Pain 0/10     Exercise      ROWS: H Blue 3 x 12-15  TA   ROWS: M Blue 3 x 12-15  TA   ROWS: L Blue 3 x 12-15  TA   ER Green 3 x 12-15  TE   IR Blue 3 x 12-15  TE   Flexion in scapular plane 3 lbs 3 x 12                       A:  Tolerated well. Discussed ways to grade resistance and to keep her efforts in the light to medium range for now because of D&C. She is to follow post-op restrictions after her hysterectomy and ask surgeon about exercise post-procedure. P: Last visit today.   Patient to call or stop in with questions / concerns/ change in status.      Alida Felix, PT    Treatment Charges: Mins Units   Initial Evaluation     Re-Evaluation     Ther Exercise         TE 40 3   Manual Therapy     MT     Ther Activities        TA     Gait Training          GT     Neuro Re-education NR     Modalities     Non-Billable Service Time     Other     Total Time/Units 40 3

## 2021-11-30 ENCOUNTER — HOSPITAL ENCOUNTER (OUTPATIENT)
Dept: PREADMISSION TESTING | Age: 51
Discharge: HOME | End: 2021-11-30
Payer: COMMERCIAL

## 2021-11-30 ENCOUNTER — ANESTHESIA EVENT (OUTPATIENT)
Dept: OPERATING ROOM | Age: 51
End: 2021-11-30
Payer: COMMERCIAL

## 2021-11-30 VITALS
TEMPERATURE: 97.2 F | DIASTOLIC BLOOD PRESSURE: 69 MMHG | HEIGHT: 66 IN | RESPIRATION RATE: 16 BRPM | BODY MASS INDEX: 33.27 KG/M2 | SYSTOLIC BLOOD PRESSURE: 123 MMHG | OXYGEN SATURATION: 97 % | HEART RATE: 71 BPM | WEIGHT: 207 LBS

## 2021-11-30 DIAGNOSIS — N93.9 VAGINAL BLEEDING: ICD-10-CM

## 2021-11-30 DIAGNOSIS — Z01.818 PRE-OP TESTING: Primary | ICD-10-CM

## 2021-11-30 LAB
ABO/RH: NORMAL
ANTIBODY SCREEN: NORMAL
BASOPHILS ABSOLUTE: 0.03 E9/L (ref 0–0.2)
BASOPHILS RELATIVE PERCENT: 0.6 % (ref 0–2)
EOSINOPHILS ABSOLUTE: 0.18 E9/L (ref 0.05–0.5)
EOSINOPHILS RELATIVE PERCENT: 3.4 % (ref 0–6)
HCG(URINE) PREGNANCY TEST: NEGATIVE
HCT VFR BLD CALC: 31.7 % (ref 34–48)
HEMOGLOBIN: 9.7 G/DL (ref 11.5–15.5)
IMMATURE GRANULOCYTES #: 0.02 E9/L
IMMATURE GRANULOCYTES %: 0.4 % (ref 0–5)
LYMPHOCYTES ABSOLUTE: 1.19 E9/L (ref 1.5–4)
LYMPHOCYTES RELATIVE PERCENT: 22.7 % (ref 20–42)
MCH RBC QN AUTO: 29.1 PG (ref 26–35)
MCHC RBC AUTO-ENTMCNC: 30.6 % (ref 32–34.5)
MCV RBC AUTO: 95.2 FL (ref 80–99.9)
MONOCYTES ABSOLUTE: 0.25 E9/L (ref 0.1–0.95)
MONOCYTES RELATIVE PERCENT: 4.8 % (ref 2–12)
NEUTROPHILS ABSOLUTE: 3.58 E9/L (ref 1.8–7.3)
NEUTROPHILS RELATIVE PERCENT: 68.1 % (ref 43–80)
PDW BLD-RTO: 15.3 FL (ref 11.5–15)
PLATELET # BLD: 368 E9/L (ref 130–450)
PMV BLD AUTO: 10.1 FL (ref 7–12)
RBC # BLD: 3.33 E12/L (ref 3.5–5.5)
WBC # BLD: 5.3 E9/L (ref 4.5–11.5)

## 2021-11-30 PROCEDURE — 86850 RBC ANTIBODY SCREEN: CPT

## 2021-11-30 PROCEDURE — 86900 BLOOD TYPING SEROLOGIC ABO: CPT

## 2021-11-30 PROCEDURE — 86901 BLOOD TYPING SEROLOGIC RH(D): CPT

## 2021-11-30 PROCEDURE — 36415 COLL VENOUS BLD VENIPUNCTURE: CPT

## 2021-11-30 PROCEDURE — 81025 URINE PREGNANCY TEST: CPT

## 2021-11-30 PROCEDURE — 85025 COMPLETE CBC W/AUTO DIFF WBC: CPT

## 2021-11-30 NOTE — PROGRESS NOTES
[id number] [facility]                                                                                                                    PRE OP INSTRUCTIONS FOR  Danielle Sainz        Date: 11/30/2021    Date of surgery: 12/1/21   Arrival Time: Hospital will call you between 5pm and 7pm with your final arrival time for surgery    1. Do not eat or drink anything after midnight prior to surgery. This includes no water, chewing gum, mints or ice chips. 2. Take the following medications with a small sip of water on the morning of Surgery: nasal spray     3. Diabetics may take evening dose of insulin but none after midnight. If you feel symptomatic or low blood sugar morning of surgery drink 1-2 ounces of apple juice only. 4. Aspirin, Ibuprofen, Advil, Naproxen, Vitamin E and other Anti-inflammatory products should be stopped  before surgery  as directed by your physician. Take Tylenol only unless instructed otherwise by your surgeon. 5. Check with your Doctor regarding stopping Plavix, Coumadin, Lovenox, Eliquis, Effient, or other blood thinners. 6. Do not smoke,use illicit drugs and do not drink any alcoholic beverages 24 hours prior to surgery. 7. You may brush your teeth the morning of surgery. DO NOT SWALLOW WATER    8. You MUST make arrangements for a responsible adult to take you home after your surgery. You will not be allowed to leave alone or drive yourself home. It is strongly suggested someone stay with you the first 24 hrs. Your surgery will be cancelled if you do not have a ride home. 9. PEDIATRIC PATIENTS ONLY:  A parent/legal guardian must accompany a child scheduled for surgery and plan to stay at the hospital until the child is discharged. Please do not bring other children with you.     10. Please wear simple, loose fitting clothing to the hospital.  Mathew Cordero not bring valuables (money, credit cards, checkbooks, etc.) Do not wear any makeup (including no eye makeup) or nail polish on your fingers or toes. 11. DO NOT wear any jewelry or piercings on day of surgery. All body piercing jewelry must be removed. 12. Shower the night before surgery with ___Antibacterial soap    13. TOTAL JOINT REPLACEMENT/HYSTERECTOMY PATIENTS ONLY---Remember to bring Blood Bank bracelet to the hospital on the day of surgery. 14. If you have a Living Will and Durable Power of  for Healthcare, please bring in a copy. 15. If appropriate bring crutches, inspirex, WALKER, CANE etc... 12. Notify your Surgeon if you develop any illness between now and surgery time, cough, cold, fever, sore throat, nausea, vomiting, etc.  Please notify your surgeon if you experience dizziness, shortness of breath or blurred vision between now & the time of your surgery. 17. If you have ___dentures, they will be removed before going to the OR; we will provide you a container. If you wear ___contact lenses or ___glasses, they will be removed; please bring a case for them. 18. To provide excellent care visitors will be limited to 1 in the room at any given time. 23. During flu season no children under the age of 15 are permitted in the hospital for the safety of all patients. 20. Other                  Please call AMBULATORY CARE if you have any further questions.    [address] [facility]     [address]

## 2021-12-01 ENCOUNTER — ANESTHESIA (OUTPATIENT)
Dept: OPERATING ROOM | Age: 51
End: 2021-12-01
Payer: COMMERCIAL

## 2021-12-01 ENCOUNTER — HOSPITAL ENCOUNTER (OUTPATIENT)
Age: 51
Setting detail: OBSERVATION
Discharge: HOME | End: 2021-12-02
Attending: OBSTETRICS & GYNECOLOGY | Admitting: OBSTETRICS & GYNECOLOGY
Payer: COMMERCIAL

## 2021-12-01 VITALS
RESPIRATION RATE: 2 BRPM | OXYGEN SATURATION: 100 % | DIASTOLIC BLOOD PRESSURE: 118 MMHG | SYSTOLIC BLOOD PRESSURE: 188 MMHG

## 2021-12-01 DIAGNOSIS — N92.1 MENOMETRORRHAGIA: Primary | ICD-10-CM

## 2021-12-01 PROCEDURE — 6360000002 HC RX W HCPCS: Performed by: NURSE ANESTHETIST, CERTIFIED REGISTERED

## 2021-12-01 PROCEDURE — 2500000003 HC RX 250 WO HCPCS: Performed by: NURSE ANESTHETIST, CERTIFIED REGISTERED

## 2021-12-01 PROCEDURE — 2700000000 HC OXYGEN THERAPY PER DAY

## 2021-12-01 PROCEDURE — 3600000019 HC SURGERY ROBOT ADDTL 15MIN: Performed by: OBSTETRICS & GYNECOLOGY

## 2021-12-01 PROCEDURE — 2580000003 HC RX 258: Performed by: OBSTETRICS & GYNECOLOGY

## 2021-12-01 PROCEDURE — S2900 ROBOTIC SURGICAL SYSTEM: HCPCS | Performed by: OBSTETRICS & GYNECOLOGY

## 2021-12-01 PROCEDURE — 2720000010 HC SURG SUPPLY STERILE: Performed by: OBSTETRICS & GYNECOLOGY

## 2021-12-01 PROCEDURE — 88307 TISSUE EXAM BY PATHOLOGIST: CPT

## 2021-12-01 PROCEDURE — G0378 HOSPITAL OBSERVATION PER HR: HCPCS

## 2021-12-01 PROCEDURE — 2500000003 HC RX 250 WO HCPCS: Performed by: OBSTETRICS & GYNECOLOGY

## 2021-12-01 PROCEDURE — 3700000000 HC ANESTHESIA ATTENDED CARE: Performed by: OBSTETRICS & GYNECOLOGY

## 2021-12-01 PROCEDURE — 3600000009 HC SURGERY ROBOT BASE: Performed by: OBSTETRICS & GYNECOLOGY

## 2021-12-01 PROCEDURE — 3700000001 HC ADD 15 MINUTES (ANESTHESIA): Performed by: OBSTETRICS & GYNECOLOGY

## 2021-12-01 PROCEDURE — 7100000000 HC PACU RECOVERY - FIRST 15 MIN: Performed by: OBSTETRICS & GYNECOLOGY

## 2021-12-01 PROCEDURE — 6360000002 HC RX W HCPCS: Performed by: OBSTETRICS & GYNECOLOGY

## 2021-12-01 PROCEDURE — 6360000002 HC RX W HCPCS

## 2021-12-01 PROCEDURE — 2709999900 HC NON-CHARGEABLE SUPPLY: Performed by: OBSTETRICS & GYNECOLOGY

## 2021-12-01 PROCEDURE — 7100000001 HC PACU RECOVERY - ADDTL 15 MIN: Performed by: OBSTETRICS & GYNECOLOGY

## 2021-12-01 RX ORDER — IBUPROFEN 600 MG/1
600 TABLET ORAL EVERY 6 HOURS PRN
Status: DISCONTINUED | OUTPATIENT
Start: 2021-12-01 | End: 2021-12-02 | Stop reason: HOSPADM

## 2021-12-01 RX ORDER — SODIUM CHLORIDE 0.9 % (FLUSH) 0.9 %
5-40 SYRINGE (ML) INJECTION PRN
Status: DISCONTINUED | OUTPATIENT
Start: 2021-12-01 | End: 2021-12-02 | Stop reason: HOSPADM

## 2021-12-01 RX ORDER — ACETAMINOPHEN 325 MG/1
650 TABLET ORAL EVERY 4 HOURS PRN
Status: DISCONTINUED | OUTPATIENT
Start: 2021-12-01 | End: 2021-12-02 | Stop reason: HOSPADM

## 2021-12-01 RX ORDER — KETOROLAC TROMETHAMINE 30 MG/ML
30 INJECTION, SOLUTION INTRAMUSCULAR; INTRAVENOUS EVERY 6 HOURS
Status: DISCONTINUED | OUTPATIENT
Start: 2021-12-01 | End: 2021-12-02 | Stop reason: HOSPADM

## 2021-12-01 RX ORDER — FENTANYL CITRATE 50 UG/ML
INJECTION, SOLUTION INTRAMUSCULAR; INTRAVENOUS PRN
Status: DISCONTINUED | OUTPATIENT
Start: 2021-12-01 | End: 2021-12-01 | Stop reason: SDUPTHER

## 2021-12-01 RX ORDER — OXYCODONE HYDROCHLORIDE AND ACETAMINOPHEN 5; 325 MG/1; MG/1
2 TABLET ORAL EVERY 4 HOURS PRN
Status: DISCONTINUED | OUTPATIENT
Start: 2021-12-01 | End: 2021-12-02 | Stop reason: HOSPADM

## 2021-12-01 RX ORDER — CIPROFLOXACIN 2 MG/ML
400 INJECTION, SOLUTION INTRAVENOUS
Status: COMPLETED | OUTPATIENT
Start: 2021-12-01 | End: 2021-12-01

## 2021-12-01 RX ORDER — ROCURONIUM BROMIDE 10 MG/ML
INJECTION, SOLUTION INTRAVENOUS PRN
Status: DISCONTINUED | OUTPATIENT
Start: 2021-12-01 | End: 2021-12-01 | Stop reason: SDUPTHER

## 2021-12-01 RX ORDER — SODIUM CHLORIDE 0.9 % (FLUSH) 0.9 %
10 SYRINGE (ML) INJECTION EVERY 12 HOURS SCHEDULED
Status: DISCONTINUED | OUTPATIENT
Start: 2021-12-01 | End: 2021-12-01 | Stop reason: HOSPADM

## 2021-12-01 RX ORDER — OXYCODONE HYDROCHLORIDE AND ACETAMINOPHEN 5; 325 MG/1; MG/1
1 TABLET ORAL EVERY 4 HOURS PRN
Status: DISCONTINUED | OUTPATIENT
Start: 2021-12-01 | End: 2021-12-02 | Stop reason: HOSPADM

## 2021-12-01 RX ORDER — PROPOFOL 10 MG/ML
INJECTION, EMULSION INTRAVENOUS PRN
Status: DISCONTINUED | OUTPATIENT
Start: 2021-12-01 | End: 2021-12-01 | Stop reason: SDUPTHER

## 2021-12-01 RX ORDER — SODIUM CHLORIDE, SODIUM LACTATE, POTASSIUM CHLORIDE, CALCIUM CHLORIDE 600; 310; 30; 20 MG/100ML; MG/100ML; MG/100ML; MG/100ML
INJECTION, SOLUTION INTRAVENOUS CONTINUOUS
Status: DISCONTINUED | OUTPATIENT
Start: 2021-12-01 | End: 2021-12-02 | Stop reason: HOSPADM

## 2021-12-01 RX ORDER — SODIUM CHLORIDE 9 MG/ML
25 INJECTION, SOLUTION INTRAVENOUS PRN
Status: DISCONTINUED | OUTPATIENT
Start: 2021-12-01 | End: 2021-12-02 | Stop reason: HOSPADM

## 2021-12-01 RX ORDER — MIDAZOLAM HYDROCHLORIDE 1 MG/ML
INJECTION INTRAMUSCULAR; INTRAVENOUS PRN
Status: DISCONTINUED | OUTPATIENT
Start: 2021-12-01 | End: 2021-12-01 | Stop reason: SDUPTHER

## 2021-12-01 RX ORDER — NEOSTIGMINE METHYLSULFATE 1 MG/ML
INJECTION, SOLUTION INTRAVENOUS PRN
Status: DISCONTINUED | OUTPATIENT
Start: 2021-12-01 | End: 2021-12-01 | Stop reason: SDUPTHER

## 2021-12-01 RX ORDER — GLYCOPYRROLATE 1 MG/5 ML
SYRINGE (ML) INTRAVENOUS PRN
Status: DISCONTINUED | OUTPATIENT
Start: 2021-12-01 | End: 2021-12-01 | Stop reason: SDUPTHER

## 2021-12-01 RX ORDER — ONDANSETRON 2 MG/ML
INJECTION INTRAMUSCULAR; INTRAVENOUS PRN
Status: DISCONTINUED | OUTPATIENT
Start: 2021-12-01 | End: 2021-12-01 | Stop reason: SDUPTHER

## 2021-12-01 RX ORDER — SODIUM CHLORIDE 9 MG/ML
25 INJECTION, SOLUTION INTRAVENOUS PRN
Status: DISCONTINUED | OUTPATIENT
Start: 2021-12-01 | End: 2021-12-01 | Stop reason: HOSPADM

## 2021-12-01 RX ORDER — SODIUM CHLORIDE 0.9 % (FLUSH) 0.9 %
5-40 SYRINGE (ML) INJECTION EVERY 12 HOURS SCHEDULED
Status: DISCONTINUED | OUTPATIENT
Start: 2021-12-01 | End: 2021-12-02 | Stop reason: HOSPADM

## 2021-12-01 RX ORDER — SODIUM CHLORIDE 0.9 % (FLUSH) 0.9 %
10 SYRINGE (ML) INJECTION PRN
Status: DISCONTINUED | OUTPATIENT
Start: 2021-12-01 | End: 2021-12-01 | Stop reason: HOSPADM

## 2021-12-01 RX ORDER — LIDOCAINE HYDROCHLORIDE 20 MG/ML
INJECTION, SOLUTION INTRAVENOUS PRN
Status: DISCONTINUED | OUTPATIENT
Start: 2021-12-01 | End: 2021-12-01 | Stop reason: SDUPTHER

## 2021-12-01 RX ORDER — DEXAMETHASONE SODIUM PHOSPHATE 10 MG/ML
INJECTION, SOLUTION INTRAMUSCULAR; INTRAVENOUS PRN
Status: DISCONTINUED | OUTPATIENT
Start: 2021-12-01 | End: 2021-12-01 | Stop reason: SDUPTHER

## 2021-12-01 RX ORDER — FENTANYL CITRATE 50 UG/ML
25 INJECTION, SOLUTION INTRAMUSCULAR; INTRAVENOUS EVERY 5 MIN PRN
Status: DISCONTINUED | OUTPATIENT
Start: 2021-12-01 | End: 2021-12-01 | Stop reason: HOSPADM

## 2021-12-01 RX ADMIN — Medication 0.6 MG: at 17:58

## 2021-12-01 RX ADMIN — SODIUM CHLORIDE, POTASSIUM CHLORIDE, SODIUM LACTATE AND CALCIUM CHLORIDE: 600; 310; 30; 20 INJECTION, SOLUTION INTRAVENOUS at 12:42

## 2021-12-01 RX ADMIN — MIDAZOLAM 2 MG: 1 INJECTION INTRAMUSCULAR; INTRAVENOUS at 16:34

## 2021-12-01 RX ADMIN — Medication 10 ML: at 20:42

## 2021-12-01 RX ADMIN — LIDOCAINE HYDROCHLORIDE 100 MG: 20 INJECTION, SOLUTION INTRAVENOUS at 16:38

## 2021-12-01 RX ADMIN — HYDROMORPHONE HYDROCHLORIDE 0.5 MG: 1 INJECTION, SOLUTION INTRAMUSCULAR; INTRAVENOUS; SUBCUTANEOUS at 18:30

## 2021-12-01 RX ADMIN — ROCURONIUM BROMIDE 10 MG: 10 SOLUTION INTRAVENOUS at 17:36

## 2021-12-01 RX ADMIN — FENTANYL CITRATE 50 MCG: 50 INJECTION, SOLUTION INTRAMUSCULAR; INTRAVENOUS at 18:02

## 2021-12-01 RX ADMIN — Medication 3 MG: at 17:58

## 2021-12-01 RX ADMIN — Medication 0.5 MG: at 18:22

## 2021-12-01 RX ADMIN — FENTANYL CITRATE 50 MCG: 50 INJECTION, SOLUTION INTRAMUSCULAR; INTRAVENOUS at 17:08

## 2021-12-01 RX ADMIN — Medication 0.5 MG: at 18:30

## 2021-12-01 RX ADMIN — HYDROMORPHONE HYDROCHLORIDE 0.5 MG: 1 INJECTION, SOLUTION INTRAMUSCULAR; INTRAVENOUS; SUBCUTANEOUS at 18:22

## 2021-12-01 RX ADMIN — DEXAMETHASONE SODIUM PHOSPHATE 10 MG: 10 INJECTION, SOLUTION INTRAMUSCULAR; INTRAVENOUS at 16:45

## 2021-12-01 RX ADMIN — PROPOFOL 200 MG: 10 INJECTION, EMULSION INTRAVENOUS at 16:38

## 2021-12-01 RX ADMIN — ROCURONIUM BROMIDE 40 MG: 10 SOLUTION INTRAVENOUS at 16:38

## 2021-12-01 RX ADMIN — CIPROFLOXACIN 400 MG: 2 INJECTION INTRAVENOUS at 16:49

## 2021-12-01 RX ADMIN — METRONIDAZOLE 500 MG: 500 INJECTION, SOLUTION INTRAVENOUS at 16:52

## 2021-12-01 RX ADMIN — ONDANSETRON 4 MG: 2 INJECTION INTRAMUSCULAR; INTRAVENOUS at 17:59

## 2021-12-01 RX ADMIN — KETOROLAC TROMETHAMINE 30 MG: 30 INJECTION, SOLUTION INTRAMUSCULAR; INTRAVENOUS at 21:05

## 2021-12-01 RX ADMIN — FENTANYL CITRATE 150 MCG: 50 INJECTION, SOLUTION INTRAMUSCULAR; INTRAVENOUS at 16:38

## 2021-12-01 ASSESSMENT — PULMONARY FUNCTION TESTS
PIF_VALUE: 0
PIF_VALUE: 16
PIF_VALUE: 16
PIF_VALUE: 36
PIF_VALUE: 36
PIF_VALUE: 1
PIF_VALUE: 36
PIF_VALUE: 8
PIF_VALUE: 0
PIF_VALUE: 0
PIF_VALUE: 8
PIF_VALUE: 20
PIF_VALUE: 36
PIF_VALUE: 36
PIF_VALUE: 8
PIF_VALUE: 36
PIF_VALUE: 36
PIF_VALUE: 3
PIF_VALUE: 15
PIF_VALUE: 16
PIF_VALUE: 7
PIF_VALUE: 25
PIF_VALUE: 36
PIF_VALUE: 4
PIF_VALUE: 36
PIF_VALUE: 16
PIF_VALUE: 36
PIF_VALUE: 6
PIF_VALUE: 17
PIF_VALUE: 16
PIF_VALUE: 17
PIF_VALUE: 36
PIF_VALUE: 36
PIF_VALUE: 16
PIF_VALUE: 32
PIF_VALUE: 10
PIF_VALUE: 16
PIF_VALUE: 7
PIF_VALUE: 17
PIF_VALUE: 0
PIF_VALUE: 36
PIF_VALUE: 36
PIF_VALUE: 4
PIF_VALUE: 45
PIF_VALUE: 36
PIF_VALUE: 17
PIF_VALUE: 29
PIF_VALUE: 16
PIF_VALUE: 16
PIF_VALUE: 36
PIF_VALUE: 16
PIF_VALUE: 7
PIF_VALUE: 36
PIF_VALUE: 10
PIF_VALUE: 3
PIF_VALUE: 6
PIF_VALUE: 17
PIF_VALUE: 36
PIF_VALUE: 25
PIF_VALUE: 16
PIF_VALUE: 36
PIF_VALUE: 36
PIF_VALUE: 17
PIF_VALUE: 36
PIF_VALUE: 7
PIF_VALUE: 21
PIF_VALUE: 17
PIF_VALUE: 16
PIF_VALUE: 37
PIF_VALUE: 16
PIF_VALUE: 32
PIF_VALUE: 36
PIF_VALUE: 7
PIF_VALUE: 16
PIF_VALUE: 36
PIF_VALUE: 16
PIF_VALUE: 36
PIF_VALUE: 17
PIF_VALUE: 16
PIF_VALUE: 36
PIF_VALUE: 19
PIF_VALUE: 36
PIF_VALUE: 16
PIF_VALUE: 16
PIF_VALUE: 26
PIF_VALUE: 17
PIF_VALUE: 36
PIF_VALUE: 26
PIF_VALUE: 8
PIF_VALUE: 13

## 2021-12-01 ASSESSMENT — PAIN DESCRIPTION - FREQUENCY
FREQUENCY: CONTINUOUS

## 2021-12-01 ASSESSMENT — PAIN DESCRIPTION - ORIENTATION
ORIENTATION: MID

## 2021-12-01 ASSESSMENT — PAIN DESCRIPTION - LOCATION
LOCATION: ABDOMEN

## 2021-12-01 ASSESSMENT — PAIN DESCRIPTION - DESCRIPTORS
DESCRIPTORS: ACHING;DISCOMFORT
DESCRIPTORS: ACHING;CRAMPING
DESCRIPTORS: ACHING;DISCOMFORT;SHARP;SHOOTING
DESCRIPTORS: ACHING;DISCOMFORT

## 2021-12-01 ASSESSMENT — PAIN SCALES - GENERAL
PAINLEVEL_OUTOF10: 3
PAINLEVEL_OUTOF10: 5
PAINLEVEL_OUTOF10: 10
PAINLEVEL_OUTOF10: 0
PAINLEVEL_OUTOF10: 3
PAINLEVEL_OUTOF10: 7

## 2021-12-01 ASSESSMENT — PAIN - FUNCTIONAL ASSESSMENT: PAIN_FUNCTIONAL_ASSESSMENT: 0-10

## 2021-12-01 ASSESSMENT — PAIN DESCRIPTION - PROGRESSION
CLINICAL_PROGRESSION: RAPIDLY WORSENING
CLINICAL_PROGRESSION: GRADUALLY IMPROVING

## 2021-12-01 ASSESSMENT — PAIN DESCRIPTION - PAIN TYPE
TYPE: ACUTE PAIN;SURGICAL PAIN

## 2021-12-01 NOTE — ANESTHESIA PRE PROCEDURE
Department of Anesthesiology  Preprocedure Note       Name:  Jeanine Terry   Age:  54 y.o.  :  1970                                          MRN:  [id number]         Date:  2021      Surgeon: Faye Browning):  Abby Shaffer MD    Procedure: Procedure(s):  ROBOTIC XI ASSISTED TOTAL LAPAROSCOPIC HYSTERECTOMY WITH BILATERAL SALPINGECTOMY    Medications prior to admission:   Prior to Admission medications    Medication Sig Start Date End Date Taking?  Authorizing Provider   NONFORMULARY Taking medication for sinus infection- does not name of  Also on a nasal spray-does not know name of    Historical Provider, MD   medroxyPROGESTERone (PROVERA) 10 MG tablet Take 1 tablet by mouth 2 times daily 11/15/21   Elías Hodges MD   folic acid (FOLVITE) 316 MCG tablet Take 800 mcg by mouth daily    Historical Provider, MD   Multiple Vitamins-Minerals (MULTIVITAMIN ADULT) CHEW Take 1 tablet by mouth daily    Historical Provider, MD   Omega-3 Fatty Acids (FISH OIL) 1000 MG CAPS Take 1,000 mg by mouth daily    Historical Provider, MD   atorvastatin (LIPITOR) 80 MG tablet Take 80 mg by mouth nightly    Historical Provider, MD   ferrous sulfate (IRON 325) 325 (65 Fe) MG tablet Take 325 mg by mouth 2 times daily     Historical Provider, MD   NIACIN PO Take 1 tablet by mouth daily     Historical Provider, MD   vitamin B-1 (THIAMINE) 100 MG tablet Take 100 mg by mouth daily    Historical Provider, MD   Vitamin D (CHOLECALCIFEROL) 25 MCG (1000 UT) TABS tablet Take 1,000 Units by mouth daily    Historical Provider, MD   methotrexate (RHEUMATREX) 2.5 MG chemo tablet Take 20 mg by mouth once a week Friday (8 Tablets)    Historical Provider, MD       Current medications:    Current Facility-Administered Medications   Medication Dose Route Frequency Provider Last Rate Last Admin    lactated ringers infusion   IntraVENous Continuous Elías Hodges MD        sodium chloride flush 0.9 % injection 10 mL  10 mL IntraVENous 2 times per day Elías Hodges MD        sodium chloride flush 0.9 % injection 10 mL  10 mL IntraVENous PRN Elías Hodges MD        0.9 % sodium chloride infusion  25 mL IntraVENous PRN Elías Hodges MD        metronidazole (FLAGYL) 500 mg in NaCl 100 mL IVPB premix  500 mg IntraVENous On Call to OR Elías Hodges MD        And    ciprofloxacin (CIPRO) IVPB 400 mg  400 mg IntraVENous On Call to MD Ren        lactated ringers infusion   IntraVENous Continuous Aiderosa Herring MD           Allergies:     Allergies   Allergen Reactions    Amoxicillin Itching and Other (See Comments)     Urine tract       Problem List:    Patient Active Problem List   Diagnosis Code    Left carpal tunnel syndrome G56.02    Vaginal bleeding N93.9    Uterine hypertrophy N85.2    Acute blood loss anemia D62    Encounter for blood transfusion Z51.89    Cerebrovascular accident (CVA) ([facility] I63.9    Menometrorrhagia N92.1       Past Medical History:        Diagnosis Date    Anemia     Arthritis     rheumatoid     Cerebral artery occlusion with cerebral infarction ([facility]      left hand feels heavy    History of blood transfusion     11/16/21    Hx of blood clots     vaginal    Menometrorrhagia 11/17/2021       Past Surgical History:        Procedure Laterality Date    CARPAL TUNNEL RELEASE Left 02/12/2021    Left Carpal Tunnel Release    CARPAL TUNNEL RELEASE Left 2/12/2021    LEFT CARPAL TUNNEL RELEASE performed by Saadia Khan DO at [facility] N/A 11/17/2021    DILATATION AND CURETTAGE HYSTEROSCOPY performed by Minoo Thomas MD at [place] [address]  2005       Social History:    Social History     Tobacco Use    Smoking status: Never Smoker    Smokeless tobacco: Never Used   Substance Use Topics    Alcohol use: Not Currently     Comment: social                                Counseling given: Not Answered      Vital Signs (Current):   Vitals:    12/01/21 1218   BP: (!) 118/56   Pulse: 78   Resp: 16   Temp: 97.7 °F (36.5 °C)   TempSrc: Infrared   SpO2: 99%                                              BP Readings from Last 3 Encounters:   12/01/21 (!) 118/56   11/30/21 123/69   11/17/21 (!) 91/56       NPO Status: Time of last liquid consumption: 2330                        Time of last solid consumption: 1930                        Date of last liquid consumption: 11/30/21                        Date of last solid food consumption: 11/30/21    BMI:   Wt Readings from Last 3 Encounters:   11/30/21 207 lb (93.9 kg)   11/16/21 213 lb 8 oz (96.8 kg)   11/14/21 214 lb (97.1 kg)     There is no height or weight on file to calculate BMI.    CBC:   Lab Results   Component Value Date    WBC 5.3 11/30/2021    RBC 3.33 11/30/2021    HGB 9.7 11/30/2021    HCT 31.7 11/30/2021    MCV 95.2 11/30/2021    RDW 15.3 11/30/2021     11/30/2021       CMP:   Lab Results   Component Value Date     11/14/2021    K 3.4 11/14/2021     11/14/2021    CO2 21 11/14/2021    BUN 7 11/14/2021    CREATININE 0.8 11/14/2021    GFRAA >60 11/14/2021    LABGLOM >60 11/14/2021    GLUCOSE 123 11/14/2021    PROT 6.4 11/14/2021    CALCIUM 8.3 11/14/2021    BILITOT 0.6 11/14/2021    ALKPHOS 93 11/14/2021    AST 27 11/14/2021    ALT 29 11/14/2021       POC Tests: No results for input(s): POCGLU, POCNA, POCK, POCCL, POCBUN, POCHEMO, POCHCT in the last 72 hours.     Coags:   Lab Results   Component Value Date    APTT 30.0 10/04/2021       HCG (If Applicable):   Lab Results   Component Value Date    PREGTESTUR NEGATIVE 11/30/2021        ABGs: No results found for: PHART, PO2ART, XJW3SKI, MRG7MZO, BEART, X2ZOKSZQ     Type & Screen (If Applicable):  No results found for: LABABO, LABRH    Drug/Infectious Status (If Applicable):  No results found for: HIV, HEPCAB    COVID-19 Screening (If Applicable):   Lab Results   Component Value Date    COVID19 Not Detected 02/05/2021           Anesthesia Evaluation  Patient summary reviewed  Airway: Mallampati: II  TM distance: >3 FB   Neck ROM: full  Mouth opening: > = 3 FB Dental:          Pulmonary:Negative Pulmonary ROS breath sounds clear to auscultation                             Cardiovascular:Negative CV ROS          ECG reviewed  Rhythm: regular  Rate: normal                 ROS comment: EKG: Normal Sinus Rhythm 97, NS St & T changes. St & T changes. Neuro/Psych:   (+) CVA:, neuromuscular disease:,             GI/Hepatic/Renal:   (+) morbid obesity          Endo/Other:    (+) blood dyscrasia: anemia, arthritis:., .                  ROS comment: Menometrorrhagia  Abdominal:             Vascular: negative vascular ROS. ROS comment:   . Other Findings:             Anesthesia Plan      general     ASA 3       Induction: intravenous. BIS  MIPS: Postoperative opioids intended and Prophylactic antiemetics administered. Anesthetic plan and risks discussed with patient. Plan discussed with CRNA.             [address] Abdi Salmon DO   12/1/2021

## 2021-12-01 NOTE — H&P
Department of Gynecology  H&P Note          CHIEF COMPLAINT:   Severe vaginal bleeding with secondary anemia    History obtained from patient, electronic medical record    HISTORY OF PRESENT ILLNESS:     The patient is a 54 y.o. female with significant past medical history of uterine hypertrophy and uterine fibroids who presents with severe vaginal bleeding that led to severe anemia. Patient has received 2 units of blood on 2 different occasions just to keep her hemoglobin over 7. Has been treated with hormonal therapy to no avail. Patient was on Eliquis for recent stroke and that was discontinued about 10 days ago. Past Medical History:        Diagnosis Date    Anemia     Arthritis     rheumatoid     Cerebral artery occlusion with cerebral infarction ([address]      left hand feels heavy    History of blood transfusion     11/16/21    Hx of blood clots     vaginal    Menometrorrhagia 11/17/2021     Past Surgical History:        Procedure Laterality Date    CARPAL TUNNEL RELEASE Left 02/12/2021    Left Carpal Tunnel Release    CARPAL TUNNEL RELEASE Left 2/12/2021    LEFT CARPAL TUNNEL RELEASE performed by Savana Willis DO at [facility] N/A 11/17/2021    DILATATION AND CURETTAGE HYSTEROSCOPY performed by Leslie Casanova MD at [address] [address]  2005       Past Gynecological History:    1. Last menstrual period: 10/5/2021  2. Menses: interval:  2 weeks  duration:  8 day(s)  3. Contraception: None  4. Sexually transmitted disease history: none        A.  Number of sexual partners in the last 6 months: 1    meds:  Current Facility-Administered Medications:     lactated ringers infusion, , IntraVENous, Continuous, Elías Hodges MD, Last Rate: 125 mL/hr at 12/01/21 1242, New Bag at 12/01/21 1242    sodium chloride flush 0.9 % injection 10 mL, 10 mL, IntraVENous, 2 times per day, Elías Hodges MD    sodium chloride flush 0.9 % injection 10 mL, 10 mL, IntraVENous, PRN, Elías Hodges MD    0.9 % sodium chloride infusion, 25 mL, IntraVENous, PRN, Elías Hodges MD    metronidazole (FLAGYL) 500 mg in NaCl 100 mL IVPB premix, 500 mg, IntraVENous, On Call to OR **AND** ciprofloxacin (CIPRO) IVPB 400 mg, 400 mg, IntraVENous, On Call to OR, Elías Hodges MD    lactated ringers infusion, , IntraVENous, Continuous, Carmen Perez MD    HYDROmorphone (DILAUDID) injection 0.5 mg, 0.5 mg, IntraVENous, Q5 Min PRN, Enrike Perea DO    fentaNYL (SUBLIMAZE) injection 25 mcg, 25 mcg, IntraVENous, Q5 Min PRN, Enrike Perea DO       Allergies:  Amoxicillin     Social History:  TOBACCO:   reports that she has never smoked. She has never used smokeless tobacco.  ETOH:   reports previous alcohol use. DRUGS:   reports no history of drug use.     Family History:       Problem Relation Age of Onset    Other Mother     Arthritis Father     Diabetes Father     Other Father     Breast Cancer Paternal Aunt        Review of Systems  Review of Systems -  General ROS: negative for - chills, fatigue or malaise  ENT ROS: negative for - hearing change, nasal congestion or nasal discharge  Allergy and Immunology ROS: negative for - hives, itchy/watery eyes or nasal congestion  Hematological and Lymphatic ROS: negative for - blood clots, blood transfusions, bruising or fatigue  Endocrine ROS: negative for - malaise/lethargy, mood swings, palpitations or polydipsia/polyuria  Breast ROS: negative for - new or changing breast lumps or nipple changes  Respiratory ROS: negative for - sputum changes, stridor, tachypnea or wheezing  Cardiovascular ROS: negative for - irregular heartbeat, loss of consciousness, murmur or orthopnea  Gastrointestinal ROS: negative for - constipation, diarrhea, gas/bloating, heartburn or hematemesis  Genito-Urinary ROS: negative for -  genital discharge, genital ulcers or hematuria  Musculoskeletal ROS: negative for - gait disturbance, muscle pain or muscular weakness       PHYSICAL EXAM:    Vitals:  BP (!) 118/56   Pulse 78   Temp 97.7 °F (36.5 °C) (Infrared)   Resp 16   SpO2 99%     General appearance:  NAD  Pyscho/social: neg for tremors and hallucinations  Head: NCAT, PERRLA, EOMI, red conjunctiva  Neck: supple, no masses  Lungs: CTAB, equal chest rise bilateral  Heart: Reg rate  Abdomen: soft, moderately tender in RUQ, nondistended  Skin; no lesions  Gu: no cva tenderness  Extremities: extremities normal, atraumatic, no cyanosis or edema    External Genitalia: General appearance; normal, Hair distribution; normal, Lesions absent  Urethral Meatus: Size normal, Location normal, Lesions absent, Prolapse absent  Cervix: General appearance normal, Lesions absent, Discharge absent, Tenderness absent, Enlargement absent, Nodularity absent  Uterus: 10 to 12 weeks size with irregular borders  Adenexa: Masses absent, Tenderness absent  Anus/Perineum: Lesions absent and Masses absent    DATA:  Labs:    CBC:   Lab Results   Component Value Date    WBC 5.3 11/30/2021    RBC 3.33 11/30/2021    HGB 9.7 11/30/2021    HCT 31.7 11/30/2021    MCV 95.2 11/30/2021    RDW 15.3 11/30/2021     11/30/2021       IMPRESSION/RECOMMENDATIONS:      Menometrorrhagia  Uterine hypertrophy  Uterine fibroids    Plan  Robotic assisted total laparoscopic hysterectomy plus bilateral salpingectomy. The patient was counseled at length about the risks of tad Covid-19 during their perioperative period and any recovery window from their procedure. The patient was made aware that tad Covid-19  may worsen their prognosis for recovering from their procedure  and lend to a higher morbidity and/or mortality risk. All material risks, benefits, and reasonable alternatives including postponing the procedure were discussed. The patient does wish to proceed with the procedure at this time.

## 2021-12-01 NOTE — OP NOTE
SURGEON:     KAMALJIT Lobo:     PATIENT NAME:   Bairon Clement:     12/1/2021    PREOPERATIVE DIAGNOSIS:  Menometrorrhagia, uterine hypertrophy, uterine fibroids and severe secondary anemia, status post blood transfusion    POSTOPERATIVE DIAGNOSIS:  Same     OPERATION:     Robotic-assisted total laparoscopic hysterectomy + bilateral  Salpingectomies     ANESTHESIA:    General ETT. ESTIMATED BLOOD LOSS:   Less than 100 mL. COMPLICATIONS:    None noted. SPECIMENS:     Uterus, cervix and bilateral tubes     FINDINGS:     10 weeks uterus with normal ovaries and tubes       PROCEDURE: The patient was brought into the operating room, and general   anesthesia with endotracheal intubation was then performed. The patient was   then placed in the lower lithotomy position with Jamie stirrups. The patient   was prepped and draped in the usual sterile fashion. The left arm was   tucked. The right arm was left out on the arm board. Attention then was brought to the pelvis. A Caro was then placed and   a "[employer] 2nd generation uterine manipulator was then placed. Uterus was   sounded. A medium size KOH cup was then applied, and the uterine manipulator was then introduced. Attention was then brought to the umbilicus and a Veress needle was   introduced. Intraabdominal inflation was proceeded with and deemed adequate. A 8-mm trocar was then introduced about 15 cm above the level of the uterine fundus fully pushed in. Laparoscope was then inserted into port 2 controlled by arm 2 of the robot and was used to confirm   correct placement. The accessory trocars were then placed. Three robotic trocars were then placed at the same level as the camera port. These were 8 mm. The first 2 were placed   approximately 10 cm lateral to the midline at the same level, and the 3rd was   placed approximately 5 cm medial to the right trocar.  A 12 mm trocar was then placed about 5 cm lateral to the camera port at the same level. The [employer] [facility] patient cart   was then prepared, brought in at a left-side docking, and the robotic arms   were then docked, and then the camera was then docked. At this time, the robotic instruments were then placed. The 1-arm had the ProGrasp. The 3-arm had the vessel sealer , and the 4-arm had the Endo Xavier . The surgeon then broke scrub and moved to the surgeon's cart, and a robotic   time-out was then performed. The operation was then begun. Using the vessel sealer, the right round ligament was then coapted and divided, and with the Endo Xavier, the round ligament was then divided and the posterior peritoneum was taken down to the pelvic side wall. The retroperitoneum was then developed, and the right ureter was then identified directly in its retroperitoneal space. The right  tubo-ovarian ligament and remainder of the fallopian tube   were then coapted and divided with vessel sealer. The posterior peritoneum was then taken down to the level of the   uterine artery with the Endo Xavier. Similar procedure was then performed   on the opposite side. Then on the patient's left a bladder flap was then begun. The anterior peritoneum was then mobilized, and the bladder well mobilized using the Endo Xavier. All 3 arms were then used throughout the procedure, and the bladder was well mobilized off the cervix and down approximately 2-3 cm past the cup, and the vagina was well visualized. The   posterior peritoneum was then visualized for both uterine arteries. Both   uterine arteries were then skeletonized. With cephalad traction on the   manipulator, both uterine arteries were then skeletonized, coapted with the   Vessel sealer, and then divided with the Endo Xavier. The anterior colpotomy was then performed along with a posterior colpotomy at 3 and 9 o'clock. The remaining portion was then coapted with bipolar energy.  The specimen was then freed and then removed through the vagina. Once the specimen was then freed and removed from the vagina, occlusion was then continued with a bulb syringe. and then the cuff was then closed in a running fashion using the   V-Loc. Copious irrigation was placed in the pelvis. The pelvis was   irrigated free. Both ureters were reinspected and found to be normal and   Peristalsing. Valarie powder was applied to all operative sites. Then the patient cart was then undocked, and then the trocars were then   removed under direct visualization. CO2 gas was removed. Fascia at the   midline was closed with 0 Vicryl. Skin was then closed with subcuticular   stitch of 4-0 Vicryl, and Steri strips were then placed on the sites. The   patient was then awoken from general anesthesia, and endotracheal intubation   was then removed. The patient was then transferred to the recovery room in   stable condition. The patient's operative course was unremarkable.     Norman Moyer MD

## 2021-12-02 VITALS
SYSTOLIC BLOOD PRESSURE: 134 MMHG | RESPIRATION RATE: 16 BRPM | OXYGEN SATURATION: 98 % | HEART RATE: 53 BPM | DIASTOLIC BLOOD PRESSURE: 69 MMHG | TEMPERATURE: 98.4 F

## 2021-12-02 LAB
HCT VFR BLD CALC: 28.8 % (ref 34–48)
HEMOGLOBIN: 9 G/DL (ref 11.5–15.5)

## 2021-12-02 PROCEDURE — 96376 TX/PRO/DX INJ SAME DRUG ADON: CPT

## 2021-12-02 PROCEDURE — 36415 COLL VENOUS BLD VENIPUNCTURE: CPT

## 2021-12-02 PROCEDURE — 96374 THER/PROPH/DIAG INJ IV PUSH: CPT

## 2021-12-02 PROCEDURE — 85018 HEMOGLOBIN: CPT

## 2021-12-02 PROCEDURE — 6360000002 HC RX W HCPCS: Performed by: OBSTETRICS & GYNECOLOGY

## 2021-12-02 PROCEDURE — 85014 HEMATOCRIT: CPT

## 2021-12-02 PROCEDURE — 2580000003 HC RX 258: Performed by: OBSTETRICS & GYNECOLOGY

## 2021-12-02 PROCEDURE — G0378 HOSPITAL OBSERVATION PER HR: HCPCS

## 2021-12-02 RX ORDER — OXYCODONE HYDROCHLORIDE AND ACETAMINOPHEN 5; 325 MG/1; MG/1
1 TABLET ORAL EVERY 6 HOURS PRN
Qty: 20 TABLET | Refills: 0 | Status: SHIPPED | OUTPATIENT
Start: 2021-12-02 | End: 2021-12-07

## 2021-12-02 RX ADMIN — KETOROLAC TROMETHAMINE 30 MG: 30 INJECTION, SOLUTION INTRAMUSCULAR; INTRAVENOUS at 02:21

## 2021-12-02 RX ADMIN — KETOROLAC TROMETHAMINE 30 MG: 30 INJECTION, SOLUTION INTRAMUSCULAR; INTRAVENOUS at 08:18

## 2021-12-02 RX ADMIN — SODIUM CHLORIDE, POTASSIUM CHLORIDE, SODIUM LACTATE AND CALCIUM CHLORIDE: 600; 310; 30; 20 INJECTION, SOLUTION INTRAVENOUS at 08:55

## 2021-12-02 ASSESSMENT — PAIN SCALES - GENERAL
PAINLEVEL_OUTOF10: 2
PAINLEVEL_OUTOF10: 5
PAINLEVEL_OUTOF10: 0

## 2021-12-02 NOTE — CARE COORDINATION
CM note: discharge order noted, no identified discharge needs per IDR and chart review. Pt is POD1 hysterectomy, kept overnight for pain control. Pt lives alone but has support from family, has a PCP and medical insurance for medications, family will transport home.

## 2021-12-07 ENCOUNTER — TREATMENT (OUTPATIENT)
Dept: OCCUPATIONAL THERAPY | Age: 51
End: 2021-12-07
Payer: COMMERCIAL

## 2021-12-07 DIAGNOSIS — I63.9 CEREBROVASCULAR ACCIDENT (CVA), UNSPECIFIED MECHANISM (HCC): Primary | ICD-10-CM

## 2021-12-07 PROCEDURE — 97530 THERAPEUTIC ACTIVITIES: CPT | Performed by: OCCUPATIONAL THERAPIST

## 2021-12-07 NOTE — PROGRESS NOTES
[address] [facility] [address]  Dept: [address] OT Fax: [phone]    OCCUPATIONAL THERAPY PROGRESS NOTE    Date:  2021  Initial Evaluation Date: 10-28-21    Patient Name:  Anibal Rose    :  1970    Restrictions/Precautions:  No lifting over 10#, Low fall risk, Hx anemia and RA  Diagnosis:  CVA  Unspecified (163.9)                                                            Date of Surgery/Injury: 17-3-44     Insurance/Certification information:  [address] of care signed (Y/N): N  Visit# / total visits:  recommended     Referring Practitioner:  Dr Eliana Ratliff  Specific Practitioner Orders: OT     Assessment of current deficits   []? Functional mobility             [x]? ADLs          [x]? Strength                  []? Cognition   []? Functional transfers           [x]? IADLs         []? Safety Awareness  [x]? Endurance   [x]? Fine Motor Coordination    []? Balance      [x]? Vision/perception   []? Sensation     [x]? Gross Motor Coordination []? ROM           []? Pain                        []? Edema          []? Scar Adhesion/Skin Integrity      OT PLAN OF CARE   OT POC based on physician orders, patient diagnosis and results of clinical assessment     Frequency/Duration: 2x/ week x 6 weeks  Specific OT Treatment to include:      [x]? Instruction in HEP                   Modalities:  [x]?  Therapeutic Exercise                 []? Ultrasound               []? Electrical Stimulation/Attended  []? PROM/Stretching                    []? Fluidotherapy          []?  Paraffin                   []? AAROM  [x]?  AROM                 []? Iontophoresis:   []? Tendon Dotty Barley  []? Neuromuscular Re-Ed            []? ADL/IADL re-training    [x]?  Therapeutic Activity                  []? Pain Management with/without modalities PRN                 []? Manual Therapy   []? Splinting                                   []? Scar Management                   []?Joint Protection/Training  []? Ergonomics                             []? Joint Mobilization                      []? Adaptive Equipment Assessment/Training                             []? Manual Edema Mobilization   []? Myofascial Release                 [x]? Energy Conservation/Work Simplification  [x]? GM/FM Coordination                []? Safety retraining/education per  individual diagnosis/goals  []? Desensitization        Patient Specific Goal: \" To get my hand back to normal\".                            LVRIS (Long term same as Short term):  1) Patient will demonstrate good understanding of home program (exercises/activities/diagnosis/prognosis/goals) with good accuracy. 2) Patient will demonstrate increased L UE strength to 4+/5  for ADL/IADL completion. 3) Patient will demonstrate increased /pinch strength of at least 10  of their left hand. 4) Increase in fine motor function as evidenced by decreased time to complete 9-hole peg test and/or MRMT test by at least 5-10 seconds. 5) Patient will report ADL functions as I with improved functional use of the left arm. 6) Patient will demonstrate improved functional activity tolerance from fair to good for ADL/IADL completion. 7) Patient will decrease QuickDASH score to 15% or less for increased participation in daily functional activities.        Pain Level: No pain reported  Subjective: \" I am so tired. I started to cancel coming\". Objective:  Updated POC to be completed by 11-28-21.     INTERVENTION: COMPLETED: SPECIFICS/COMMENTS:   Modality:     Paraffin Tx  10 min to help minimize hand pain and tightness from arthritis        Strengthening     Over shoulder ROM/ full arm  ex      - ball up the wall roll 15x  - ball to wall circles  UBE 2 min forward/ 2 back   Hand strengthening x -3# red digiflex composite 2-20, each digit 2-10 each finger Forearm/ wrist strengthening  - red azul bar ( 15x twist in/ twist out, bending palms down, bending palms up)        Coordination ex     In hand manipulation X  x - exercise balls in hand and on table/ getting better  -coin in hand manipulation/ 5 coins at a time   FM tasks x  x - peg designs with in hand manipulation/ better today  - xsmall screw assembly  - Purdue peg board assembly   Dexterity ex x -ASL ROM ex/ continue at home             Other:     Endurance X   -Fair + today  - Energy conservation information provided/ basics discussed- will review in more detail at next visit          Assessment/Comments: Pt is making Good progress toward stated plan of care. Pt is seen today following recent hysterectomy. Significant fatigue is reported. Tx limited to FM and hand strengthening to avoid straining during exercise. Pt requests holding therapy til the end of next week to allow more time for recovery. Will also hold retesting til next visit. -Rehab Potential: Good  -Requires OT Follow Up: Yes  Time In: 1400          Time Out: 1455          Visit #: 6    Treatment Charges: Mins Units   Modalities: paraffin     Ther Exercise     Manual Therapy     Thera Activities 55 4   ADL/Home Mgt      Neuro Re-education     Group Therapy     Non-Billable Service Time: 15     Other     Total Time/Units 45 2       -Response to Treatment: Pt is eager to get better. Goals: Goals for pt can be seen on initial eval occurring on 10-28-21    Plan:   [x]  Continue Plan of care:  Re-eval at next visit. Pt education continues at each visit to obtain maximum benefits from skilled OT intervention.   []  Alter Plan of care:   []  Discharge:      Mary Morales, OT /L  [id number]

## 2021-12-16 ENCOUNTER — TREATMENT (OUTPATIENT)
Dept: OCCUPATIONAL THERAPY | Age: 51
End: 2021-12-16
Payer: COMMERCIAL

## 2021-12-16 DIAGNOSIS — I63.9 CEREBROVASCULAR ACCIDENT (CVA), UNSPECIFIED MECHANISM (HCC): Primary | ICD-10-CM

## 2021-12-16 PROCEDURE — 97530 THERAPEUTIC ACTIVITIES: CPT | Performed by: OCCUPATIONAL THERAPIST

## 2021-12-16 NOTE — PROGRESS NOTES
[facility] [address]  [facility] [address]  Dept: [address] OT Fax: [phone]    OCCUPATIONAL THERAPY   PROGRESS NOTE/ DISCHARGE NOTE    Date:  2021  Initial Evaluation Date: 10-28-21    Patient Name:  Megan Warren    :  1970    Restrictions/Precautions:  No lifting over 10#, Low fall risk, Hx anemia and RA  Diagnosis:  CVA  Unspecified (163.9)                                                            Date of Surgery/Injury: 86     Insurance/Certification information:  [address] of care signed (Y/N): N  Visit# / total visits:  recommended     Referring Practitioner:  Dr Karen Rodriguez  Specific Practitioner Orders: OT     Assessment of current deficits   []? Functional mobility             [x]? ADLs          [x]? Strength                  []? Cognition   []? Functional transfers           [x]? IADLs         []? Safety Awareness  [x]? Endurance   [x]? Fine Motor Coordination    []? Balance      [x]? Vision/perception   []? Sensation     [x]? Gross Motor Coordination []? ROM           []? Pain                        []? Edema          []? Scar Adhesion/Skin Integrity      OT PLAN OF CARE   OT POC based on physician orders, patient diagnosis and results of clinical assessment     Frequency/Duration: 2x/ week x 6 weeks  Specific OT Treatment to include:      [x]? Instruction in HEP                   Modalities:  [x]?  Therapeutic Exercise                 []? Ultrasound               []? Electrical Stimulation/Attended  []? PROM/Stretching                    []? Fluidotherapy          []?  Paraffin                   []? AAROM  [x]?  AROM                 []? Iontophoresis:   []? Tendon Angela Noel  []? Neuromuscular Re-Ed            []? ADL/IADL re-training    [x]?  Therapeutic Activity                  []? Pain Management with/without modalities PRN                 []? Manual Therapy   []? Splinting                                   []? Scar Management                   []?Joint Protection/Training  []? Ergonomics                             []? Joint Mobilization                      []? Adaptive Equipment Assessment/Training                             []? Manual Edema Mobilization   []? Myofascial Release                 [x]? Energy Conservation/Work Simplification  [x]? GM/FM Coordination                []? Safety retraining/education per  individual diagnosis/goals  []? Desensitization        Patient Specific Goal: \" To get my hand back to normal\".                           Kennedy Shoe (Long term same as Short term): as of 12-16-21  1) Patient will demonstrate good understanding of home program (exercises/activities/diagnosis/prognosis/goals) with good accuracy. (goal met)    2) Patient will demonstrate increased L UE strength to 4+/5  for ADL/IADL completion. (goal met as stated below)    3) Patient will demonstrate increased /pinch strength of at least 10  of their left hand. (goal met as stated below)    4) Increase in fine motor function as evidenced by decreased time to complete 9-hole peg test and/or MRMT test by at least 5-10 seconds.  (goal met as stated below)    5) Patient will report ADL functions as I with improved functional use of the left arm. (goal met- pt has returned to all daily activity with improved use of the left hand/ arm)     6) Patient will demonstrate improved functional activity tolerance from fair to good for ADL/IADL completion. (goal met- overall endurance has improved from fair to good for daily activity. However, she is encouraged to continue a home walking program. Pt is also to consider Flores Blanco at the [facility] program and returning to exercise with her .  Pt was cautioned to advance slowly and to not over do. )    7) Patient will decrease QuickDASH score to 15% or less for increased participation in daily functional activities. (good progress at 18%)       Pain Level: No pain reported  Subjective: \" I am doing better . \"  Objective:  Updated POC to be completed by 11-28-21. INTERVENTION: COMPLETED: SPECIFICS/COMMENTS:   Modality:     Paraffin Tx  10 min to help minimize hand pain and tightness from arthritis        Strengthening     Over shoulder ROM/ full arm  ex       x - ball up the wall roll 15x  - ball to wall circles  UBE 2 min forward/ 2 back  - OTD pulley   Hand strengthening  -3# red digiflex composite 2-20, each digit 2-10 each finger   Forearm/ wrist strengthening  - red azul bar ( 15x twist in/ twist out, bending palms down, bending palms up)        Coordination ex     In hand manipulation  - exercise balls in hand and on table/ getting better  -coin in hand manipulation/ 5 coins at a time   FM tasks  - peg designs with in hand manipulation/ better today  - xsmall screw assembly  - Purdue peg board assembly   Dexterity ex  -ASL ROM ex/ continue at home             Other:     Endurance      - Energy conservation information provided/ basics discussed- will review in more detail at next visit          Assessment/Comments: Pt is making Good progress toward stated plan of care. Pt is back and healing well from her recent hysterectomy. Status is updated today to show good progress in all areas.  Specifics are as follows:     L UE strength:  Shld 3+/5 to 4+/5  Elbow 3+ to 5/5  Wrist 5/5     Dynamometer (setting 2):                              Left: 36#  to 51# average                                             Right: 60#                     Pinch Meter:              Lateral: Left= 12#/ no change ,Right= 15#     9 Hole Peg Test:              Left: 39 sec to 23.8 sec                 QuickDASH Score: 29.5% disability to 17.5%                  -Rehab Potential: Good  -Requires OT Follow Up: Yes  Time In: 1415          Time Out: 1500          Visit #: 7    Treatment Charges: Mins Units   Modalities: paraffin     Ther Exercise     Manual Therapy     Thera Activities 45 3   ADL/Home Mgt      Neuro Re-education     Group Therapy     Non-Billable Service Time: 15     Other     Total Time/Units 45 3       -Response to Treatment: Pt feeling better. Goals: Goals for pt can be seen on initial eval occurring on 10-28-21    Plan:   []  Continue Plan of care: Pt education continues at each visit to obtain maximum benefits from skilled OT intervention.   []  Alter Plan of care:   [x]  Discharge: with Piyush Steinberg OT /L  [id number]

## 2022-01-05 ENCOUNTER — EVALUATION (OUTPATIENT)
Dept: SPEECH THERAPY | Age: 52
End: 2022-01-05
Payer: COMMERCIAL

## 2022-01-05 DIAGNOSIS — R41.841 COGNITIVE COMMUNICATION DEFICIT: Primary | ICD-10-CM

## 2022-01-05 PROCEDURE — 96125 COGNITIVE TEST BY HC PRO: CPT | Performed by: SPEECH-LANGUAGE PATHOLOGIST

## 2022-01-11 RX ORDER — ASPIRIN 81 MG/1
81 TABLET ORAL DAILY
COMMUNITY

## 2022-01-11 RX ORDER — CELECOXIB 200 MG/1
200 CAPSULE ORAL 2 TIMES DAILY
COMMUNITY
End: 2022-09-20

## 2022-01-11 NOTE — PROGRESS NOTES
[address] [facility]                                                                                                                    PRE OP INSTRUCTIONS FOR  Yady Ashraf        Date: 1/11/2022    Date of surgery: 1/12/22   Arrival Time: Hospital will call you between 5pm and 7pm with your final arrival time for surgery    1. Do not eat or drink anything after midnight prior to surgery. This includes no water, chewing gum, mints or ice chips. 2. Take the following medications with a small sip of water on the morning of Surgery:  none    3. Diabetics may take evening dose of insulin but none after midnight. If you feel symptomatic or low blood sugar morning of surgery drink 1-2 ounces of apple juice only. 4. Aspirin, Ibuprofen, Advil, Naproxen, Vitamin E and other Anti-inflammatory products should be stopped  before surgery  as directed by your physician. Take Tylenol only unless instructed otherwise by your surgeon. 5. Check with your Doctor regarding stopping Plavix, Coumadin, Lovenox, Eliquis, Effient, or other blood thinners. 6. Do not smoke,use illicit drugs and do not drink any alcoholic beverages 24 hours prior to surgery. 7. You may brush your teeth the morning of surgery. DO NOT SWALLOW WATER    8. You MUST make arrangements for a responsible adult to take you home after your surgery. You will not be allowed to leave alone or drive yourself home. It is strongly suggested someone stay with you the first 24 hrs. Your surgery will be cancelled if you do not have a ride home. 9. PEDIATRIC PATIENTS ONLY:  A parent/legal guardian must accompany a child scheduled for surgery and plan to stay at the hospital until the child is discharged. Please do not bring other children with you.     10. Please wear simple, loose fitting clothing to the hospital.  [facility] not bring valuables (money, credit cards, checkbooks, etc.) Do not wear any makeup (including no eye makeup) or nail polish on your fingers or toes. 11. DO NOT wear any jewelry or piercings on day of surgery. All body piercing jewelry must be removed. 12. Shower the night before surgery with _x__Antibacterial soap /MARIN WIPES________    13. TOTAL JOINT REPLACEMENT/HYSTERECTOMY PATIENTS ONLY---Remember to bring Blood Bank bracelet to the hospital on the day of surgery. 14. If you have a Living Will and Durable Power of  for Healthcare, please bring in a copy. 15. If appropriate bring crutches, inspirex, WALKER, CANE etc... 12. Notify your Surgeon if you develop any illness between now and surgery time, cough, cold, fever, sore throat, nausea, vomiting, etc.  Please notify your surgeon if you experience dizziness, shortness of breath or blurred vision between now & the time of your surgery. 17. If you have ___dentures, they will be removed before going to the OR; we will provide you a container. If you wear ___contact lenses or ___glasses, they will be removed; please bring a case for them. 18. To provide excellent care visitors will be limited to 1 in the room at any given time. 19. Please bring picture ID and insurance card. 20. Sleep apnea patients need to bring CPAP AND SETTINGS to hospital on day of surgery. 21. During flu season no children under the age of 15 are permitted in the hospital for the safety of all patients. 22. Other                 Please call AMBULATORY CARE if you have any further questions.    [facility]     [address]

## 2022-01-17 ENCOUNTER — ANESTHESIA EVENT (OUTPATIENT)
Dept: ENDOSCOPY | Age: 52
End: 2022-01-17
Payer: COMMERCIAL

## 2022-01-17 ENCOUNTER — ANESTHESIA (OUTPATIENT)
Dept: ENDOSCOPY | Age: 52
End: 2022-01-17
Payer: COMMERCIAL

## 2022-01-17 ENCOUNTER — HOSPITAL ENCOUNTER (OUTPATIENT)
Age: 52
Setting detail: OUTPATIENT SURGERY
Discharge: HOME | End: 2022-01-17
Attending: INTERNAL MEDICINE
Payer: COMMERCIAL

## 2022-01-17 VITALS
HEART RATE: 66 BPM | BODY MASS INDEX: 33.75 KG/M2 | TEMPERATURE: 97.1 F | DIASTOLIC BLOOD PRESSURE: 75 MMHG | SYSTOLIC BLOOD PRESSURE: 123 MMHG | RESPIRATION RATE: 16 BRPM | OXYGEN SATURATION: 100 % | HEIGHT: 66 IN | WEIGHT: 210 LBS

## 2022-01-17 VITALS — OXYGEN SATURATION: 100 % | DIASTOLIC BLOOD PRESSURE: 111 MMHG | SYSTOLIC BLOOD PRESSURE: 155 MMHG

## 2022-01-17 LAB
LV EF: 58 %
LVEF MODALITY: NORMAL

## 2022-01-17 PROCEDURE — 2580000003 HC RX 258: Performed by: ANESTHESIOLOGY

## 2022-01-17 PROCEDURE — 99219 PR INITIAL OBSERVATION CARE/DAY 50 MINUTES: CPT | Performed by: INTERNAL MEDICINE

## 2022-01-17 PROCEDURE — 7100000011 HC PHASE II RECOVERY - ADDTL 15 MIN: Performed by: INTERNAL MEDICINE

## 2022-01-17 PROCEDURE — 6360000002 HC RX W HCPCS: Performed by: NURSE ANESTHETIST, CERTIFIED REGISTERED

## 2022-01-17 PROCEDURE — 7100000010 HC PHASE II RECOVERY - FIRST 15 MIN: Performed by: INTERNAL MEDICINE

## 2022-01-17 PROCEDURE — 3700000000 HC ANESTHESIA ATTENDED CARE: Performed by: INTERNAL MEDICINE

## 2022-01-17 PROCEDURE — 93312 ECHO TRANSESOPHAGEAL: CPT

## 2022-01-17 PROCEDURE — 2709999900 HC NON-CHARGEABLE SUPPLY: Performed by: INTERNAL MEDICINE

## 2022-01-17 PROCEDURE — 93312 ECHO TRANSESOPHAGEAL: CPT | Performed by: INTERNAL MEDICINE

## 2022-01-17 PROCEDURE — 3700000001 HC ADD 15 MINUTES (ANESTHESIA): Performed by: INTERNAL MEDICINE

## 2022-01-17 PROCEDURE — 93325 DOPPLER ECHO COLOR FLOW MAPG: CPT

## 2022-01-17 PROCEDURE — 2580000003 HC RX 258: Performed by: INTERNAL MEDICINE

## 2022-01-17 RX ORDER — LANOLIN ALCOHOL/MO/W.PET/CERES
50 CREAM (GRAM) TOPICAL DAILY
COMMUNITY
End: 2022-09-20

## 2022-01-17 RX ORDER — PROPOFOL 10 MG/ML
INJECTION, EMULSION INTRAVENOUS PRN
Status: DISCONTINUED | OUTPATIENT
Start: 2022-01-17 | End: 2022-01-17 | Stop reason: SDUPTHER

## 2022-01-17 RX ORDER — SODIUM CHLORIDE 0.9 % (FLUSH) 0.9 %
SYRINGE (ML) INJECTION PRN
Status: DISCONTINUED | OUTPATIENT
Start: 2022-01-17 | End: 2022-01-17 | Stop reason: ALTCHOICE

## 2022-01-17 RX ORDER — SODIUM CHLORIDE, SODIUM LACTATE, POTASSIUM CHLORIDE, CALCIUM CHLORIDE 600; 310; 30; 20 MG/100ML; MG/100ML; MG/100ML; MG/100ML
INJECTION, SOLUTION INTRAVENOUS CONTINUOUS
Status: DISCONTINUED | OUTPATIENT
Start: 2022-01-17 | End: 2022-01-17 | Stop reason: HOSPADM

## 2022-01-17 RX ORDER — GABAPENTIN 100 MG/1
100 CAPSULE ORAL 3 TIMES DAILY
COMMUNITY
End: 2022-09-20

## 2022-01-17 RX ADMIN — PROPOFOL 50 MG: 10 INJECTION, EMULSION INTRAVENOUS at 13:27

## 2022-01-17 RX ADMIN — PROPOFOL 50 MG: 10 INJECTION, EMULSION INTRAVENOUS at 13:28

## 2022-01-17 RX ADMIN — SODIUM CHLORIDE, POTASSIUM CHLORIDE, SODIUM LACTATE AND CALCIUM CHLORIDE: 600; 310; 30; 20 INJECTION, SOLUTION INTRAVENOUS at 12:30

## 2022-01-17 RX ADMIN — PROPOFOL 50 MG: 10 INJECTION, EMULSION INTRAVENOUS at 13:31

## 2022-01-17 RX ADMIN — PROPOFOL 50 MG: 10 INJECTION, EMULSION INTRAVENOUS at 13:26

## 2022-01-17 RX ADMIN — PROPOFOL 50 MG: 10 INJECTION, EMULSION INTRAVENOUS at 13:25

## 2022-01-17 RX ADMIN — PROPOFOL 50 MG: 10 INJECTION, EMULSION INTRAVENOUS at 13:34

## 2022-01-17 ASSESSMENT — PAIN SCALES - GENERAL: PAINLEVEL_OUTOF10: 0

## 2022-01-17 ASSESSMENT — PAIN - FUNCTIONAL ASSESSMENT: PAIN_FUNCTIONAL_ASSESSMENT: 0-10

## 2022-01-17 NOTE — ANESTHESIA POSTPROCEDURE EVALUATION
Department of Anesthesiology  Postprocedure Note    Patient: Nadiya Bishop  MRN: [id number]  YOB: 1970  Date of evaluation: 1/17/2022  Time:  1:56 PM     Procedure Summary     Date: 01/17/22 Room / Location: [address] [place] [address] / [address] [place] [address]    Anesthesia Start: 5008 Anesthesia Stop: 1968    Procedure: TRANSESOPHAGEAL ECHOCARDIOGRAM WITH BUBBLE STUDY (N/A ) Diagnosis: (PFO)    Surgeons: Shaheed Stevens MD Responsible Provider: Mary Bridges MD    Anesthesia Type: MAC ASA Status: 2          Anesthesia Type: MAC    Grazyna Phase I: Grazyna Score: 10    Grazyna Phase II:      Last vitals: Reviewed and per EMR flowsheets.        Anesthesia Post Evaluation    Patient location during evaluation: bedside  Patient participation: complete - patient participated  Level of consciousness: awake  Pain score: 2  Airway patency: patent  Nausea & Vomiting: no nausea  Complications: no  Cardiovascular status: blood pressure returned to baseline  Respiratory status: acceptable  Hydration status: euvolemic

## 2022-01-17 NOTE — H&P
Group Topic: BH Relapse Prevention Education     Date: May 19  Start Time:  2:00 PM  End Time:  2:45 PM    Focus: Relapse Prevention / Check Out   Number in attendance: 8 ( 2 Residents)    Participation: Active  Patient Response: Attentive    MAURICE Soto      History and physical examination    Name: Livia Jeffery    Age: 50 y.o. Date of Admission: 1/17/2022 11:48 AM    Date of Service: 1/17/2022      No chief complaint on file. Referring Physician: No admitting provider for patient encounter. History of Present Illness: 82-year-old female with history of stroke who presented for NICOLAS for further evaluation. Patient is not known to cardiology.         Review of Systems:   Cardiac: As per HPI  General: Denies fever or chills  Pulmonary: As per HPI  HEENT: Denies runny nose  GI: No complaints  : No complaints  Endocrine: Denies night sweats  Musculoskeletal: No complaints  Skin: Dry skin  Neuro: No complaints  Psych: Denies depression    Past Medical History:  Past Medical History:   Diagnosis Date    Anemia     Arthritis     rheumatoid     Cerebral artery occlusion with cerebral infarction ([facility] [address]      left hand feels heavy    History of blood transfusion     x5    Hx of blood clots     vaginal    Menometrorrhagia 11/17/2021       Past Surgical History:  Past Surgical History:   Procedure Laterality Date    CARPAL TUNNEL RELEASE Left 02/12/2021    Left Carpal Tunnel Release    CARPAL TUNNEL RELEASE Left 2/12/2021    LEFT CARPAL TUNNEL RELEASE performed by Maximilian Stern DO at [facility] N/A 11/17/2021    DILATATION AND CURETTAGE HYSTEROSCOPY performed by Josephine Almazan MD at [place] [address] Bilateral 12/1/2021    ROBOTIC XI ASSISTED TOTAL LAPAROSCOPIC HYSTERECTOMY WITH BILATERAL SALPINGECTOMY performed by Josephine Almazan MD at [place] [address]  2005       Family History:  Family History   Problem Relation Age of Onset    Other Mother     Arthritis Father     Diabetes Father     Other Father     Breast Cancer Paternal Aunt        Social History:  Social History     Socioeconomic History    Marital status: Single     Spouse name: Not on file    Number of children: Not on file    Years of education: 25    Highest education level: Not on file   Occupational History    Occupation:      Comment: [address] Use    Smoking status: Never Smoker    Smokeless tobacco: Never Used   Vaping Use    Vaping Use: Never used   Substance and Sexual Activity    Alcohol use: Yes     Comment: social    Drug use: No    Sexual activity: Yes     Partners: Male   Other Topics Concern    Not on file   Social History Narrative    Not on file     Social Determinants of Health     Financial Resource Strain:     Difficulty of Paying Living Expenses: Not on file   Food Insecurity:     Worried About Running Out of Food in the Last Year: Not on file    [place] of Food in the Last Year: Not on file   Transportation Needs:     Lack of Transportation (Medical): Not on file    Lack of Transportation (Non-Medical): Not on file   Physical Activity:     Days of Exercise per Week: Not on file    Minutes of Exercise per Session: Not on file   Stress:     Feeling of Stress : Not on file   Social Connections:     Frequency of Communication with Friends and Family: Not on file    Frequency of Social Gatherings with Friends and Family: Not on file    Attends Religion Services: Not on file    Active Member of [address] [place] [address] or Organizations: Not on file    Attends Club or Organization Meetings: Not on file    Marital Status: Not on file   Intimate Partner Violence:     Fear of Current or Ex-Partner: Not on file    Emotionally Abused: Not on file    Physically Abused: Not on file    Sexually Abused: Not on file   Housing Stability:     Unable to Pay for Housing in the Last Year: Not on file    Number of [place] in the Last Year: Not on file    Unstable Housing in the Last Year: Not on file       Allergies:   Allergies   Allergen Reactions    Amoxicillin Itching and Other (See Comments)     Urine tract       Home Medications:  Prior to Admission medications Medication Sig Start Date End Date Taking? Authorizing Provider   vitamin B-6 (PYRIDOXINE) 50 MG tablet Take 50 mg by mouth daily   Yes Historical Provider, MD   gabapentin (NEURONTIN) 100 MG capsule Take 100 mg by mouth 3 times daily.    Yes Historical Provider, MD   celecoxib (CELEBREX) 200 MG capsule Take 200 mg by mouth 2 times daily   Yes Historical Provider, MD   aspirin 81 MG EC tablet Take 81 mg by mouth daily   Yes Historical Provider, MD   folic acid (FOLVITE) 199 MCG tablet Take 800 mcg by mouth daily   Yes Historical Provider, MD   Multiple Vitamins-Minerals (MULTIVITAMIN ADULT) CHEW Take 1 tablet by mouth daily   Yes Historical Provider, MD   Omega-3 Fatty Acids (FISH OIL) 1000 MG CAPS Take 1,000 mg by mouth daily   Yes Historical Provider, MD   atorvastatin (LIPITOR) 80 MG tablet Take 80 mg by mouth nightly   Yes Historical Provider, MD   ferrous sulfate (IRON 325) 325 (65 Fe) MG tablet Take 325 mg by mouth 2 times daily    Yes Historical Provider, MD   NIACIN PO Take 1 tablet by mouth daily    Yes Historical Provider, MD   vitamin B-1 (THIAMINE) 100 MG tablet Take 100 mg by mouth daily   Yes Historical Provider, MD   Vitamin D (CHOLECALCIFEROL) 25 MCG (1000 UT) TABS tablet Take 1,000 Units by mouth daily   Yes Historical Provider, MD   methotrexate (RHEUMATREX) 2.5 MG chemo tablet Take 20 mg by mouth once a week Friday (8 Tablets)   Yes Historical Provider, MD       Current Medications:  Current Facility-Administered Medications   Medication Dose Route Frequency Provider Last Rate Last Admin    lactated ringers infusion   IntraVENous Continuous Billy Fisher MD 10 mL/hr at 01/17/22 1242 NoRateChange at 01/17/22 1242      lactated ringers 10 mL/hr at 01/17/22 1242       Physical Exam:  /70   Pulse 71   Temp 97.5 °F (36.4 °C) (Infrared)   Resp 16   Ht 5' 6\" (1.676 m)   Wt 210 lb (95.3 kg)   LMP 09/30/2021   SpO2 97%   BMI 33.89 kg/m²   Wt Readings from Last 3 Encounters:   01/17/22 210 lb (95.3 kg)   11/30/21 207 lb (93.9 kg)   11/16/21 213 lb 8 oz (96.8 kg)       Appearance: Alert and oriented x3 not in acute distress. Skin: Dry skin  Head: Atraumatic  Eyes: Intact extraocular muscles   ENMT: Mucous membranes are moist  Neck: Supple  Lungs: Clear to auscultation  Cardiac: Normal S1 and S2  Abdomen: Protuberant  Extremities: Intact range of motion  Neurologic: No focal neurological deficits  Peripheral Pulses: 2+ peripheral pulses    Intake/Output:  No intake or output data in the 24 hours ending 01/17/22 1316  No intake/output data recorded. Laboratory Tests:  No results for input(s): NA, K, CL, CO2, BUN, CREATININE, GLUCOSE, CALCIUM in the last 72 hours. Lab Results   Component Value Date    MG 2.1 11/14/2021    MG 2.0 10/21/2021     No results for input(s): ALKPHOS, ALT, AST, PROT, BILITOT, BILIDIR, LABALBU in the last 72 hours. No results for input(s): WBC, RBC, HGB, HCT, MCV, MCH, MCHC, RDW, PLT, MPV in the last 72 hours. No results found for: CKTOTAL, CKMB, CKMBINDEX, TROPONINI  No results for input(s): CKTOTAL, CKMB, CKMBINDEX, TROPHS in the last 72 hours. No results found for: INR, PROTIME  No results found for: TSHFT4, TSH  No results found for: LABA1C  No results found for: EAG  No results found for: CHOL  No results found for: TRIG  Lab Results   Component Value Date    HDL 70 01/19/2019     Lab Results   Component Value Date    LDLCALC 143 (H) 01/19/2019     Lab Results   Component Value Date    LABVLDL 12 01/19/2019     No results found for: CHOLHDLRATIO  No results for input(s): PROBNP in the last 72 hours. CXR reviewed: The ASCVD Risk score (Melvi Langley et al., 2013) failed to calculate for the following reasons: The patient has a prior MI or stroke diagnosis    ASSESSMENT / PLAN:    We will proceed with NICOLAS for evaluation of recent stroke          Thank you for allowing me to participate in your patient's care.  Please feel free to contact me if you have any questions or concerns.     Willow Villasenor MD  [facility] [place] ([facility]) Cardiology

## 2022-01-17 NOTE — ANESTHESIA PRE PROCEDURE
Department of Anesthesiology  Preprocedure Note       Name:  Lianet Cuadra   Age:  50 y.o.  :  1970                                          MRN:  [id number]         Date:  2022      Surgeon: Jaycob Cardona):  Javier Fenton MD    Procedure: Procedure(s):  TRANSESOPHAGEAL ECHOCARDIOGRAM    Medications prior to admission:   Prior to Admission medications    Medication Sig Start Date End Date Taking? Authorizing Provider   vitamin B-6 (PYRIDOXINE) 50 MG tablet Take 50 mg by mouth daily   Yes Historical Provider, MD   gabapentin (NEURONTIN) 100 MG capsule Take 100 mg by mouth 3 times daily.    Yes Historical Provider, MD   celecoxib (CELEBREX) 200 MG capsule Take 200 mg by mouth 2 times daily   Yes Historical Provider, MD   aspirin 81 MG EC tablet Take 81 mg by mouth daily   Yes Historical Provider, MD   folic acid (FOLVITE) 549 MCG tablet Take 800 mcg by mouth daily   Yes Historical Provider, MD   Multiple Vitamins-Minerals (MULTIVITAMIN ADULT) CHEW Take 1 tablet by mouth daily   Yes Historical Provider, MD   Omega-3 Fatty Acids (FISH OIL) 1000 MG CAPS Take 1,000 mg by mouth daily   Yes Historical Provider, MD   atorvastatin (LIPITOR) 80 MG tablet Take 80 mg by mouth nightly   Yes Historical Provider, MD   ferrous sulfate (IRON 325) 325 (65 Fe) MG tablet Take 325 mg by mouth 2 times daily    Yes Historical Provider, MD   NIACIN PO Take 1 tablet by mouth daily    Yes Historical Provider, MD   vitamin B-1 (THIAMINE) 100 MG tablet Take 100 mg by mouth daily   Yes Historical Provider, MD   Vitamin D (CHOLECALCIFEROL) 25 MCG (1000 UT) TABS tablet Take 1,000 Units by mouth daily   Yes Historical Provider, MD   methotrexate (RHEUMATREX) 2.5 MG chemo tablet Take 20 mg by mouth once a week Friday (8 Tablets)   Yes Historical Provider, MD       Current medications:    Current Facility-Administered Medications   Medication Dose Route Frequency Provider Last Rate Last Admin    lactated ringers infusion IntraVENous Continuous Edilberto Spears MD 10 mL/hr at 01/17/22 1242 NoRateChange at 01/17/22 1242       Allergies:     Allergies   Allergen Reactions    Amoxicillin Itching and Other (See Comments)     Urine tract       Problem List:    Patient Active Problem List   Diagnosis Code    Left carpal tunnel syndrome G56.02    Vaginal bleeding N93.9    Uterine hypertrophy N85.2    Acute blood loss anemia D62    Encounter for blood transfusion Z51.89    Cerebrovascular accident (CVA) ([facility] [address] I63.9    Menometrorrhagia N92.1       Past Medical History:        Diagnosis Date    Anemia     Arthritis     rheumatoid     Cerebral artery occlusion with cerebral infarction ([facility] [address]      left hand feels heavy    History of blood transfusion     x5    Hx of blood clots     vaginal    Menometrorrhagia 11/17/2021       Past Surgical History:        Procedure Laterality Date    CARPAL TUNNEL RELEASE Left 02/12/2021    Left Carpal Tunnel Release    CARPAL TUNNEL RELEASE Left 2/12/2021    LEFT CARPAL TUNNEL RELEASE performed by Sena Dominguez DO at [facility] N/A 11/17/2021    DILATATION AND CURETTAGE HYSTEROSCOPY performed by Fredo Vizcarra MD at [place] [address] Bilateral 12/1/2021    ROBOTIC XI ASSISTED TOTAL LAPAROSCOPIC HYSTERECTOMY WITH BILATERAL SALPINGECTOMY performed by Fredo Vizcarra MD at [address] [place] [address]  2005       Social History:    Social History     Tobacco Use    Smoking status: Never Smoker    Smokeless tobacco: Never Used   Substance Use Topics    Alcohol use: Yes     Comment: social                                Counseling given: Not Answered      Vital Signs (Current):   Vitals:    01/11/22 0948 01/17/22 1154   BP:  122/70   Pulse:  71   Resp:  16   Temp:  97.5 °F (36.4 °C)   TempSrc:  Infrared   SpO2:  97%   Weight: 205 lb (93 kg) 210 lb (95.3 kg)   Height: 5' 6\" (1.676 m) 5' 6\" (1.676 m)                                              BP Readings from Last 3 Encounters:   01/17/22 122/70   12/02/21 134/69   12/01/21 (!) 188/118       NPO Status: Time of last liquid consumption: 2330                        Time of last solid consumption: 2100                        Date of last liquid consumption: 01/16/22                        Date of last solid food consumption: 01/16/22    BMI:   Wt Readings from Last 3 Encounters:   01/17/22 210 lb (95.3 kg)   11/30/21 207 lb (93.9 kg)   11/16/21 213 lb 8 oz (96.8 kg)     Body mass index is 33.89 kg/m². CBC:   Lab Results   Component Value Date    WBC 5.3 11/30/2021    RBC 3.33 11/30/2021    HGB 9.0 12/02/2021    HCT 28.8 12/02/2021    MCV 95.2 11/30/2021    RDW 15.3 11/30/2021     11/30/2021       CMP:   Lab Results   Component Value Date     11/14/2021    K 3.4 11/14/2021     11/14/2021    CO2 21 11/14/2021    BUN 7 11/14/2021    CREATININE 0.8 11/14/2021    GFRAA >60 11/14/2021    LABGLOM >60 11/14/2021    GLUCOSE 123 11/14/2021    PROT 6.4 11/14/2021    CALCIUM 8.3 11/14/2021    BILITOT 0.6 11/14/2021    ALKPHOS 93 11/14/2021    AST 27 11/14/2021    ALT 29 11/14/2021       POC Tests: No results for input(s): POCGLU, POCNA, POCK, POCCL, POCBUN, POCHEMO, POCHCT in the last 72 hours.     Coags:   Lab Results   Component Value Date    APTT 30.0 10/04/2021       HCG (If Applicable):   Lab Results   Component Value Date    PREGTESTUR NEGATIVE 11/30/2021        ABGs: No results found for: PHART, PO2ART, LXG0TEH, LDS5KYN, BEART, U9ZHPMWU     Type & Screen (If Applicable):  No results found for: LABABO, LABRH    Drug/Infectious Status (If Applicable):  No results found for: HIV, HEPCAB    COVID-19 Screening (If Applicable):   Lab Results   Component Value Date    COVID19 Not Detected 02/05/2021           Anesthesia Evaluation  Patient summary reviewed  Airway: Mallampati: II  TM distance: >3 FB   Neck ROM: full  Mouth opening: > = 3 FB Dental: normal exam         Pulmonary:Negative Pulmonary ROS breath sounds clear to auscultation                             Cardiovascular:Negative CV ROS            Rhythm: regular  Rate: normal                    Neuro/Psych:   (+) CVA:, neuromuscular disease:,             GI/Hepatic/Renal: Neg GI/Hepatic/Renal ROS            Endo/Other: Negative Endo/Other ROS   (+) : arthritis: rheumatoid. , .                 Abdominal:   (+) obese,     Abdomen: soft. Vascular: Other Findings:             Anesthesia Plan      MAC     ASA 2       Induction: intravenous. Anesthetic plan and risks discussed with patient. Plan discussed with CRNA.                   Valdemar Anderson MD   1/17/2022

## 2022-01-18 NOTE — PROGRESS NOTES
[address] [facility]  Outpatient Speech Therapy  Phone: [phone]   Fax:  [phone]        SPEECH/LANGUAGE PATHOLOGY  ROSS INFORMATION PROCESSING ASSESSMENT-2 (RIPA-2)   EVALUATION RESULTS and PLAN OF CARE    PATIENT NAME:  Shawanda Frazier  (female)     MRN:  [id number]    :  1970  (50 y.o.)  STATUS:  Outpatient clinic   TODAY'S DATE:  2022  REFERRING PROVIDER:    Dr. Arzola: Consult/Referral to Speech Language Pathology/  Date of order:  2021  REASON FOR REFERRAL:  Right middle cerebral artery stroke  EVALUATING THERAPIST: NESHA Toscano  CERTIFICATION/RECERTIFICATION PERIOD: 2022 to 22  REFERRING DIAGNOSIS: Right middle cerebral artery stroke    ADMITTING DIAGNOSIS: Right middle cerebral artery stroke    VISIT DIAGNOSIS: Cognitive communication deficit    SPEECH THERAPY  PLAN OF CARE   The speech therapy  POC is established based on physician order, speech pathology diagnosis and results of clinical assessment     SPEECH PATHOLOGY DIAGNOSIS:    Moderate Cognitive Communication Deficit    Speech Pathology intervention is recommended 1-2 times per week for 6-12 weeks or when goals are met with emphasis on the following:      Conditions Requiring Skilled Therapeutic Intervention for speech, language and/or cognition    Anomia  Dysarthria   Decreased short term memory  Decreased thought organization    Specific Speech Therapy Interventions to Include: Therapeutic exercises  Expressive language training   Therapeutic tasks for Cognition    Specific instructions for next treatment:     To initiate POC  To initiate therapeutic exercises  To initiate memory tasks  To initiate thought organization tasks    SHORT/LONG TERM GOALS  Pt will improve immediate, short term, recent memory during structured and unstructured tasks with 90% accuracy   Pt will improve thought organization during structured and unstructured tasks with 90% accuracy   Pt will improve word finding with sentence level and open ended conversation through incorporation of preparatory and circumlocution strategies 90% accuracy  Pt will improve speech intelligibility and increased articulatory precision via compensatory strategies and oral motor exercises     Patient goals: Patient/family involved in developing goals and treatment plan:   Treatment goals discussed with Patient    The Patient understand(s) the diagnosis, prognosis and plan of care   The patient/family Agreed with above,     This plan may be re-evaluated and revised as warranted. Rehabilitation Potential/Prognosis: good                           Slava Sanabria reports that she suffered a CVA on 10/06/2021. She has already completed physical and occupational therapy but this is her first session of speech therapy. She states that she experiences slurred speech, especially when fatigued, periods of anomia and difficulty with short term memory. 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 Stimulus questions were obtained from the RIPA-2.   There are 30 possible points for each subtest.   Severity ratings for each subtest were determined as follows:     RAW SCORE  SEVERITY    28-30  Within functional limits (WFL)    25-27  Mild deficit    22-24  Moderate deficit    19-21  Marked deficit    16-18 Severe deficit   Below 16 Profound deficit         Subtest  Raw Score /Severity    Immediate Memory  22/30 MODERATE   Recent Memory   29/30 WFL   Temporal Orientation   (Recent Memory)   30/30 WFL   Temporal Orientation   (Remote Memory)   30/30 WFL   Spatial Orientation   30/30 WFL   Orientation to Environment   30/30 WFL   Recall of General Information   24/30 MODERATE   Problem Solving & Abstract Reasoning   30/30 WFL   Organization   27/30 MILD   Auditory Processing   & Retention   30/30 WFL     VARIANT OBSERVATIONS PRESENT DURING THE EVALATION:   Errors  Delayed response  Partially correct  Self corrected                             EDUCATION:   The Speech Language Pathologist (SLP) completed education regarding results of evaluation and that intervention is warranted at this time. Learner: Patient  Education: Reviewed results and recommendations of this evaluation  Evaluation of Education:  Josselyn Mcqueen understanding    This plan may be re-evaluated and revised as warranted. Treatment goals discussed with Patient   The Patient understand(s) the diagnosis, prognosis and plan of care     Evaluation Time includes thorough review of current medical information, gathering information on past medical history/social history and prior level of function, completion of standardized testing/informal observation of tasks, assessment of data and education on plan of care and goals. CPT Codes    Evaluation: [id number]  standardized cognitive performance testing (per hour)  Treatment: 13940  therapeutic interventions that focus on cognitive function , initial  15 min  [id number]  therapeutic interventions that focus on cognitive function, each additional 15 min    The admitting diagnosis and active problem list, as listed below have been reviewed prior to initiation of this evaluation. ACTIVE PROBLEM LIST:   Patient Active Problem List   Diagnosis    Left carpal tunnel syndrome    Vaginal bleeding    Uterine hypertrophy    Acute blood loss anemia    Encounter for blood transfusion    [facility] accident ([facility]A) ([facility] [address]    Menometrorrhagia       Yuri Pathak. Dev Huerta MA/CCC-SLP  [address]           Phone: [phone]       If you have any questions or concerns, please don't hesitate to call.   Thank you for your referral.    Physician/Provider Signature:________________________________Date:__________________  By signing above, the therapists plan is approved by the physician/provider. All patients under [place] must be signed by physician/provider.

## 2022-01-21 ENCOUNTER — TELEPHONE (OUTPATIENT)
Dept: CARDIOLOGY CLINIC | Age: 52
End: 2022-01-21

## 2022-01-21 NOTE — TELEPHONE ENCOUNTER
Patient returned call and was given results per Dr. Luis Miguel Gentile. She verbalized understanding.

## 2022-01-21 NOTE — TELEPHONE ENCOUNTER
Left message for patient to contact office. ----- Message from Javier Fenton MD sent at 1/21/2022  8:43 AM EST -----  Heart is pumping fine. Details will be discussed during follow-up visit.

## 2022-02-01 ENCOUNTER — OFFICE VISIT (OUTPATIENT)
Dept: ORTHOPEDIC SURGERY | Age: 52
End: 2022-02-01
Payer: COMMERCIAL

## 2022-02-01 VITALS — WEIGHT: 210 LBS | HEIGHT: 66 IN | BODY MASS INDEX: 33.75 KG/M2

## 2022-02-01 DIAGNOSIS — G56.01 RIGHT CARPAL TUNNEL SYNDROME: Primary | ICD-10-CM

## 2022-02-01 PROCEDURE — 99213 OFFICE O/P EST LOW 20 MIN: CPT | Performed by: ORTHOPAEDIC SURGERY

## 2022-02-01 PROCEDURE — 20526 THER INJECTION CARP TUNNEL: CPT | Performed by: ORTHOPAEDIC SURGERY

## 2022-02-01 RX ORDER — ETODOLAC 200 MG/1
200 CAPSULE ORAL EVERY 8 HOURS
COMMUNITY
End: 2022-09-20

## 2022-02-01 RX ORDER — TRIAMCINOLONE ACETONIDE 40 MG/ML
40 INJECTION, SUSPENSION INTRA-ARTICULAR; INTRAMUSCULAR ONCE
Status: COMPLETED | OUTPATIENT
Start: 2022-02-01 | End: 2022-02-01

## 2022-02-01 RX ADMIN — TRIAMCINOLONE ACETONIDE 40 MG: 40 INJECTION, SUSPENSION INTRA-ARTICULAR; INTRAMUSCULAR at 15:38

## 2022-02-01 NOTE — PROGRESS NOTES
Chief Complaint   Patient presents with    Hand Pain     Right hand carpal tunnel. She would like to discuss possible cortisone injection. Wants to schedule surgery around June. Isabel Zamudio is a 50y.o. year old who presents for follow up of right wrist pain. +CTS  The pain is located mainly 1-3. The pain level is 7/10. Past Medical History:   Diagnosis Date    Anemia     Arthritis     rheumatoid     Cerebral artery occlusion with cerebral infarction ([address]      left hand feels heavy    History of blood transfusion     x5    Hx of blood clots     vaginal    Menometrorrhagia 11/17/2021     Past Surgical History:   Procedure Laterality Date    CARPAL TUNNEL RELEASE Left 02/12/2021    Left Carpal Tunnel Release    CARPAL TUNNEL RELEASE Left 2/12/2021    LEFT CARPAL TUNNEL RELEASE performed by Rosalba Farrell DO at [facility] N/A 11/17/2021    DILATATION AND CURETTAGE HYSTEROSCOPY performed by Melissa Chapman MD at [address] [place] [address] Road Bilateral 12/1/2021    ROBOTIC XI ASSISTED TOTAL LAPAROSCOPIC HYSTERECTOMY WITH BILATERAL SALPINGECTOMY performed by Melissa Chapman MD at [place] [address]  2005    TRANSESOPHAGEAL ECHOCARDIOGRAM N/A 1/17/2022    TRANSESOPHAGEAL ECHOCARDIOGRAM WITH BUBBLE STUDY performed by Kate Enciso MD at [facility] ENDOSCOPY       Current Outpatient Medications:     etodolac (LODINE) 200 MG capsule, Take 200 mg by mouth every 8 hours, Disp: , Rfl:     vitamin B-6 (PYRIDOXINE) 50 MG tablet, Take 50 mg by mouth daily, Disp: , Rfl:     gabapentin (NEURONTIN) 100 MG capsule, Take 100 mg by mouth 3 times daily. , Disp: , Rfl:     celecoxib (CELEBREX) 200 MG capsule, Take 200 mg by mouth 2 times daily, Disp: , Rfl:     aspirin 81 MG EC tablet, Take 81 mg by mouth daily, Disp: , Rfl:     folic acid (FOLVITE) 566 MCG tablet, Take 800 mcg by mouth daily, Disp: , Rfl:     Multiple Vitamins-Minerals (MULTIVITAMIN ADULT) CHEW, Take 1 tablet by mouth daily, Disp: , Rfl:     Omega-3 Fatty Acids (FISH OIL) 1000 MG CAPS, Take 1,000 mg by mouth daily, Disp: , Rfl:     atorvastatin (LIPITOR) 80 MG tablet, Take 80 mg by mouth nightly, Disp: , Rfl:     ferrous sulfate (IRON 325) 325 (65 Fe) MG tablet, Take 325 mg by mouth 2 times daily , Disp: , Rfl:     NIACIN PO, Take 1 tablet by mouth daily , Disp: , Rfl:     vitamin B-1 (THIAMINE) 100 MG tablet, Take 100 mg by mouth daily, Disp: , Rfl:     Vitamin D (CHOLECALCIFEROL) 25 MCG (1000 UT) TABS tablet, Take 1,000 Units by mouth daily, Disp: , Rfl:     methotrexate (RHEUMATREX) 2.5 MG chemo tablet, Take 20 mg by mouth once a week Friday (8 Tablets), Disp: , Rfl:   Allergies   Allergen Reactions    Amoxicillin Itching and Other (See Comments)     Urine tract     Social History     Socioeconomic History    Marital status: Single     Spouse name: Not on file    Number of children: Not on file    Years of education: 25    Highest education level: Not on file   Occupational History    Occupation:      Comment: [employer]   Tobacco Use    Smoking status: Never Smoker    Smokeless tobacco: Never Used   Vaping Use    Vaping Use: Never used   Substance and Sexual Activity    Alcohol use: Yes     Comment: social    Drug use: No    Sexual activity: Yes     Partners: Male   Other Topics Concern    Not on file   Social History Narrative    Not on file     Social Determinants of Health     Financial Resource Strain:     Difficulty of Paying Living Expenses: Not on file   Food Insecurity:     Worried About Running Out of Food in the Last Year: Not on file    [place]an of Food in the Last Year: Not on file   Transportation Needs:     Lack of Transportation (Medical): Not on file    Lack of Transportation (Non-Medical):  Not on file   Physical Activity:     Days of Exercise per Week: Not on file    Minutes of Exercise per Session: Not on file Stress:     Feeling of Stress : Not on file   Social Connections:     Frequency of Communication with Friends and Family: Not on file    Frequency of Social Gatherings with Friends and Family: Not on file    Attends [employer] Services: Not on file    Active Member of [address] [place] [address] [employer] or Organizations: Not on file    Attends Club or Organization Meetings: Not on file    Marital Status: Not on file   Intimate Partner Violence:     Fear of Current or Ex-Partner: Not on file    Emotionally Abused: Not on file    Physically Abused: Not on file    Sexually Abused: Not on file   Housing Stability:     Unable to Pay for Housing in the Last Year: Not on file    Number of [place] in the Last Year: Not on file    Unstable Housing in the Last Year: Not on file     Family History   Problem Relation Age of Onset    Other Mother     Arthritis Father     Diabetes Father     Other Father     Breast Cancer Paternal Aunt        REVIEW OF SYSTEMS:    General/Constitution:  (-)weight loss, (-)fever, (-)chills, (-)weakness. Skin: (-) rash,(-) psoriasis,(-) eczema, (-)skin cancer. Musculoskeletal: (-) fractures,  (-) dislocations,(-) collagen vascular disease, (-) fibromyalgia, (-) multiple sclerosis, (-) muscular dystrophy, (-) RSD,(-) joint pain (-)swelling, (-) joint pain,swelling. Neurologic: (-) epilepsy, (-)seizures,(-) brain tumor,(-) TIA, (-)stroke, (-)headaches, (-)Parkinson disease,(-) memory loss, (-) LOC. Cardiovascular: (-) Chest pain, (-) swelling in legs/feet, (-) SOB, (-) cramping in legs/feet with walking. Respiratory: (-) SOB, (-) Coughing, (-) night sweats. GI: (-) nausea, (-) vomiting, (-) diarrhea, (-) blood in stool, (-) gastric ulcer. Psychiatric: (-) Depression, (-) Anxiety, (-) bipolar disease, (-) Alzheimer's Disease  Allergic/Immunologic: (-) allergies latex, (-) allergies metal, (-) skin sensitivity.   Hematlogic: (-) anemia, (-) blood transfusion, (-) DVT/PE, (-) Clotting the supraspinatus, 5/5 internal rotation and 5/5 in external rotation, and Left shoulder motor strength 5/5 in supraspinatus, 5/5 in internal rotation, 5/5 in external rotation. Elbow exam:  Evaluation of the elbow, reveals no signs of swelling or deformity. ROM is 0-140. There is not instability with varus/valgus stresses. Motor strength is 4/5 with flexion/extension. Wrist exam:  Inspection of the bilateral upper extremities, there is no evidence of deformity of the wrist.  ROM Wrist ROM R wrist DF 90, VF 90, L wrist DF 90, VF 90, R pronation 90/ supination 90, L pronation 90/supination 90. Motor strength is 5/5 with Dorsiflexion/Volarflexion/Supination/Pronation. Motor and sensation is intact and symmetric throughout the bilateral upper extremities in the median, ulnar and radial , musclcutaneous, and axillary nerve distributions. Hand exam:  The skin overlying the hand is  intact. There is not evidence of scar, lesion, laceration, or abrasion. The motion in the small joints of the hand are intact with no stiffness or deformity. The ROM in the MCP flexion 90/ extension 0 , PIP flexion 90/ extension 0, DIP flexion 70/ extension 0. There is not rotational deformity. There is no masses or adenopathy in bilateral upper extremities. Radial pulses are 2+ and symmetric bilaterally. Capillary refill is intact and < 2 seconds. Motor strength is 5/5 with flexion and extension of the small finger joints. Phallens sign(-), Tinnells sign (-), Median nerve compression test (-),  Finklesteins (-), [employer] Grind test (-), [employer](-). Xrays:   N/a    EMG:  Moderate carpal tunnel  Radiographic findings reviewed with patient    Impression:   No diagnosis found. Plan:   Natural history and expected course discussed. Questions answered. Educational materials distributed. Rest, ice, compression, and elevation (RICE) therapy. Reduction in offending activity discussed.   Verbal and written consent for the injection was given by the patient. The patient was placed in a supine position and the right wrist was prepped with alcohol. She was injected with 1 mL of 0.25% Marcaine and 1 mL of Kenalog into the wrist.  The patient tolerated the injection well.

## 2022-02-08 ENCOUNTER — TREATMENT (OUTPATIENT)
Dept: SPEECH THERAPY | Age: 52
End: 2022-02-08
Payer: COMMERCIAL

## 2022-02-08 DIAGNOSIS — R41.841 COGNITIVE COMMUNICATION DEFICIT: Primary | ICD-10-CM

## 2022-02-08 PROCEDURE — 97129 THER IVNTJ 1ST 15 MIN: CPT | Performed by: SPEECH-LANGUAGE PATHOLOGIST

## 2022-02-08 PROCEDURE — 97130 THER IVNTJ EA ADDL 15 MIN: CPT | Performed by: SPEECH-LANGUAGE PATHOLOGIST

## 2022-02-15 ENCOUNTER — TREATMENT (OUTPATIENT)
Dept: SPEECH THERAPY | Age: 52
End: 2022-02-15
Payer: COMMERCIAL

## 2022-02-15 DIAGNOSIS — R41.841 COGNITIVE COMMUNICATION DEFICIT: Primary | ICD-10-CM

## 2022-02-15 PROCEDURE — 97130 THER IVNTJ EA ADDL 15 MIN: CPT | Performed by: SPEECH-LANGUAGE PATHOLOGIST

## 2022-02-15 PROCEDURE — 97129 THER IVNTJ 1ST 15 MIN: CPT | Performed by: SPEECH-LANGUAGE PATHOLOGIST

## 2022-02-15 NOTE — PROGRESS NOTES
Patient seen for 45 minute in person session this date. Patient reports feeling quite stressed and overwhelmed with work issues. She detailed problems that she is having at work with recall of information, following plans and staying focused on tasks. We discussed strategies she can implement to help with these and she states she will try to implement. Today, we worked on short term recall of list of words presented verbally with no distractor. She completed the task with fair performance with mod cues and some repetitions. She continues to use her memory planner with improved recall per her report. Homework activities encouraged. Will continue. Carmen Loving.  Hassie Mcardle, MA/EMILIANA-SLP  [id number]    CPT [id number]

## 2022-02-17 ENCOUNTER — TREATMENT (OUTPATIENT)
Dept: SPEECH THERAPY | Age: 52
End: 2022-02-17
Payer: COMMERCIAL

## 2022-02-17 DIAGNOSIS — R41.841 COGNITIVE COMMUNICATION DEFICIT: Primary | ICD-10-CM

## 2022-02-17 PROCEDURE — 97130 THER IVNTJ EA ADDL 15 MIN: CPT | Performed by: SPEECH-LANGUAGE PATHOLOGIST

## 2022-02-17 PROCEDURE — 97129 THER IVNTJ 1ST 15 MIN: CPT | Performed by: SPEECH-LANGUAGE PATHOLOGIST

## 2022-02-21 ENCOUNTER — HOSPITAL ENCOUNTER (OUTPATIENT)
Age: 52
Discharge: HOME | End: 2022-02-21
Payer: COMMERCIAL

## 2022-02-21 LAB
ALBUMIN SERPL-MCNC: 4 G/DL (ref 3.5–5.2)
ALP BLD-CCNC: 89 U/L (ref 35–104)
ALT SERPL-CCNC: 27 U/L (ref 0–32)
ANION GAP SERPL CALCULATED.3IONS-SCNC: 9 MMOL/L (ref 7–16)
AST SERPL-CCNC: 24 U/L (ref 0–31)
BILIRUB SERPL-MCNC: 0.6 MG/DL (ref 0–1.2)
BUN BLDV-MCNC: 13 MG/DL (ref 6–20)
CALCIUM SERPL-MCNC: 8.8 MG/DL (ref 8.6–10.2)
CHLORIDE BLD-SCNC: 101 MMOL/L (ref 98–107)
CO2: 27 MMOL/L (ref 22–29)
CREAT SERPL-MCNC: 0.7 MG/DL (ref 0.5–1)
GFR AFRICAN AMERICAN: >60
GFR NON-AFRICAN AMERICAN: >60 ML/MIN/1.73
GLUCOSE BLD-MCNC: 92 MG/DL (ref 74–99)
HCT VFR BLD CALC: 38.9 % (ref 34–48)
HEMOGLOBIN: 12.6 G/DL (ref 11.5–15.5)
MCH RBC QN AUTO: 28.9 PG (ref 26–35)
MCHC RBC AUTO-ENTMCNC: 32.4 % (ref 32–34.5)
MCV RBC AUTO: 89.2 FL (ref 80–99.9)
PDW BLD-RTO: 14.5 FL (ref 11.5–15)
PLATELET # BLD: 294 E9/L (ref 130–450)
PMV BLD AUTO: 9.8 FL (ref 7–12)
POTASSIUM SERPL-SCNC: 3.8 MMOL/L (ref 3.5–5)
RBC # BLD: 4.36 E12/L (ref 3.5–5.5)
SEDIMENTATION RATE, ERYTHROCYTE: 32 MM/HR (ref 0–20)
SODIUM BLD-SCNC: 137 MMOL/L (ref 132–146)
TOTAL PROTEIN: 7.2 G/DL (ref 6.4–8.3)
WBC # BLD: 8.4 E9/L (ref 4.5–11.5)

## 2022-02-21 PROCEDURE — 85651 RBC SED RATE NONAUTOMATED: CPT

## 2022-02-21 PROCEDURE — 80053 COMPREHEN METABOLIC PANEL: CPT

## 2022-02-21 PROCEDURE — 36415 COLL VENOUS BLD VENIPUNCTURE: CPT

## 2022-02-21 PROCEDURE — 85027 COMPLETE CBC AUTOMATED: CPT

## 2022-02-21 NOTE — PROGRESS NOTES
Patient seen for 45 minute in person session this date. Patient doing fair- with issues at work and stress. She states she has been permitted to decrease her hours on Wednesday and she is hopeful this will help her with management of stress. Today, we worked on short term recall of short directions presented verbally with no distraction. She struggled to recall specifics and achieved fair-/poor performance on task despite mod/max cues/repetitions. She was instructed on use of visualization techniques to aid in recall with limited success. Homework tasks encouraged. Will continue. Dominga Younger.  Alberto Draper MA/CCC-SLP  [id number]    [facility] [id number]

## 2022-02-22 ENCOUNTER — TREATMENT (OUTPATIENT)
Dept: SPEECH THERAPY | Age: 52
End: 2022-02-22
Payer: COMMERCIAL

## 2022-02-22 DIAGNOSIS — R41.841 COGNITIVE COMMUNICATION DEFICIT: Primary | ICD-10-CM

## 2022-02-22 PROCEDURE — 97130 THER IVNTJ EA ADDL 15 MIN: CPT | Performed by: SPEECH-LANGUAGE PATHOLOGIST

## 2022-02-22 PROCEDURE — 97129 THER IVNTJ 1ST 15 MIN: CPT | Performed by: SPEECH-LANGUAGE PATHOLOGIST

## 2022-02-23 NOTE — PROGRESS NOTES
Patient seen for 60 minute in person session this date. Patient more relaxed today. Reports that she did well with her half-work day yesterday and how much it helped with her stress level. Today, we worked on short term recall of simple directives presented verbally with no distractor. She completed the task with fair- performance with mod/max cues and repetitions. She becomes easily flustered and then becomes tangential.  Mod/max cues needed to return to task and remain focused. Homework activities encouraged. Will continue. Marilu Orr.  Leana James MA/CCC-SLP  [id number]    [facility] [id number]

## 2022-02-24 ENCOUNTER — TREATMENT (OUTPATIENT)
Dept: SPEECH THERAPY | Age: 52
End: 2022-02-24
Payer: COMMERCIAL

## 2022-02-24 DIAGNOSIS — R41.841 COGNITIVE COMMUNICATION DEFICIT: Primary | ICD-10-CM

## 2022-02-24 PROCEDURE — 97130 THER IVNTJ EA ADDL 15 MIN: CPT | Performed by: SPEECH-LANGUAGE PATHOLOGIST

## 2022-02-24 PROCEDURE — 97129 THER IVNTJ 1ST 15 MIN: CPT | Performed by: SPEECH-LANGUAGE PATHOLOGIST

## 2022-03-01 ENCOUNTER — TREATMENT (OUTPATIENT)
Dept: SPEECH THERAPY | Age: 52
End: 2022-03-01
Payer: COMMERCIAL

## 2022-03-01 DIAGNOSIS — R41.841 COGNITIVE COMMUNICATION DEFICIT: Primary | ICD-10-CM

## 2022-03-01 PROCEDURE — 97129 THER IVNTJ 1ST 15 MIN: CPT | Performed by: SPEECH-LANGUAGE PATHOLOGIST

## 2022-03-01 PROCEDURE — 97130 THER IVNTJ EA ADDL 15 MIN: CPT | Performed by: SPEECH-LANGUAGE PATHOLOGIST

## 2022-03-03 ENCOUNTER — TREATMENT (OUTPATIENT)
Dept: SPEECH THERAPY | Age: 52
End: 2022-03-03
Payer: COMMERCIAL

## 2022-03-03 DIAGNOSIS — R41.841 COGNITIVE COMMUNICATION DEFICIT: Primary | ICD-10-CM

## 2022-03-03 PROCEDURE — 97129 THER IVNTJ 1ST 15 MIN: CPT | Performed by: SPEECH-LANGUAGE PATHOLOGIST

## 2022-03-03 PROCEDURE — 97130 THER IVNTJ EA ADDL 15 MIN: CPT | Performed by: SPEECH-LANGUAGE PATHOLOGIST

## 2022-03-04 NOTE — PROGRESS NOTES
Patient seen for 45 minute in person session this date. Patient doing ok with continued stressors reported. Today, we worked on short term recall of list of 5 related words presented verbally with no distracter present. Today, she completed tasks recalling avg 3/5 items with mod cues. However, she was only able to recall the items in order x 1/10, mostly recalling the items in a haphazard order. Instructed on chunking technique with some success. Homework tasks encouraged. Will continue. Carmen Loving.  Hassie Mcardle, MA/EMILIANA-SLP  [id number]    [facility] [id number]

## 2022-03-04 NOTE — PROGRESS NOTES
Patient seen for 60 minute in person session this date. Patient reports having had a stressful day so far but appears to be handling the stress better today. We worked on short term recall of details of paragraph length information presented verbally. She completed the tasks with fair performance with some repetition needed. She had some difficulty with focus today and needed cueing/reminder of use of strategies which resulted in fair improvement. Homework activities encouraged. Will continue. Donavan Lovell.  Raeann Daigle MA/EMILIANA-SLP  [id number]    [facility] [id number]

## 2022-03-10 ENCOUNTER — TREATMENT (OUTPATIENT)
Dept: SPEECH THERAPY | Age: 52
End: 2022-03-10
Payer: COMMERCIAL

## 2022-03-10 DIAGNOSIS — R41.841 COGNITIVE COMMUNICATION DEFICIT: Primary | ICD-10-CM

## 2022-03-10 PROCEDURE — 97129 THER IVNTJ 1ST 15 MIN: CPT | Performed by: SPEECH-LANGUAGE PATHOLOGIST

## 2022-03-10 PROCEDURE — 97130 THER IVNTJ EA ADDL 15 MIN: CPT | Performed by: SPEECH-LANGUAGE PATHOLOGIST

## 2022-03-17 ENCOUNTER — TREATMENT (OUTPATIENT)
Dept: SPEECH THERAPY | Age: 52
End: 2022-03-17
Payer: COMMERCIAL

## 2022-03-17 DIAGNOSIS — R41.841 COGNITIVE COMMUNICATION DEFICIT: Primary | ICD-10-CM

## 2022-03-17 PROCEDURE — 97130 THER IVNTJ EA ADDL 15 MIN: CPT | Performed by: SPEECH-LANGUAGE PATHOLOGIST

## 2022-03-17 PROCEDURE — 97129 THER IVNTJ 1ST 15 MIN: CPT | Performed by: SPEECH-LANGUAGE PATHOLOGIST

## 2022-03-17 NOTE — PROGRESS NOTES
Patient seen for 60 minute in person session this date. Patient reports she is doing ok. Today, we worked on short term recall of words and sentences presented verbally with a short distracter added. She completed all with fair/fair+ performance with min/mod cues/repetitions. We discussed continuing the use of compensatory strategies while at school to help with recall. Homework activities encouraged. Will continue. Duyen Nieves.  Mary Kay Hawk MA/CCC-SLP  [id number]    [facility] [id number]

## 2022-03-23 NOTE — PROGRESS NOTES
Patient seen for 60 minute in person session this date. Patient reports she feels she is improving with cognitive skills. Today, we retested skills. In the areas of initial deficit, she achieved:  Immediate Memory improved from 22 moderate deficit to 26 mild deficit; General Information recall improved from 24 moderate deficit to 28 within functional limits; Thought organization improved from 27 mild deficit to 29 within functional limits. She was pleased with her progress. We agreed to decrease sessions to 1x/week and to continue working on immediate/short term recall until she is within functional limits. Homework activities encouraged. Will continue. Rosa Field.  Paulo Delgado MA/CCC-SLP  [id number]    [facility] [id number]

## 2022-03-24 ENCOUNTER — TREATMENT (OUTPATIENT)
Dept: SPEECH THERAPY | Age: 52
End: 2022-03-24
Payer: COMMERCIAL

## 2022-03-24 DIAGNOSIS — R41.841 COGNITIVE COMMUNICATION DEFICIT: Primary | ICD-10-CM

## 2022-03-24 PROCEDURE — 97129 THER IVNTJ 1ST 15 MIN: CPT | Performed by: SPEECH-LANGUAGE PATHOLOGIST

## 2022-03-24 PROCEDURE — 97130 THER IVNTJ EA ADDL 15 MIN: CPT | Performed by: SPEECH-LANGUAGE PATHOLOGIST

## 2022-03-31 ENCOUNTER — TREATMENT (OUTPATIENT)
Dept: SPEECH THERAPY | Age: 52
End: 2022-03-31
Payer: COMMERCIAL

## 2022-03-31 DIAGNOSIS — R41.841 COGNITIVE COMMUNICATION DEFICIT: Primary | ICD-10-CM

## 2022-03-31 PROCEDURE — 97130 THER IVNTJ EA ADDL 15 MIN: CPT | Performed by: SPEECH-LANGUAGE PATHOLOGIST

## 2022-03-31 PROCEDURE — 97129 THER IVNTJ 1ST 15 MIN: CPT | Performed by: SPEECH-LANGUAGE PATHOLOGIST

## 2022-04-05 NOTE — PROGRESS NOTES
Patient seen for 60 minute in person session this date. Patient quite stressed out this date from her job this week. Discussed need for daily implementation of stress relieving tasks. She is trying. Today, we worked on short term recall of paragraph length information presented verbally with no distractor. She completed these tasks with fair/fair- performance with mod/max cues. Patient becomes easily distracted and loses focus and then has difficulty trying to 'get back' to where she was in the conversation. homework activities encouraged. Will continue. Yi Estrada.  Genaro Rodriguez MA/CCC-SLP  [id number]    [facility] [id number]

## 2022-04-07 ENCOUNTER — TREATMENT (OUTPATIENT)
Dept: SPEECH THERAPY | Age: 52
End: 2022-04-07
Payer: COMMERCIAL

## 2022-04-07 DIAGNOSIS — R41.841 COGNITIVE COMMUNICATION DEFICIT: Primary | ICD-10-CM

## 2022-04-07 PROCEDURE — 97130 THER IVNTJ EA ADDL 15 MIN: CPT | Performed by: SPEECH-LANGUAGE PATHOLOGIST

## 2022-04-07 PROCEDURE — 97129 THER IVNTJ 1ST 15 MIN: CPT | Performed by: SPEECH-LANGUAGE PATHOLOGIST

## 2022-04-12 NOTE — PROGRESS NOTES
Patient seen for 60 minute in person session this date. Patient having a stressful few days this week. We worked on topic maintenance in structured conversation with improvement noted in staying focused to fair+/good performance. Word finding was improved to fair+/good also during these tasks. We worked on short term recall of multi=paragraph length information presented verbally with a short distracter. She completed the tasks with fair/fair+ performance with min/mod cues. Homework activities encouraged. Will continue. Rossana Slade.  Genesis Diego MA/CCC-SLP  [id number]    [facility] [phone]

## 2022-04-14 ENCOUNTER — TREATMENT (OUTPATIENT)
Dept: SPEECH THERAPY | Age: 52
End: 2022-04-14
Payer: COMMERCIAL

## 2022-04-14 DIAGNOSIS — R41.841 COGNITIVE COMMUNICATION DEFICIT: Primary | ICD-10-CM

## 2022-04-14 PROCEDURE — 97129 THER IVNTJ 1ST 15 MIN: CPT | Performed by: SPEECH-LANGUAGE PATHOLOGIST

## 2022-04-14 PROCEDURE — 97130 THER IVNTJ EA ADDL 15 MIN: CPT | Performed by: SPEECH-LANGUAGE PATHOLOGIST

## 2022-04-21 NOTE — PROGRESS NOTES
Patient seen for 60 minute in person session this date. Patient reports doing well but has ongoing increase in stress due to job related issues. Today, we worked on short term recall of paragraph length information with short distracters added. Patient completed task with fair+/good recall with min cues only needed. She completed topic maintenance tasks with good performance with min cues; homework activities encouraged. Will continue. Dwight Ojeda.  [facility] LIZZIE Antonio/CCC-SLP  [id number]    CPT [id number]

## 2022-04-28 ENCOUNTER — TREATMENT (OUTPATIENT)
Dept: SPEECH THERAPY | Age: 52
End: 2022-04-28
Payer: COMMERCIAL

## 2022-04-28 DIAGNOSIS — R41.841 COGNITIVE COMMUNICATION DEFICIT: Primary | ICD-10-CM

## 2022-04-28 PROCEDURE — 97130 THER IVNTJ EA ADDL 15 MIN: CPT | Performed by: SPEECH-LANGUAGE PATHOLOGIST

## 2022-04-28 PROCEDURE — 97129 THER IVNTJ 1ST 15 MIN: CPT | Performed by: SPEECH-LANGUAGE PATHOLOGIST

## 2022-05-02 ENCOUNTER — OFFICE VISIT (OUTPATIENT)
Dept: ORTHOPEDIC SURGERY | Age: 52
End: 2022-05-02
Payer: COMMERCIAL

## 2022-05-02 VITALS — BODY MASS INDEX: 33.75 KG/M2 | HEIGHT: 66 IN | WEIGHT: 210 LBS | TEMPERATURE: 98 F

## 2022-05-02 DIAGNOSIS — G56.01 RIGHT CARPAL TUNNEL SYNDROME: Primary | ICD-10-CM

## 2022-05-02 PROCEDURE — 99212 OFFICE O/P EST SF 10 MIN: CPT | Performed by: ORTHOPAEDIC SURGERY

## 2022-05-02 RX ORDER — BUSPIRONE HYDROCHLORIDE 10 MG/1
TABLET ORAL
COMMUNITY
Start: 2022-04-19 | End: 2022-08-30

## 2022-05-02 NOTE — PROGRESS NOTES
Chief Complaint   Patient presents with    Carpal Tunnel     right hand carpal tunnel f/u doing good shot worked great. Sherice Cortez is a 50y.o. year old who presents for follow up of right wrist pain. The pain is located mainly over 1st 3 digits, tingling has still not come back since her injection 3 months ago. The pain level is 0/10. Past Medical History:   Diagnosis Date    Anemia     Arthritis     rheumatoid     Cerebral artery occlusion with cerebral infarction ([address]      left hand feels heavy    History of blood transfusion     x5    Hx of blood clots     vaginal    Menometrorrhagia 11/17/2021     Past Surgical History:   Procedure Laterality Date    CARPAL TUNNEL RELEASE Left 02/12/2021    Left Carpal Tunnel Release    CARPAL TUNNEL RELEASE Left 2/12/2021    LEFT CARPAL TUNNEL RELEASE performed by Jeffrey Francisco DO at [facility] N/A 11/17/2021    DILATATION AND CURETTAGE HYSTEROSCOPY performed by Catarino Madison MD at [facility] 5 Bilateral 12/1/2021    ROBOTIC XI ASSISTED TOTAL LAPAROSCOPIC HYSTERECTOMY WITH BILATERAL SALPINGECTOMY performed by Catarino Madison MD at [address] [place] [address]  2005    TRANSESOPHAGEAL ECHOCARDIOGRAM N/A 1/17/2022    TRANSESOPHAGEAL ECHOCARDIOGRAM WITH BUBBLE STUDY performed by Cristy Du MD at [facility] ENDOSCOPY       Current Outpatient Medications:     busPIRone (BUSPAR) 10 MG tablet, TAKE ONE TABLET BY MOUTH TWO TIMES A DAY, Disp: , Rfl:     etodolac (LODINE) 200 MG capsule, Take 200 mg by mouth every 8 hours, Disp: , Rfl:     vitamin B-6 (PYRIDOXINE) 50 MG tablet, Take 50 mg by mouth daily, Disp: , Rfl:     gabapentin (NEURONTIN) 100 MG capsule, Take 100 mg by mouth 3 times daily. , Disp: , Rfl:     celecoxib (CELEBREX) 200 MG capsule, Take 200 mg by mouth 2 times daily, Disp: , Rfl:     aspirin 81 MG EC tablet, Take 81 mg by mouth daily, Disp: , Rfl:     folic acid (FOLVITE) 800 MCG tablet, Take 800 mcg by mouth daily, Disp: , Rfl:     Multiple Vitamins-Minerals (MULTIVITAMIN ADULT) CHEW, Take 1 tablet by mouth daily, Disp: , Rfl:     Omega-3 Fatty Acids (FISH OIL) 1000 MG CAPS, Take 1,000 mg by mouth daily, Disp: , Rfl:     atorvastatin (LIPITOR) 80 MG tablet, Take 80 mg by mouth nightly, Disp: , Rfl:     ferrous sulfate (IRON 325) 325 (65 Fe) MG tablet, Take 325 mg by mouth 2 times daily , Disp: , Rfl:     NIACIN PO, Take 1 tablet by mouth daily , Disp: , Rfl:     vitamin B-1 (THIAMINE) 100 MG tablet, Take 100 mg by mouth daily, Disp: , Rfl:     Vitamin D (CHOLECALCIFEROL) 25 MCG (1000 UT) TABS tablet, Take 1,000 Units by mouth daily, Disp: , Rfl:     methotrexate (RHEUMATREX) 2.5 MG chemo tablet, Take 20 mg by mouth once a week Friday (8 Tablets), Disp: , Rfl:   Allergies   Allergen Reactions    Amoxicillin Itching and Other (See Comments)     Urine tract     Social History     Socioeconomic History    Marital status: Single     Spouse name: Not on file    Number of children: Not on file    Years of education: 25    Highest education level: Not on file   Occupational History    Occupation:      Comment: [employer]   Tobacco Use    Smoking status: Never Smoker    Smokeless tobacco: Never Used   Vaping Use    Vaping Use: Never used   Substance and Sexual Activity    Alcohol use: Yes     Comment: social    Drug use: No    Sexual activity: Yes     Partners: Male   Other Topics Concern    Not on file   Social History Narrative    Not on file     Social Determinants of Health     Financial Resource Strain:     Difficulty of Paying Living Expenses: Not on file   Food Insecurity:     Worried About Running Out of Food in the Last Year: Not on file    [place] of Food in the Last Year: Not on file   Transportation Needs:     Lack of Transportation (Medical): Not on file    Lack of Transportation (Non-Medical):  Not on file Physical Activity:     Days of Exercise per Week: Not on file    Minutes of Exercise per Session: Not on file   Stress:     Feeling of Stress : Not on file   Social Connections:     Frequency of Communication with Friends and Family: Not on file    Frequency of Social Gatherings with Friends and Family: Not on file    Attends Orthodoxy Services: Not on file    Active Member of [address] [place] [address] [facility] [employer] [facility] or Organizations: Not on file    Attends Club or Organization Meetings: Not on file    Marital Status: Not on file   Intimate Partner Violence:     Fear of Current or Ex-Partner: Not on file    Emotionally Abused: Not on file    Physically Abused: Not on file    Sexually Abused: Not on file   Housing Stability:     Unable to Pay for Housing in the Last Year: Not on file    Number of [place] in the Last Year: Not on file    Unstable Housing in the Last Year: Not on file     Family History   Problem Relation Age of Onset    Other Mother     Arthritis Father     Diabetes Father     Other Father     Breast Cancer Paternal Aunt        REVIEW OF SYSTEMS:    General/Constitution:  (-)weight loss, (-)fever, (-)chills, (-)weakness. Skin: (-) rash,(-) psoriasis,(-) eczema, (-)skin cancer. Musculoskeletal: (-) fractures,  (-) dislocations,(-) collagen vascular disease, (-) fibromyalgia, (-) multiple sclerosis, (-) muscular dystrophy, (-) RSD,(-) joint pain (-)swelling, (-) joint pain,swelling. Neurologic: (-) epilepsy, (-)seizures,(-) brain tumor,(-) TIA, (-)stroke, (-)headaches, (-)Parkinson disease,(-) memory loss, (-) LOC. Cardiovascular: (-) Chest pain, (-) swelling in legs/feet, (-) SOB, (-) cramping in legs/feet with walking. Respiratory: (-) SOB, (-) Coughing, (-) night sweats. GI: (-) nausea, (-) vomiting, (-) diarrhea, (-) blood in stool, (-) gastric ulcer.   Psychiatric: (-) Depression, (-) Anxiety, (-) bipolar disease, (-) Alzheimer's Disease  Allergic/Immunologic: (-) allergies latex, (-) allergies metal, (-) skin sensitivity. Hematlogic: (-) anemia, (-) blood transfusion, (-) DVT/PE, (-) Clotting disorders    Constitutional:  The patient is alert and oriented x 3, appears to be stated age and in no distress. Temp 98 °F (36.7 °C)   Ht 5' 6\" (1.676 m)   Wt 210 lb (95.3 kg)   LMP 09/30/2021   BMI 33.89 kg/m²     Skin:  Upon inspection: the skin appears warm, dry and intact. There is not a previous scar over the affected area. There is not any cellulitis, lymphedema or cutaneous lesions noted in the lower extremities. Upon palpation there is no induration noted. Neurologic:  Gait: normal;  Motor exam of the upper extremities show: The reflexes in biceps/triceps/brachioradialis are equal and symmetric. Sensory exam C5-T1 are normal bilaterallY. Cardiovascular: The vascular exam is normal and is well perfused to distal extremities. There are 2+ radial pulses bilaterally, and motor and sensation is intact to median, ulnar, and radial, musclocutaneus, and axillary nerve distribution and grossly symmetric bilaterally. There is cap refill noted less than two seconds in all digits. There is not edema of the bilateral upper extremities. There is not varicosities noted in the distal extremities. Lymph:  Upon palpation,  there is no lymphadenopathy noted in bilateral upper extremities. Musculoskeletal:  Gait: normal; examination of the nails and digits reveal no cyanosis or clubbing. Cervical Exam:  On physical exam, Yaakov Lambert is well-developed, well-nourished, oriented to person, place and time. her gait is normal.  On evaluation of her cervical spine, she has full range of motion of the cervical spine without pain. There is no cervical tenderness to palpation. Shoulder Exam:  On evaluation of her bilateral upper extremities, her bilateral shoulder has no deformity. There is not evidence of scapular dyskinesis. There is not muscle atrophy in shoulder girdle.  The range of motion for the Right Shoulder is 150/50/t8 and for the Left shoulder is 150/50/t8. Right shoulder Motor strength is 5/5 in the supraspinatus, 5/5 internal rotation and 5/5 in external rotation, and Left shoulder motor strength 5/5 in supraspinatus, 5/5 in internal rotation, 5/5 in external rotation. Elbow exam:  Evaluation of the elbow, reveals no signs of swelling or deformity. ROM is 0-140. There is not instability with varus/valgus stresses. Motor strength is 5/5 with flexion/extension. Wrist exam:  Inspection of the bilateral upper extremities, there is no evidence of deformity of the wrist.  ROM Wrist ROM R wrist DF 90, VF 90, L wrist DF 90, VF 90, R pronation 90/ supination 90, L pronation 90/supination 90. Motor strength is 5/5 with Dorsiflexion/Volarflexion/Supination/Pronation. Motor and sensation is intact and symmetric throughout the bilateral upper extremities in the median, ulnar and radial , musclcutaneous, and axillary nerve distributions. Hand exam:  The skin overlying the hand is  intact. There is not evidence of scar, lesion, laceration, or abrasion. The motion in the small joints of the hand are intact with no stiffness or deformity. The ROM in the MCP flexion 90/ extension 0 , PIP flexion 90/ extension 0, DIP flexion 70/ extension 0. There is not rotational deformity. There is no masses or adenopathy in bilateral upper extremities. Radial pulses are 2+ and symmetric bilaterally. Capillary refill is intact and < 2 seconds. Motor strength is 5/5 with flexion and extension of the small finger joints. Phallens sign(-), Tinnells sign (-), Median nerve compression test (-),  Finklesteins (-), [employer] Grind test (-), [employer](-). Xrays:   n/a  Radiographic findings reviewed with patient    Impression:   Encounter Diagnosis   Name Primary?  Right carpal tunnel syndrome Yes       Plan:   Natural history and expected course discussed.  Questions answered. Educational materials distributed. Rest, ice, compression, and elevation (RICE) therapy. Reduction in offending activity discussed.    Fu prn for csi

## 2022-05-05 NOTE — PROGRESS NOTES
Patient seen for 60 minute in person session this date. Patient is doing well. She is managing her stress better and has been implementing her recall strategies well to improve recall throughout the day. We worked on short term recall with a distractor as well as topic maintenance/focus tasks today. She completed all with good performance and min cues. We discussed discharge and both agreed that she is doing well and has met goals so there is no further need for treatment at this time. She was instructed to continue with all daily brain exercises and to continue to use all recall strategies that she has learned through therapy. She was also instructed to notify this therapist with any questions, concerns, or changes in condition. She agreed with all. Will discharge after this session. Alan Jones.  Jeniffer Severino MA/CCC-SLP  [id number]    CPT 11418/56219

## 2022-08-30 RX ORDER — BUSPIRONE HYDROCHLORIDE 10 MG/1
TABLET ORAL
Qty: 60 TABLET | Refills: 0 | Status: SHIPPED
Start: 2022-08-30 | End: 2022-10-03 | Stop reason: SDUPTHER

## 2022-09-14 RX ORDER — BACLOFEN 10 MG/1
TABLET ORAL
COMMUNITY
Start: 2022-06-23 | End: 2022-09-20

## 2022-09-17 ENCOUNTER — HOSPITAL ENCOUNTER (OUTPATIENT)
Dept: MAMMOGRAPHY | Age: 52
Discharge: HOME | End: 2022-09-19
Payer: COMMERCIAL

## 2022-09-17 DIAGNOSIS — Z12.31 ENCOUNTER FOR SCREENING MAMMOGRAM FOR MALIGNANT NEOPLASM OF BREAST: ICD-10-CM

## 2022-09-17 PROCEDURE — 77067 SCR MAMMO BI INCL CAD: CPT

## 2022-09-20 ENCOUNTER — OFFICE VISIT (OUTPATIENT)
Dept: PRIMARY CARE CLINIC | Age: 52
End: 2022-09-20
Payer: COMMERCIAL

## 2022-09-20 VITALS
TEMPERATURE: 98.2 F | RESPIRATION RATE: 16 BRPM | HEART RATE: 82 BPM | HEIGHT: 66 IN | DIASTOLIC BLOOD PRESSURE: 70 MMHG | OXYGEN SATURATION: 99 % | WEIGHT: 215.5 LBS | SYSTOLIC BLOOD PRESSURE: 136 MMHG | BODY MASS INDEX: 34.63 KG/M2

## 2022-09-20 DIAGNOSIS — E78.2 MIXED HYPERLIPIDEMIA: Primary | ICD-10-CM

## 2022-09-20 DIAGNOSIS — I63.00 CEREBROVASCULAR ACCIDENT (CVA) DUE TO THROMBOSIS OF PRECEREBRAL ARTERY (HCC): ICD-10-CM

## 2022-09-20 DIAGNOSIS — M05.711 RHEUMATOID ARTHRITIS INVOLVING RIGHT SHOULDER WITH POSITIVE RHEUMATOID FACTOR (HCC): ICD-10-CM

## 2022-09-20 DIAGNOSIS — F43.23 ADJUSTMENT DISORDER WITH MIXED ANXIETY AND DEPRESSED MOOD: ICD-10-CM

## 2022-09-20 PROBLEM — Z86.73 HISTORY OF STROKE: Status: ACTIVE | Noted: 2022-09-20

## 2022-09-20 PROCEDURE — 99214 OFFICE O/P EST MOD 30 MIN: CPT | Performed by: INTERNAL MEDICINE

## 2022-09-20 RX ORDER — ESCITALOPRAM OXALATE 10 MG/1
10 TABLET ORAL DAILY
Qty: 30 TABLET | Refills: 3 | Status: SHIPPED | OUTPATIENT
Start: 2022-09-20

## 2022-09-20 RX ORDER — ESCITALOPRAM OXALATE 10 MG/1
10 TABLET ORAL DAILY
Qty: 30 TABLET | Refills: 3 | Status: SHIPPED
Start: 2022-09-20 | End: 2022-09-20

## 2022-09-20 SDOH — ECONOMIC STABILITY: FOOD INSECURITY: WITHIN THE PAST 12 MONTHS, THE FOOD YOU BOUGHT JUST DIDN'T LAST AND YOU DIDN'T HAVE MONEY TO GET MORE.: NEVER TRUE

## 2022-09-20 SDOH — ECONOMIC STABILITY: FOOD INSECURITY: WITHIN THE PAST 12 MONTHS, YOU WORRIED THAT YOUR FOOD WOULD RUN OUT BEFORE YOU GOT MONEY TO BUY MORE.: NEVER TRUE

## 2022-09-20 ASSESSMENT — ENCOUNTER SYMPTOMS
EYE DISCHARGE: 0
EYE REDNESS: 0
EYE ITCHING: 0
RESPIRATORY NEGATIVE: 1
ALLERGIC/IMMUNOLOGIC NEGATIVE: 1
SINUS PAIN: 0
GASTROINTESTINAL NEGATIVE: 1
EYES NEGATIVE: 1
EYE PAIN: 0
FACIAL SWELLING: 0
RHINORRHEA: 0

## 2022-09-20 ASSESSMENT — PATIENT HEALTH QUESTIONNAIRE - PHQ9
SUM OF ALL RESPONSES TO PHQ QUESTIONS 1-9: 0
SUM OF ALL RESPONSES TO PHQ9 QUESTIONS 1 & 2: 0
1. LITTLE INTEREST OR PLEASURE IN DOING THINGS: 0
2. FEELING DOWN, DEPRESSED OR HOPELESS: 0

## 2022-09-20 ASSESSMENT — SOCIAL DETERMINANTS OF HEALTH (SDOH): HOW HARD IS IT FOR YOU TO PAY FOR THE VERY BASICS LIKE FOOD, HOUSING, MEDICAL CARE, AND HEATING?: NOT HARD AT ALL

## 2022-09-20 NOTE — PROGRESS NOTES
Favian Delgadillo (:  1970) is a 50 y.o. female,Established patient, here for evaluation of the following chief complaint(s):  Check-Up      Subjective   SUBJECTIVE/OBJECTIVE:  HPI  Patient with known history of a stroke less than a year ago who has an anxiety and anxiety has been getting worse she is taking her medications no medication side effects patient stating that she is not able to do her work responsibilities and she goes on every day and starts crying she is not suicidal or homicidal.  She lives alone with her dog and boyfriend comes on weekends. Is currently on aspirin and atorvastatin her blood pressure is fairly well controlled she is take BuSpar for anxiety      Review of Systems   Constitutional: Negative. Negative for activity change, appetite change, chills, fatigue and fever. HENT: Negative. Negative for congestion, ear pain, facial swelling, hearing loss, postnasal drip, rhinorrhea and sinus pain. Eyes: Negative. Negative for pain, discharge, redness and itching. Respiratory: Negative. Cardiovascular: Negative. Gastrointestinal: Negative. Endocrine: Negative. Genitourinary: Negative. Status post partial hysterectomy for abnormal uterine bleeding ovaries are not removed   Musculoskeletal:  Positive for arthralgias. Rheumatoid arthritis following up with Dr. Meryl Timmons   Skin: Negative. Allergic/Immunologic: Negative. Neurological: Negative. Hematological: Negative. Psychiatric/Behavioral: Negative.           In HPI            Objective   /70   Pulse 82   Temp 98.2 °F (36.8 °C) (Temporal)   Resp 16   Ht 5' 6\" (1.676 m)   Wt 215 lb 8 oz (97.8 kg)   LMP 2021   SpO2 99%   BMI 34.78 kg/m²   Current Outpatient Medications   Medication Sig Dispense Refill    escitalopram (LEXAPRO) 10 MG tablet Take 1 tablet by mouth daily 30 tablet 3    busPIRone (BUSPAR) 10 MG tablet TAKE ONE TABLET BY MOUTH TWO TIMES A DAY 60 tablet 0 aspirin 81 MG EC tablet Take 81 mg by mouth daily      folic acid (FOLVITE) 150 MCG tablet Take 800 mcg by mouth daily      Multiple Vitamins-Minerals (MULTIVITAMIN ADULT) CHEW Take 1 tablet by mouth daily      Omega-3 Fatty Acids (FISH OIL) 1000 MG CAPS Take 1,000 mg by mouth daily      atorvastatin (LIPITOR) 80 MG tablet Take 80 mg by mouth nightly      vitamin B-1 (THIAMINE) 100 MG tablet Take 100 mg by mouth daily      Vitamin D (CHOLECALCIFEROL) 25 MCG (1000 UT) TABS tablet Take 1,000 Units by mouth daily      methotrexate (RHEUMATREX) 2.5 MG chemo tablet Take 20 mg by mouth once a week Friday (8 Tablets)       No current facility-administered medications for this visit.       Allergies   Allergen Reactions    Amoxicillin Itching and Other (See Comments)     Urine tract        Past Medical History:   Diagnosis Date    Allergic rhinitis     Anemia     Arthritis     rheumatoid     Carpal tunnel syndrome     Cerebral artery occlusion with cerebral infarction ([facility] [address]      left hand feels heavy    History of blood transfusion     x5    Hx of blood clots     vaginal    Menometrorrhagia 11/17/2021    Stroke [facility])      Past Surgical History:   Procedure Laterality Date    CARPAL TUNNEL RELEASE Left 02/12/2021    Left Carpal Tunnel Release    CARPAL TUNNEL RELEASE Left 02/12/2021    LEFT CARPAL TUNNEL RELEASE performed by Catarino Malik DO at [facility] N/A 11/17/2021    DILATATION AND CURETTAGE HYSTEROSCOPY performed by Miguelangel Boland MD at [address] (CERVIX STATUS UNKNOWN) Bilateral 12/01/2021    ROBOTIC XI ASSISTED TOTAL LAPAROSCOPIC HYSTERECTOMY WITH BILATERAL SALPINGECTOMY performed by Miguelangel Boland MD at [facility] (CERVIX NOT REMOVED)      SINUS SURGERY  2005    TRANSESOPHAGEAL ECHOCARDIOGRAM N/A 01/17/2022    TRANSESOPHAGEAL ECHOCARDIOGRAM WITH BUBBLE STUDY performed by Elise Carrero MD at [address] [facility] ENDOSCOPY     Family History Problem Relation Age of Onset    Other Mother     Arthritis Father     Diabetes Father     Other Father     Breast Cancer Paternal Aunt       Social History     Socioeconomic History    Marital status: Single     Spouse name: Not on file    Number of children: Not on file    Years of education: 22    Highest education level: Not on file   Occupational History    Occupation:      Comment: [employer]   Tobacco Use    Smoking status: Never    Smokeless tobacco: Never   Vaping Use    Vaping Use: Never used   Substance and Sexual Activity    Alcohol use: Yes     Comment: social    Drug use: No    Sexual activity: Yes     Partners: Male   Other Topics Concern    Not on file   Social History Narrative    Not on file     Social Determinants of Health     Financial Resource Strain: Low Risk     Difficulty of Paying Living Expenses: Not hard at all   Food Insecurity: No Food Insecurity    Worried About Running Out of Food in the Last Year: Never true    Ran Out of Food in the Last Year: Never true   Transportation Needs: Not on file   Physical Activity: Not on file   Stress: Not on file   Social Connections: Not on file   Intimate Partner Violence: Not on file   Housing Stability: Not on file      Health Maintenance Due   Topic Date Due    Depression Screen  Never done    HIV screen  Never done    Hepatitis C screen  Never done    DTaP/Tdap/Td vaccine (1 - Tdap) Never done    Colorectal Cancer Screen  01/03/2017    COVID-19 Vaccine (3 - Booster for Pfizer series) 08/19/2021    Shingles vaccine (1 of 2) Never done    Flu vaccine (1) 09/01/2022    Lipids  10/09/2022    lski    Physical Exam  Constitutional:       General: She is not in acute distress. Appearance: Normal appearance. She is normal weight. HENT:      Head: Normocephalic and atraumatic.       Right Ear: Tympanic membrane, ear canal and external ear normal.      Left Ear: Tympanic membrane, ear canal and external ear normal. There is no impacted cerumen. Nose: Nose normal. No congestion or rhinorrhea. Mouth/Throat:      Mouth: Mucous membranes are moist.      Pharynx: Oropharynx is clear. No oropharyngeal exudate or posterior oropharyngeal erythema. Eyes:      General: No scleral icterus. Right eye: No discharge. Left eye: No discharge. Pupils: Pupils are equal, round, and reactive to light. Neck:      Vascular: No carotid bruit. Cardiovascular:      Rate and Rhythm: Normal rate and regular rhythm. Pulses: Normal pulses. Heart sounds: No murmur heard. No gallop. Pulmonary:      Effort: Pulmonary effort is normal. No respiratory distress. Breath sounds: Normal breath sounds. No wheezing, rhonchi or rales. Chest:      Chest wall: No tenderness. Abdominal:      General: Bowel sounds are normal.      Palpations: Abdomen is soft. There is no mass. Tenderness: There is no abdominal tenderness. There is no guarding. Hernia: No hernia is present. Musculoskeletal:         General: No swelling, tenderness, deformity or signs of injury. Cervical back: Normal range of motion and neck supple. No rigidity or tenderness. Right lower leg: No edema. Left lower leg: No edema. Lymphadenopathy:      Cervical: No cervical adenopathy. Skin:     General: Skin is warm and dry. Capillary Refill: Capillary refill takes 2 to 3 seconds. Findings: No bruising, lesion or rash. Neurological:      General: No focal deficit present. Mental Status: She is alert and oriented to person, place, and time. Sensory: No sensory deficit. Motor: No weakness. Gait: Gait normal.   Psychiatric:         Mood and Affect: Mood normal.         Behavior: Behavior normal.         Thought Content:  Thought content normal.         Judgment: Judgment normal.             ASSESSMENT/PLAN:    CVA  Hyperlipidemia  Adjustment disorder with distress and the next  Rheumatoid arthritis  Plan as per orders we will get blood work, will give patient off for 2 weeks we will send her to psychiatrist for evaluation and she will get follow-up with me in 2 weeks    Return in about 2 weeks (around 10/4/2022) for follow up, lab. An electronic signature was used to authenticate this note.     --Danyelle Prajapati MD

## 2022-09-23 ENCOUNTER — HOSPITAL ENCOUNTER (OUTPATIENT)
Age: 52
Discharge: HOME | End: 2022-09-23
Payer: COMMERCIAL

## 2022-09-23 LAB
ALBUMIN SERPL-MCNC: 4.1 G/DL (ref 3.5–5.2)
ALP BLD-CCNC: 83 U/L (ref 35–104)
ALT SERPL-CCNC: 24 U/L (ref 0–32)
ANION GAP SERPL CALCULATED.3IONS-SCNC: 8 MMOL/L (ref 7–16)
AST SERPL-CCNC: 23 U/L (ref 0–31)
BILIRUB SERPL-MCNC: 1.1 MG/DL (ref 0–1.2)
BUN BLDV-MCNC: 9 MG/DL (ref 6–20)
CALCIUM SERPL-MCNC: 9.2 MG/DL (ref 8.6–10.2)
CHLORIDE BLD-SCNC: 104 MMOL/L (ref 98–107)
CO2: 25 MMOL/L (ref 22–29)
CREAT SERPL-MCNC: 0.6 MG/DL (ref 0.5–1)
GFR AFRICAN AMERICAN: >60
GFR NON-AFRICAN AMERICAN: >60 ML/MIN/1.73
GLUCOSE BLD-MCNC: 98 MG/DL (ref 74–99)
HCT VFR BLD CALC: 41.7 % (ref 34–48)
HEMOGLOBIN: 14.1 G/DL (ref 11.5–15.5)
MCH RBC QN AUTO: 30.8 PG (ref 26–35)
MCHC RBC AUTO-ENTMCNC: 33.8 % (ref 32–34.5)
MCV RBC AUTO: 91 FL (ref 80–99.9)
PDW BLD-RTO: 15.1 FL (ref 11.5–15)
PLATELET # BLD: 310 E9/L (ref 130–450)
PMV BLD AUTO: 9.5 FL (ref 7–12)
POTASSIUM SERPL-SCNC: 4.3 MMOL/L (ref 3.5–5)
RBC # BLD: 4.58 E12/L (ref 3.5–5.5)
SEDIMENTATION RATE, ERYTHROCYTE: 15 MM/HR (ref 0–20)
SODIUM BLD-SCNC: 137 MMOL/L (ref 132–146)
TOTAL PROTEIN: 7.6 G/DL (ref 6.4–8.3)
WBC # BLD: 8.7 E9/L (ref 4.5–11.5)

## 2022-09-23 PROCEDURE — 85651 RBC SED RATE NONAUTOMATED: CPT

## 2022-09-23 PROCEDURE — 85027 COMPLETE CBC AUTOMATED: CPT

## 2022-09-23 PROCEDURE — 36415 COLL VENOUS BLD VENIPUNCTURE: CPT

## 2022-09-23 PROCEDURE — 80053 COMPREHEN METABOLIC PANEL: CPT

## 2022-10-03 ENCOUNTER — OFFICE VISIT (OUTPATIENT)
Dept: PRIMARY CARE CLINIC | Age: 52
End: 2022-10-03
Payer: COMMERCIAL

## 2022-10-03 VITALS
DIASTOLIC BLOOD PRESSURE: 82 MMHG | HEIGHT: 66 IN | OXYGEN SATURATION: 98 % | WEIGHT: 215 LBS | BODY MASS INDEX: 34.55 KG/M2 | SYSTOLIC BLOOD PRESSURE: 130 MMHG | TEMPERATURE: 98 F | HEART RATE: 66 BPM

## 2022-10-03 DIAGNOSIS — F43.23 ADJUSTMENT DISORDER WITH MIXED ANXIETY AND DEPRESSED MOOD: Primary | ICD-10-CM

## 2022-10-03 PROCEDURE — 99213 OFFICE O/P EST LOW 20 MIN: CPT | Performed by: INTERNAL MEDICINE

## 2022-10-03 RX ORDER — BUSPIRONE HYDROCHLORIDE 10 MG/1
5 TABLET ORAL 3 TIMES DAILY
Qty: 60 TABLET | Refills: 0 | Status: SHIPPED | OUTPATIENT
Start: 2022-10-03

## 2022-10-03 NOTE — PROGRESS NOTES
Buster Mills (:  1970) is a 50 y.o. female,Established patient, here for evaluation of the following chief complaint(s):  Check-Up (2 week flp)      Subjective   SUBJECTIVE/OBJECTIVE:  HPI  Is here with her dad  To the medications felt confused and foggy that she is on drugs still have an exam 2 episodes which she had a panic attack and could not breathe. She is taking medications as prescribed. Cannot do her job at that time. Because of all the stress she has been through and she stated that she has not slept past 3 days patient is not suicidal  Review of Systems   All other systems reviewed and are negative. Objective   /82   Pulse 66   Temp 98 °F (36.7 °C) (Temporal)   Ht 5' 6\" (1.676 m)   Wt 215 lb (97.5 kg)   LMP 2021   SpO2 98%   BMI 34.70 kg/m²   Current Outpatient Medications   Medication Sig Dispense Refill    busPIRone (BUSPAR) 10 MG tablet Take 0.5 tablets by mouth 3 times daily 60 tablet 0    escitalopram (LEXAPRO) 10 MG tablet Take 1 tablet by mouth daily 30 tablet 3    aspirin 81 MG EC tablet Take 81 mg by mouth daily      folic acid (FOLVITE) 068 MCG tablet Take 800 mcg by mouth daily      Multiple Vitamins-Minerals (MULTIVITAMIN ADULT) CHEW Take 1 tablet by mouth daily      Omega-3 Fatty Acids (FISH OIL) 1000 MG CAPS Take 1,000 mg by mouth daily      atorvastatin (LIPITOR) 80 MG tablet Take 80 mg by mouth nightly      vitamin B-1 (THIAMINE) 100 MG tablet Take 100 mg by mouth daily      Vitamin D (CHOLECALCIFEROL) 25 MCG (1000 UT) TABS tablet Take 1,000 Units by mouth daily      methotrexate (RHEUMATREX) 2.5 MG chemo tablet Take 20 mg by mouth once a week Friday (8 Tablets)       No current facility-administered medications for this visit.       Allergies   Allergen Reactions    Amoxicillin Itching and Other (See Comments)     Urine tract        Past Medical History:   Diagnosis Date    Allergic rhinitis     Anemia     Arthritis     rheumatoid     Carpal tunnel syndrome     Cerebral artery occlusion with cerebral infarction ([facility] [address]      left hand feels heavy    History of blood transfusion     x5    Hx of blood clots     vaginal    Menometrorrhagia 11/17/2021    Stroke [facility])      Past Surgical History:   Procedure Laterality Date    CARPAL TUNNEL RELEASE Left 02/12/2021    Left Carpal Tunnel Release    CARPAL TUNNEL RELEASE Left 02/12/2021    LEFT CARPAL TUNNEL RELEASE performed by Yovani Conley DO at [place] N/A 11/17/2021    DILATATION AND CURETTAGE HYSTEROSCOPY performed by Shaheed Corrales MD at [facility] [address] ([address] [facility]) Bilateral 12/01/2021    ROBOTIC XI ASSISTED TOTAL LAPAROSCOPIC HYSTERECTOMY WITH BILATERAL SALPINGECTOMY performed by Shaheed Corrales MD at [address] [facility] (CERVIX NOT REMOVED)      SINUS SURGERY  2005    TRANSESOPHAGEAL ECHOCARDIOGRAM N/A 01/17/2022    TRANSESOPHAGEAL ECHOCARDIOGRAM WITH BUBBLE STUDY performed by Tono Puri MD [facility] [address] [facility] History   Problem Relation Age of Onset    Other Mother     Arthritis Father     Diabetes Father     Other Father     Breast Cancer Paternal Aunt       Social History     Socioeconomic History    Marital status: Single     Spouse name: Not on file    Number of children: Not on file    Years of education: 22    Highest education level: Not on file   Occupational History    Occupation:      Comment: [employer]   Tobacco Use    Smoking status: Never    Smokeless tobacco: Never   Vaping Use    Vaping Use: Never used   Substance and Sexual Activity    Alcohol use: Yes     Comment: social    Drug use: No    Sexual activity: Yes     Partners: Male   Other Topics Concern    Not on file   Social History Narrative    Not on file     Social Determinants of Health     Financial Resource Strain: Low Risk     Difficulty of Paying Living Expenses: Not hard at all   Food Insecurity: No Food Insecurity    Worried About Running Out of Food in the Last Year: Never true    Ran Out of Food in the Last Year: Never true   Transportation Needs: Not on file   Physical Activity: Not on file   Stress: Not on file   Social Connections: Not on file   Intimate Partner Violence: Not on file   Housing Stability: Not on file      Health Maintenance Due   Topic Date Due    HIV screen  Never done    Hepatitis C screen  Never done    DTaP/Tdap/Td vaccine (1 - Tdap) Never done    Colorectal Cancer Screen  01/03/2017    Cervical cancer screen  07/01/2018    COVID-19 Vaccine (3 - Booster for Pfizer series) 08/19/2021    Shingles vaccine (1 of 2) Never done    Flu vaccine (1) 08/01/2022    Lipids  10/09/2022    lski    Physical Exam  Constitutional:       General: She is not in acute distress. Appearance: Normal appearance. She is normal weight. HENT:      Head: Normocephalic and atraumatic. Right Ear: Tympanic membrane, ear canal and external ear normal.      Left Ear: Tympanic membrane, ear canal and external ear normal. There is no impacted cerumen. Nose: Nose normal. No congestion or rhinorrhea. Mouth/Throat:      Mouth: Mucous membranes are moist.      Pharynx: Oropharynx is clear. No oropharyngeal exudate or posterior oropharyngeal erythema. Eyes:      General: No scleral icterus. Right eye: No discharge. Left eye: No discharge. Pupils: Pupils are equal, round, and reactive to light. Neck:      Vascular: No carotid bruit. Cardiovascular:      Rate and Rhythm: Normal rate and regular rhythm. Pulses: Normal pulses. Heart sounds: No murmur heard. No gallop. Pulmonary:      Effort: Pulmonary effort is normal. No respiratory distress. Breath sounds: Normal breath sounds. No wheezing, rhonchi or rales. Chest:      Chest wall: No tenderness. Abdominal:      General: Bowel sounds are normal.      Palpations: Abdomen is soft. There is no mass. Tenderness: There is no abdominal tenderness. There is no guarding. Hernia: No hernia is present. Musculoskeletal:         General: No swelling, tenderness, deformity or signs of injury. Cervical back: Normal range of motion and neck supple. No rigidity or tenderness. Right lower leg: No edema. Left lower leg: No edema. Lymphadenopathy:      Cervical: No cervical adenopathy. Skin:     General: Skin is warm and dry. Capillary Refill: Capillary refill takes 2 to 3 seconds. Findings: No bruising, lesion or rash. Neurological:      General: No focal deficit present. Mental Status: She is alert and oriented to person, place, and time. Sensory: No sensory deficit. Motor: No weakness. Gait: Gait normal.   Psychiatric:         Mood and Affect: Mood normal.         Behavior: Behavior normal.         Thought Content: Thought content normal.         Judgment: Judgment normal.             ASSESSMENT/PLAN:  1. Adjustment disorder with mixed anxiety and depressed mood  -     External Referral To Psychology    No follow-ups on file. Decrease buspirone to 5 mg p.o. 3 times daily instead of 10 twice daily she can cut it to 2 daily    Patient is to continue off work for the next 4 weeks estimated if she feels better she can go back to work earlier      [facility] this [facility].     --Clint Oscar MD

## 2022-10-04 ENCOUNTER — TELEPHONE (OUTPATIENT)
Dept: PRIMARY CARE CLINIC | Age: 52
End: 2022-10-04

## 2022-10-04 NOTE — TELEPHONE ENCOUNTER
Pt called in stating dr Dulce Maria [place] see her for a month (11/16) pt is to go back to work 11/03  Asking if  has a few recommendations that he suggests for her to look into  Please advise

## 2023-01-16 ENCOUNTER — HOSPITAL ENCOUNTER (OUTPATIENT)
Age: 53
Discharge: HOME | End: 2023-01-16
Payer: COMMERCIAL

## 2023-01-16 LAB
ALBUMIN SERPL-MCNC: 4.5 G/DL (ref 3.5–5.2)
ALP BLD-CCNC: 86 U/L (ref 35–104)
ALT SERPL-CCNC: 32 U/L (ref 0–32)
ANION GAP SERPL CALCULATED.3IONS-SCNC: 9 MMOL/L (ref 7–16)
AST SERPL-CCNC: 27 U/L (ref 0–31)
BILIRUB SERPL-MCNC: 1.1 MG/DL (ref 0–1.2)
BUN BLDV-MCNC: 15 MG/DL (ref 6–20)
CALCIUM SERPL-MCNC: 9.2 MG/DL (ref 8.6–10.2)
CHLORIDE BLD-SCNC: 98 MMOL/L (ref 98–107)
CO2: 27 MMOL/L (ref 22–29)
CREAT SERPL-MCNC: 0.6 MG/DL (ref 0.5–1)
GFR SERPL CREATININE-BSD FRML MDRD: >60 ML/MIN/1.73
GLUCOSE BLD-MCNC: 102 MG/DL (ref 74–99)
HCT VFR BLD CALC: 43.3 % (ref 34–48)
HEMOGLOBIN: 14.2 G/DL (ref 11.5–15.5)
MCH RBC QN AUTO: 30.1 PG (ref 26–35)
MCHC RBC AUTO-ENTMCNC: 32.8 % (ref 32–34.5)
MCV RBC AUTO: 91.7 FL (ref 80–99.9)
PDW BLD-RTO: 13.7 FL (ref 11.5–15)
PLATELET # BLD: 380 E9/L (ref 130–450)
PMV BLD AUTO: 9.6 FL (ref 7–12)
POTASSIUM SERPL-SCNC: 4.2 MMOL/L (ref 3.5–5)
RBC # BLD: 4.72 E12/L (ref 3.5–5.5)
SEDIMENTATION RATE, ERYTHROCYTE: 32 MM/HR (ref 0–20)
SODIUM BLD-SCNC: 134 MMOL/L (ref 132–146)
TOTAL PROTEIN: 7.8 G/DL (ref 6.4–8.3)
WBC # BLD: 12.4 E9/L (ref 4.5–11.5)

## 2023-01-16 PROCEDURE — 80053 COMPREHEN METABOLIC PANEL: CPT

## 2023-01-16 PROCEDURE — 85027 COMPLETE CBC AUTOMATED: CPT

## 2023-01-16 PROCEDURE — 36415 COLL VENOUS BLD VENIPUNCTURE: CPT

## 2023-01-16 PROCEDURE — 85651 RBC SED RATE NONAUTOMATED: CPT

## 2023-05-01 ENCOUNTER — HOSPITAL ENCOUNTER (OUTPATIENT)
Age: 53
Discharge: HOME | End: 2023-05-01
Payer: COMMERCIAL

## 2023-05-01 LAB
ALBUMIN SERPL-MCNC: 4 G/DL (ref 3.5–5.2)
ALP SERPL-CCNC: 72 U/L (ref 35–104)
ALT SERPL-CCNC: 32 U/L (ref 0–32)
ANION GAP SERPL CALCULATED.3IONS-SCNC: 10 MMOL/L (ref 7–16)
AST SERPL-CCNC: 29 U/L (ref 0–31)
BILIRUB SERPL-MCNC: 0.4 MG/DL (ref 0–1.2)
BUN SERPL-MCNC: 15 MG/DL (ref 6–20)
CALCIUM SERPL-MCNC: 8.6 MG/DL (ref 8.6–10.2)
CHLORIDE SERPL-SCNC: 102 MMOL/L (ref 98–107)
CO2 SERPL-SCNC: 26 MMOL/L (ref 22–29)
CREAT SERPL-MCNC: 0.7 MG/DL (ref 0.5–1)
ERYTHROCYTE [DISTWIDTH] IN BLOOD BY AUTOMATED COUNT: 14.5 FL (ref 11.5–15)
ERYTHROCYTE [SEDIMENTATION RATE] IN BLOOD BY WESTERGREN METHOD: 14 MM/HR (ref 0–20)
GLUCOSE SERPL-MCNC: 78 MG/DL (ref 74–99)
HCT VFR BLD AUTO: 38.3 % (ref 34–48)
HGB BLD-MCNC: 12.7 G/DL (ref 11.5–15.5)
MCH RBC QN AUTO: 30.5 PG (ref 26–35)
MCHC RBC AUTO-ENTMCNC: 33.2 % (ref 32–34.5)
MCV RBC AUTO: 92.1 FL (ref 80–99.9)
PLATELET # BLD AUTO: 341 E9/L (ref 130–450)
PMV BLD AUTO: 9.6 FL (ref 7–12)
POTASSIUM SERPL-SCNC: 4.1 MMOL/L (ref 3.5–5)
PROT SERPL-MCNC: 6.9 G/DL (ref 6.4–8.3)
RBC # BLD AUTO: 4.16 E12/L (ref 3.5–5.5)
SODIUM SERPL-SCNC: 138 MMOL/L (ref 132–146)
WBC # BLD: 11.3 E9/L (ref 4.5–11.5)

## 2023-05-01 PROCEDURE — 80053 COMPREHEN METABOLIC PANEL: CPT

## 2023-05-01 PROCEDURE — 85027 COMPLETE CBC AUTOMATED: CPT

## 2023-05-01 PROCEDURE — 36415 COLL VENOUS BLD VENIPUNCTURE: CPT

## 2023-05-01 PROCEDURE — 85651 RBC SED RATE NONAUTOMATED: CPT

## 2023-07-27 ENCOUNTER — OFFICE VISIT (OUTPATIENT)
Dept: PRIMARY CARE CLINIC | Age: 53
End: 2023-07-27
Payer: COMMERCIAL

## 2023-07-27 VITALS
BODY MASS INDEX: 38.89 KG/M2 | OXYGEN SATURATION: 94 % | SYSTOLIC BLOOD PRESSURE: 122 MMHG | DIASTOLIC BLOOD PRESSURE: 70 MMHG | HEIGHT: 66 IN | HEART RATE: 77 BPM | WEIGHT: 242 LBS | TEMPERATURE: 97.9 F

## 2023-07-27 DIAGNOSIS — E66.9 OBESITY (BMI 35.0-39.9 WITHOUT COMORBIDITY): ICD-10-CM

## 2023-07-27 DIAGNOSIS — M05.711 RHEUMATOID ARTHRITIS INVOLVING RIGHT SHOULDER WITH POSITIVE RHEUMATOID FACTOR (HCC): ICD-10-CM

## 2023-07-27 DIAGNOSIS — Z86.73 HISTORY OF STROKE: ICD-10-CM

## 2023-07-27 DIAGNOSIS — I63.00 CEREBROVASCULAR ACCIDENT (CVA) DUE TO THROMBOSIS OF PRECEREBRAL ARTERY (HCC): ICD-10-CM

## 2023-07-27 DIAGNOSIS — R06.02 SOB (SHORTNESS OF BREATH): ICD-10-CM

## 2023-07-27 DIAGNOSIS — E78.2 MIXED HYPERLIPIDEMIA: Primary | ICD-10-CM

## 2023-07-27 PROCEDURE — G8427 DOCREV CUR MEDS BY ELIG CLIN: HCPCS | Performed by: INTERNAL MEDICINE

## 2023-07-27 PROCEDURE — 1036F TOBACCO NON-USER: CPT | Performed by: INTERNAL MEDICINE

## 2023-07-27 PROCEDURE — G8417 CALC BMI ABV UP PARAM F/U: HCPCS | Performed by: INTERNAL MEDICINE

## 2023-07-27 PROCEDURE — 99214 OFFICE O/P EST MOD 30 MIN: CPT | Performed by: INTERNAL MEDICINE

## 2023-07-27 PROCEDURE — 3017F COLORECTAL CA SCREEN DOC REV: CPT | Performed by: INTERNAL MEDICINE

## 2023-07-27 RX ORDER — CLONAZEPAM 0.5 MG/1
TABLET ORAL
COMMUNITY
Start: 2023-07-18

## 2023-07-27 SDOH — ECONOMIC STABILITY: FOOD INSECURITY: WITHIN THE PAST 12 MONTHS, THE FOOD YOU BOUGHT JUST DIDN'T LAST AND YOU DIDN'T HAVE MONEY TO GET MORE.: NEVER TRUE

## 2023-07-27 SDOH — ECONOMIC STABILITY: FOOD INSECURITY: WITHIN THE PAST 12 MONTHS, YOU WORRIED THAT YOUR FOOD WOULD RUN OUT BEFORE YOU GOT MONEY TO BUY MORE.: NEVER TRUE

## 2023-07-27 SDOH — ECONOMIC STABILITY: INCOME INSECURITY: HOW HARD IS IT FOR YOU TO PAY FOR THE VERY BASICS LIKE FOOD, HOUSING, MEDICAL CARE, AND HEATING?: NOT HARD AT ALL

## 2023-07-27 SDOH — ECONOMIC STABILITY: HOUSING INSECURITY
IN THE LAST 12 MONTHS, WAS THERE A TIME WHEN YOU DID NOT HAVE A STEADY PLACE TO SLEEP OR SLEPT IN A SHELTER (INCLUDING NOW)?: NO

## 2023-07-27 ASSESSMENT — PATIENT HEALTH QUESTIONNAIRE - PHQ9
SUM OF ALL RESPONSES TO PHQ QUESTIONS 1-9: 0
2. FEELING DOWN, DEPRESSED OR HOPELESS: 0
SUM OF ALL RESPONSES TO PHQ QUESTIONS 1-9: 0
SUM OF ALL RESPONSES TO PHQ9 QUESTIONS 1 & 2: 0
SUM OF ALL RESPONSES TO PHQ QUESTIONS 1-9: 0
SUM OF ALL RESPONSES TO PHQ QUESTIONS 1-9: 0
1. LITTLE INTEREST OR PLEASURE IN DOING THINGS: 0

## 2023-07-27 NOTE — PROGRESS NOTES
Medication Sig Dispense Refill    escitalopram (LEXAPRO) 10 MG tablet Take 1 tablet by mouth daily 30 tablet 3    aspirin 81 MG EC tablet Take 1 tablet by mouth daily      folic acid (FOLVITE) 262 MCG tablet Take 1 tablet by mouth daily      Multiple Vitamins-Minerals (MULTIVITAMIN ADULT) CHEW Take 1 tablet by mouth daily      Omega-3 Fatty Acids (FISH OIL) 1000 MG CAPS Take 1 capsule by mouth daily      vitamin B-1 (THIAMINE) 100 MG tablet Take 1 tablet by mouth daily      Vitamin D (CHOLECALCIFEROL) 25 MCG (1000 UT) TABS tablet Take 1 tablet by mouth daily      methotrexate (RHEUMATREX) 2.5 MG chemo tablet Take 8 tablets by mouth once a week Friday (8 Tablets)      clonazePAM (KLONOPIN) 0.5 MG tablet TAKE ONE TABLET BY MOUTH TWO TIMES A DAY AS NEEDED      busPIRone (BUSPAR) 10 MG tablet Take 0.5 tablets by mouth 3 times daily (Patient not taking: Reported on 7/27/2023) 60 tablet 0    atorvastatin (LIPITOR) 80 MG tablet Take 80 mg by mouth nightly (Patient not taking: Reported on 7/27/2023)       No current facility-administered medications for this visit.       Allergies   Allergen Reactions    Amoxicillin Itching and Other (See Comments)     Urine tract        Past Medical History:   Diagnosis Date    Allergic rhinitis     Anemia     Arthritis     rheumatoid     Carpal tunnel syndrome     Cerebral artery occlusion with cerebral infarction ([address])      left hand feels heavy    History of blood transfusion     x5    Hx of blood clots     vaginal    Menometrorrhagia 11/17/2021    Stroke [facility])      Past Surgical History:   Procedure Laterality Date    CARPAL TUNNEL RELEASE Left 02/12/2021    Left Carpal Tunnel Release    CARPAL TUNNEL RELEASE Left 02/12/2021    LEFT CARPAL TUNNEL RELEASE performed by Greg Vázquez DO at [address] N/A 11/17/2021    DILATATION AND CURETTAGE HYSTEROSCOPY performed by Andria Palacios MD at [address] [facility] ([facility])

## 2023-08-31 DIAGNOSIS — G56.01 RIGHT CARPAL TUNNEL SYNDROME: Primary | ICD-10-CM

## 2023-09-01 ENCOUNTER — HOSPITAL ENCOUNTER (OUTPATIENT)
Age: 53
End: 2023-09-01
Attending: INTERNAL MEDICINE
Payer: COMMERCIAL

## 2023-09-01 ENCOUNTER — HOSPITAL ENCOUNTER (OUTPATIENT)
Dept: GENERAL RADIOLOGY | Age: 53
End: 2023-09-01
Attending: INTERNAL MEDICINE
Payer: COMMERCIAL

## 2023-09-01 ENCOUNTER — HOSPITAL ENCOUNTER (OUTPATIENT)
Dept: PULMONOLOGY | Age: 53
Discharge: HOME | End: 2023-09-01
Attending: INTERNAL MEDICINE
Payer: COMMERCIAL

## 2023-09-01 DIAGNOSIS — R06.02 SOB (SHORTNESS OF BREATH): ICD-10-CM

## 2023-09-01 PROCEDURE — 94729 DIFFUSING CAPACITY: CPT

## 2023-09-01 PROCEDURE — 71046 X-RAY EXAM CHEST 2 VIEWS: CPT

## 2023-09-01 PROCEDURE — 94726 PLETHYSMOGRAPHY LUNG VOLUMES: CPT

## 2023-09-01 PROCEDURE — 94060 EVALUATION OF WHEEZING: CPT

## 2023-09-01 NOTE — PROCEDURES
Date of Exam: 09/01/23        No obstruction  No Bronchodilator response  No evidence of Air trapping  + Restriction  +  Diffusion defect. There is evidence of restrictive lung disease with diffusion defect suggestive of possible interstitial lung disease. Recommend CT chest, connective tissue disease work-up. Recommend clinical correlation.     Ari Nunn MD MS  Pulmonary [facility]

## 2023-09-05 ENCOUNTER — OFFICE VISIT (OUTPATIENT)
Dept: ORTHOPEDIC SURGERY | Age: 53
End: 2023-09-05

## 2023-09-05 VITALS — HEIGHT: 66 IN | TEMPERATURE: 98 F | WEIGHT: 242 LBS | BODY MASS INDEX: 38.89 KG/M2

## 2023-09-05 DIAGNOSIS — M77.8 TENDINITIS OF EXTENSOR TENDON OF RIGHT HAND: Primary | ICD-10-CM

## 2023-09-05 NOTE — PROGRESS NOTES
rotation, 5/5 in external rotation. Elbow exam:  Evaluation of the elbow, reveals no signs of swelling or deformity. ROM is 0-150. There is not instability with varus/valgus stresses. Motor strength is 5/5 with flexion/extension. Wrist exam:  Inspection of the bilateral upper extremities, there is no evidence of deformity of the wrist.  ROM Wrist ROM R wrist DF 80, VF 70, L wrist DF 90, VF 90, R pronation 90/ supination 90, L pronation 90/supination 90. Motor strength is 5/5 with Dorsiflexion/Volarflexion/Supination/Pronation. Motor and sensation is intact and symmetric throughout the bilateral upper extremities in the median, ulnar and radial , musclcutaneous, and axillary nerve distributions. Tenderness over ulnar side of wrist    Hand exam:  The skin overlying the hand is  intact. There is not evidence of scar, lesion, laceration, or abrasion. The motion in the small joints of the hand are intact with no stiffness or deformity. The ROM in the MCP flexion WNL/ extension WNL , PIP flexion WNL/ extension WNL, DIP flexion WNL/ extension WNL. There is not rotational deformity. There is no masses or adenopathy in bilateral upper extremities. Radial pulses are 2+ and symmetric bilaterally. Capillary refill is intact and < 2 seconds. Motor strength is 5/5 with flexion and extension of the small finger joints. Right:  Phallens sign(-), Tinnells sign (-), Median nerve compression test (-),  Finklesteins (-), CMC Grind test (-), [employer](-). Left:    Phallens sign(-), Tinnells sign (-), Median nerve compression test (-),  Finklesteins (-), C[employer] Grind test (-), [employer](-). Xrays:   No fracture or dislocation. Question tiny loose osseous body or nonspecific  soft tissue calcifications interposed between the inferior aspect of the  lunate and triquetrum bones. MRI of the wrist can be considered for further  evaluation.     Radiographic findings reviewed with patient    Impression:

## 2023-09-19 ENCOUNTER — OFFICE VISIT (OUTPATIENT)
Dept: PRIMARY CARE CLINIC | Age: 53
End: 2023-09-19
Payer: COMMERCIAL

## 2023-09-19 VITALS
BODY MASS INDEX: 40.02 KG/M2 | DIASTOLIC BLOOD PRESSURE: 70 MMHG | HEIGHT: 66 IN | TEMPERATURE: 97.9 F | WEIGHT: 249 LBS | SYSTOLIC BLOOD PRESSURE: 120 MMHG | OXYGEN SATURATION: 96 % | HEART RATE: 85 BPM

## 2023-09-19 DIAGNOSIS — M05.711 RHEUMATOID ARTHRITIS INVOLVING RIGHT SHOULDER WITH POSITIVE RHEUMATOID FACTOR (HCC): Primary | ICD-10-CM

## 2023-09-19 DIAGNOSIS — E66.01 MORBID OBESITY (HCC): ICD-10-CM

## 2023-09-19 DIAGNOSIS — S80.01XA CONTUSION OF RIGHT KNEE, INITIAL ENCOUNTER: ICD-10-CM

## 2023-09-19 PROCEDURE — 99213 OFFICE O/P EST LOW 20 MIN: CPT | Performed by: INTERNAL MEDICINE

## 2023-09-19 PROCEDURE — G8417 CALC BMI ABV UP PARAM F/U: HCPCS | Performed by: INTERNAL MEDICINE

## 2023-09-19 PROCEDURE — 1036F TOBACCO NON-USER: CPT | Performed by: INTERNAL MEDICINE

## 2023-09-19 PROCEDURE — 3017F COLORECTAL CA SCREEN DOC REV: CPT | Performed by: INTERNAL MEDICINE

## 2023-09-19 PROCEDURE — G8427 DOCREV CUR MEDS BY ELIG CLIN: HCPCS | Performed by: INTERNAL MEDICINE

## 2023-09-19 RX ORDER — PREDNISONE 5 MG/1
5 TABLET ORAL EVERY MORNING
COMMUNITY
Start: 2023-09-09

## 2023-09-19 RX ORDER — SENNOSIDES 8.6 MG
650 CAPSULE ORAL EVERY 8 HOURS PRN
Qty: 60 TABLET | Refills: 3 | Status: SHIPPED | OUTPATIENT
Start: 2023-09-19

## 2023-09-19 NOTE — PROGRESS NOTES
(7/27/2023)    Overall Financial Resource Strain (CARDIA)     Difficulty of Paying Living Expenses: Not hard at all   Food Insecurity: No Food Insecurity (7/27/2023)    Hunger Vital Sign     Worried About Running Out of Food in the Last Year: Never true     Ran Out of Food in the Last Year: Never true   Transportation Needs: Unknown (7/27/2023)    PRAPARE - Transportation     Lack of Transportation (Medical): Not on file     Lack of Transportation (Non-Medical): No   Physical Activity: Not on file   Stress: Not on file   Social Connections: Not on file   Intimate Partner Violence: Not on file   Housing Stability: Unknown (7/27/2023)    Housing Stability Vital Sign     Unable to Pay for Housing in the Last Year: Not on file     Number of Places Lived in the Last Year: Not on file     Unstable Housing in the Last Year: No      Health Maintenance Due   Topic Date Due    Hepatitis B vaccine (1 of 3 - 3-dose series) Never done    HIV screen  Never done    Hepatitis C screen  Never done    DTaP/Tdap/Td vaccine (1 - Tdap) Never done    Colorectal Cancer Screen  Never done    Lipids  01/19/2020    COVID-19 Vaccine (3 - Pfizer series) 05/14/2021    Shingles vaccine (1 of 2) Never done    Flu vaccine (1) 08/01/2023    lski    Physical Exam  Constitutional:       General: She is not in acute distress. Appearance: Normal appearance. She is obese. HENT:      Head: Normocephalic and atraumatic. Right Ear: Tympanic membrane, ear canal and external ear normal.      Left Ear: Tympanic membrane, ear canal and external ear normal. There is no impacted cerumen. Nose: Nose normal. No congestion or rhinorrhea. Mouth/Throat:      Mouth: Mucous membranes are moist.      Pharynx: Oropharynx is clear. No oropharyngeal exudate or posterior oropharyngeal erythema. Eyes:      General: No scleral icterus. Right eye: No discharge. Left eye: No discharge.       Pupils: Pupils are equal, round, and reactive to

## 2023-09-27 ENCOUNTER — EVALUATION (OUTPATIENT)
Dept: PHYSICAL THERAPY | Age: 53
End: 2023-09-27
Payer: COMMERCIAL

## 2023-09-27 DIAGNOSIS — S80.01XA CONTUSION OF RIGHT KNEE, INITIAL ENCOUNTER: Primary | ICD-10-CM

## 2023-09-27 PROCEDURE — 97163 PT EVAL HIGH COMPLEX 45 MIN: CPT | Performed by: PHYSICAL THERAPIST

## 2023-09-27 NOTE — PROGRESS NOTES
Physical Therapy Daily Treatment Note    Date: 2023  Patient Name: Anabell Christine  : 1970   MRN: [id number]  DOInjury: 2023   DOSx: -  Referring Provider: Rory Vigil MD  [address],  [address] [facility]     Medical Diagnosis:     S80.01XA (ICD-10-CM) - Contusion of right knee, initial encounter    Lady Duncan presents as a medial meniscus injury with positive Deborah and Thessaly testing with medial joint line tenderness. We will start with ROM and strengthening       X = TO BE PERFORMED NEXT VISIT  > = PROGRESS TO THIS    S: See eval  O: Discussed anatomy, physiology, body mechanics, principles of loading, and progressive loading/activity. Reviewed home exercise program extensively; written copy provided. Time 9953-2344     Visit 1 Repeat outcome measure at mid point and end. Pain Pain with activity 6/10     ROM      Modalities         MO   Manual            Stretch      Towel / strap DF, INV, EV   TE      TE   Exercise      nustep x  TE   Toe curls      Heel slides x HEP TE   LAQ x HEP TE   SLR x HEP TE   SAQ x HEP TE   [x] TG  [] Leg Press 2-leg x  TE   [] TG  [] Leg Press 1-leg   TE   Hamstring Curl Machine   TE   Knee Extension Machine   TE   Step-ups - FWD >  TA   Step-ups - LAT   TA   Step-ups - BWD   TA   Calf Raises   TA      TA      TA               A:  Tolerated well.     P: Continue with rehab plan  Ludmila Blue, PT    Treatment Charges: Mins Units   Initial Evaluation 45 1   Re-Evaluation     Ther Exercise         TE     Manual Therapy     MT     Ther Activities        TA     Gait Training          GT     Neuro Re-education NR     Modalities     Non-Billable Service Time     Other     Total Time/Units 45 1
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
## 2023-09-29 ENCOUNTER — TREATMENT (OUTPATIENT)
Dept: PHYSICAL THERAPY | Age: 53
End: 2023-09-29

## 2023-09-29 DIAGNOSIS — S80.01XA CONTUSION OF RIGHT KNEE, INITIAL ENCOUNTER: Primary | ICD-10-CM

## 2023-09-29 NOTE — PROGRESS NOTES
Physical Therapy Daily Treatment Note    Date: 2023  Patient Name: Reynaldo Siddiqui  : 1970   MRN: [id number]  DOInjury: 2023   DOSx: -  Referring Provider: Eleuterio Singh MD  [address],  [address] [facility]     Medical Diagnosis:     S80.01XA (ICD-10-CM) - Contusion of right knee, initial encounter    Maame Hoffman presents as a medial meniscus injury with positive Deborah and Thessaly testing with medial joint line tenderness. We will start with ROM and strengthening       X = TO BE PERFORMED NEXT VISIT  > = PROGRESS TO THIS    Access Code: [email or web address]  URL: [email or web address]  Date: 2023  Prepared by: Derian Newby    Exercises  - Supine Heel Slide  - 2-3 x daily - 3 sets - 15-20 reps  - Seated Long Arc Quad  - 3-4 x weekly - 2-3 sets - 10 reps  - Supine Active Straight Leg Raise  - 3-4 x weekly - 2-3 sets - 10 reps  - Supine Knee Extension Strengthening  - 3-4 x weekly - 2-3 sets - 10 reps    S: Pt reports soreness especially when standing long periods of time, going down stairs, straightening  O: Antalgic gait during ambulation   Time 6417-8462     Visit 2 Repeat outcome measure at mid point and end. Pain Pain with activity 6/10     ROM      Modalities         MO   Manual            Stretch      Towel / strap DF, INV, EV   TE      TE   Exercise      nustep L5 x 12 min  TE   Toe curls      Heel slides X 20 HEP TE   LAQ 3 x 10 HEP TE   SLR 3 x 10 HEP TE   SAQ 3 x 10 HEP TE   [x] TG  [] Leg Press 2-leg L17 3 x 15  TE   [] TG  [] Leg Press 1-leg   TE   Hamstring Curl Machine   TE   Knee Extension Machine   TE   Step-ups - FWD >  TA   Step-ups - LAT   TA   Step-ups - BWD   TA   Calf Raises   TA      TA      TA               A: Tolerated well. Medial knee pain noted at start of each exercise; did dissipate as patient continued.   P: Continue with rehab plan  Derian Newby, PTA    Treatment Charges: Mins Units   Initial Evaluation     Re-Evaluation     Ther Exercise         TE 30 2

## 2023-10-02 ENCOUNTER — TREATMENT (OUTPATIENT)
Dept: PHYSICAL THERAPY | Age: 53
End: 2023-10-02

## 2023-10-02 DIAGNOSIS — S80.01XA CONTUSION OF RIGHT KNEE, INITIAL ENCOUNTER: Primary | ICD-10-CM

## 2023-10-02 NOTE — PROGRESS NOTES
Physical Therapy Daily Treatment Note    Date: 10/2/2023  Patient Name: Rocael Urbano  : 1970   MRN: [id number]  DOInjury: 2023   DOSx: -  Referring Provider: Gisela Javed MD  [address] [facility] [address],  [address] [place] [address]     Medical Diagnosis:     S80.01XA (ICD-10-CM) - Contusion of right knee, initial encounter    Bindu Bajwa presents as a medial meniscus injury with positive Deborah and Thessaly testing with medial joint line tenderness. We will start with ROM and strengthening       X = TO BE PERFORMED NEXT VISIT  > = PROGRESS TO THIS    Access Code: [email or web address]  URL: [email or web address]  Date: 2023  Prepared by: Javier Wakefield    Exercises  - Supine Heel Slide  - 2-3 x daily - 3 sets - 15-20 reps  - Seated Long Arc Quad  - 3-4 x weekly - 2-3 sets - 10 reps  - Supine Active Straight Leg Raise  - 3-4 x weekly - 2-3 sets - 10 reps  - Supine Knee Extension Strengthening  - 3-4 x weekly - 2-3 sets - 10 reps    S: Pt reports knee is not really painful, just aches and is tight  O: Antalgic gait during ambulation   Time 2115-1447     Visit 2 Repeat outcome measure at mid point and end. Pain Pain 2/10     ROM      Modalities         MO   Manual            Stretch      Towel / strap DF, INV, EV   TE      TE   Exercise      nustep L5 x 12 min  TE   Toe curls      Heel slides X 20 HEP TE   LAQ 3 x 10 HEP TE   SLR 3 x 10 HEP TE   SAQ 3 x 10 HEP TE   [x] TG  [] Leg Press 2-leg L17 3 x 15  TE   [] TG  [] Leg Press 1-leg   TE   Hamstring Curl Machine   TE   Knee Extension Machine   TE   Step-ups - FWD >  TA   Step-ups - LAT   TA   Step-ups - BWD   TA   Calf Raises   TA      TA      TA               A: Tolerated well.   No joint pain today, knee just stiff with activity  P: Continue with rehab plan  Javier Wakefield, RYLEE    Treatment Charges: Mins Units   Initial Evaluation     Re-Evaluation     Ther Exercise         TE 30 2   Manual Therapy     MT     Ther Activities        TA     Gait Training          GT

## 2023-10-05 ENCOUNTER — TREATMENT (OUTPATIENT)
Dept: PHYSICAL THERAPY | Age: 53
End: 2023-10-05

## 2023-10-05 DIAGNOSIS — S80.01XA CONTUSION OF RIGHT KNEE, INITIAL ENCOUNTER: Primary | ICD-10-CM

## 2023-10-05 NOTE — PROGRESS NOTES
Physical Therapy Daily Treatment Note    Date: 10/5/2023  Patient Name: Dom Yusuf  : 1970   MRN: [id number]  DOInjury: 2023   DOSx: -  Referring Provider: Carolina Yan MD  [address] [facility],[address],  [address] [facility]     Medical Diagnosis:     S80.01XA (ICD-10-CM) - Contusion of right knee, initial encounter    Javid Gil presents as a medial meniscus injury with positive Deborah and Thessaly testing with medial joint line tenderness. We will start with ROM and strengthening       X = TO BE PERFORMED NEXT VISIT  > = PROGRESS TO THIS    Access Code: [email or web address]  URL: [email or web address]  Date: 2023  Prepared by: Kathalene Schaumann    Exercises  - Supine Heel Slide  - 2-3 x daily - 3 sets - 15-20 reps  - Seated Long Arc Quad  - 3-4 x weekly - 2-3 sets - 10 reps  - Supine Active Straight Leg Raise  - 3-4 x weekly - 2-3 sets - 10 reps  - Supine Knee Extension Strengthening  - 3-4 x weekly - 2-3 sets - 10 reps    S: Pt reports knee has been sore and achy all day today. O:    Time 9867-3676     Visit 4/ Repeat outcome measure at mid point and end. Pain Pain 4/10     ROM      Modalities         MO   Manual            Stretch      Towel / strap DF, INV, EV   TE      TE   Exercise      nustep L5 x 15 min  TE   Toe curls      Heel slides X 20 HEP TE   LAQ 3 x 10 HEP TE   SLR 3 x 10 HEP TE   SAQ 3 x 10 HEP TE   [x] TG  [] Leg Press 2-leg L17 3 x 15  TE   [] TG  [] Leg Press 1-leg   TE   Hamstring Curl Machine   TE   Knee Extension Machine   TE   Step-ups - FWD >  TA   Step-ups - LAT   TA   Step-ups - BWD   TA   Calf Raises   TA      TA      TA               A: Tolerated well. Feels looser following exercise. Pops occasionally with no pain.    P: Continue with rehab plan  Kathalene Schaumann, RYLEE    Treatment Charges: Mins Units   Initial Evaluation     Re-Evaluation     Ther Exercise         TE 30 2   Manual Therapy     MT     Ther Activities        TA     Gait Training          GT     Neuro Re-education NR

## 2023-10-09 ENCOUNTER — HOSPITAL ENCOUNTER (OUTPATIENT)
Dept: PHYSICAL THERAPY | Age: 53
Setting detail: THERAPIES SERIES
Discharge: HOME | End: 2023-10-09
Payer: COMMERCIAL

## 2023-10-09 PROCEDURE — 97110 THERAPEUTIC EXERCISES: CPT

## 2023-10-09 NOTE — PROGRESS NOTES
Physical Therapy Daily Treatment Note    Date: 10/9/2023  Patient Name: Amanda Melendrez  : 1970   MRN: [id number]  DOInjury: 2023   DOSx: -  Referring Provider: Lacey Sánchez MD  [address] [facility],[address],  [address] [facility]     Medical Diagnosis:     S80.01XA (ICD-10-CM) - Contusion of right knee, initial encounter    Flores Arteaga presents as a medial meniscus injury with positive Deborah and Thessaly testing with medial joint line tenderness. We will start with ROM and strengthening     S: Pt reports knee aches medial knee all day. O:    Time 1786-6602 1-2x/week x 4-6 weeks    Visit - Repeat outcome measure at mid point and end. Pain Pain 4/10     ROM -7-0-110 Eval  0-115 10/09/23     Modalities      Manual      Stretch      Towel / strap DF, INV, EV   TE   Exercise      nustep L5 x 10 min  TE   Toe curls      Heel slides  HEP TE   LAQ 2# x 2 min HEP TE   SLR  HEP TE   SAQ  HEP TE   [x] TG  [] Leg Press 2-leg L8 x 3 min  TE   [] TG  [] Leg Press 1-leg   TE   Hamstring Curl Machine   TE   Knee Extension Machine   TE   Step-ups - FWD 4\" x 2 min  TA   Step-ups - LAT 4\" x 2 min  TA   Step-ups - BWD   TA   Calf Raises X 2 min, slow  TA   Marching 1# x 2 min  TE   hip Abd Alt. 1# x 2 min  TE   HS curls Alt. 1# x 2 min  TE         A: Tolerated well. Feels looser following exercise. Pops occasionally with no pain. We discussed gait, patient to try to bend knee for normal gait, has increased ROM. P: Continue with rehab plan.   Floyde Necessary, PTA    Treatment Charges: Mins Units   Initial Evaluation     Re-Evaluation     Ther Exercise         TE 38 3   Manual Therapy     MT     Ther Activities        TA 6 0   Gait Training          GT     Neuro Re-education NR     Modalities     Non-Billable Service Time     Other     Total Time/Units 44 3

## 2023-10-11 ENCOUNTER — HOSPITAL ENCOUNTER (OUTPATIENT)
Dept: PHYSICAL THERAPY | Age: 53
Setting detail: THERAPIES SERIES
Discharge: HOME | End: 2023-10-11
Payer: COMMERCIAL

## 2023-10-11 PROCEDURE — 97110 THERAPEUTIC EXERCISES: CPT

## 2023-10-16 ENCOUNTER — HOSPITAL ENCOUNTER (OUTPATIENT)
Dept: PHYSICAL THERAPY | Age: 53
Setting detail: THERAPIES SERIES
Discharge: HOME | End: 2023-10-16
Payer: COMMERCIAL

## 2023-10-16 ENCOUNTER — TELEPHONE (OUTPATIENT)
Dept: PRIMARY CARE CLINIC | Age: 53
End: 2023-10-16

## 2023-10-16 DIAGNOSIS — G56.01 RIGHT CARPAL TUNNEL SYNDROME: Primary | ICD-10-CM

## 2023-10-16 PROCEDURE — 97110 THERAPEUTIC EXERCISES: CPT

## 2023-10-16 NOTE — TELEPHONE ENCOUNTER
There is no fractures but the so calcifications and recommended MRI I think is better for you to go see an Ortho doctor before we do an MRI I can refer you to Dr. Akua Uribe or doctor of your choice

## 2023-10-16 NOTE — PROGRESS NOTES
Physical Therapy Daily Treatment Note    Date: 10/16/2023  Patient Name: Tanika Renner  : 1970   MRN: [id number]  DOInjury: 2023   DOSx: -  Referring Provider: Joel Reed MD  [address] [facility],[address],  [address] [facility]     Medical Diagnosis:     S80.01XA (ICD-10-CM) - Contusion of right knee, initial encounter    Marvin Huerta presents as a medial meniscus injury with positive Deborah and Thessaly testing with medial joint line tenderness. We will start with ROM and strengthening     S: Pt reports having 3 days off, didn't ache over weekend, no ache currently. She reports feeling good with exercises, more limber, but later she gets ache. O:  Today = week 4   Time 0349-5973 1-2x/week x 4-6 weeks    Visit - Repeat outcome measure at mid point and end. Pain Pain 4/10     ROM -7-0-110 Eval  0-115 10/09/23     Modalities      Manual      Stretch      Towel / strap DF, INV, EV   TE   Exercise      nustep L5 x 10 min, seat #8  TE   Toe curls      Heel slides  HEP TE   LAQ 2# x 2 min Right HEP TE   SLR  HEP TE   SAQ  HEP TE   [x] TG  [] Leg Press 2-leg L8 x 3 min  TE   [] TG  [] Leg Press 1-leg   TE   Hamstring Curl Machine   TE   Knee Extension Machine   TE   Step-ups - FWD 4\" x 2 min  TA   Step-ups - LAT 4\" x 2 min  TA   Step-ups - BWD   TA   Calf Raises X 2 min, slow  TA   Marching 1# x 2 min  TE   hip Abd Alt. 1# x 2 min  TE   HS curls Alt. 1# x 2 min  TE   Hip Ext Alt. 1# x 2 min     A: Tolerated well. Knee pops occasionally with no pain. We discussed gait, patient to try to bend knee for normal gait. P: Continue with rehab plan.   Cate Hinojosa PTA    Treatment Charges: Mins Units   Initial Evaluation     Re-Evaluation     Ther Exercise         TE 32 3   Manual Therapy     MT     Ther Activities        TA 6 0   Gait Training          GT     Neuro Re-education NR     Modalities     Non-Billable Service Time     Other     Total Time/Units 38 3

## 2023-10-16 NOTE — TELEPHONE ENCOUNTER
----- Message from Oli Suárez sent at 10/13/2023 10:50 AM EDT -----  Subject: Results Request    QUESTIONS  Results: [address]; Ordered by: Eleuterio Singh   Date Performed: 2023-09-01  ---------------------------------------------------------------------------  --------------  Dallin LOPEZ    [phone]; OK to leave message on voicemail  ---------------------------------------------------------------------------  --------------

## 2023-10-17 ENCOUNTER — TELEPHONE (OUTPATIENT)
Dept: PRIMARY CARE CLINIC | Age: 53
End: 2023-10-17

## 2023-10-17 DIAGNOSIS — R06.02 SOB (SHORTNESS OF BREATH): Primary | ICD-10-CM

## 2023-10-17 NOTE — TELEPHONE ENCOUNTER
Pt called in concerned of her pft results and fears there is something wrong with her results are in computer

## 2023-10-18 ENCOUNTER — HOSPITAL ENCOUNTER (OUTPATIENT)
Dept: PHYSICAL THERAPY | Age: 53
Setting detail: THERAPIES SERIES
Discharge: HOME | End: 2023-10-18
Payer: COMMERCIAL

## 2023-10-18 PROCEDURE — 97110 THERAPEUTIC EXERCISES: CPT

## 2023-10-18 NOTE — PROGRESS NOTES
Physical Therapy Daily Treatment Note    Date: 10/18/2023  Patient Name: Leanne Leon  : 1970   MRN: [id number]  DOInjury: 2023   DOSx: -  Referring Provider: Fadumo Solis MD  [address] [facility],[address],  [address] [facility]     Medical Diagnosis:     S80.01XA (ICD-10-CM) - Contusion of right knee, initial encounter    Steffen Flowers presents as a medial meniscus injury with positive Deborah and Thessaly testing with medial joint line tenderness. We will start with ROM and strengthening     S: Pt reports no ache currently. She reports feeling good with exercises, more limber, but later she gets ache. Today some increased soreness by end of session. O:  week 4   Time 1525- 1-2x/week x 4-6 weeks    Visit - Repeat outcome measure at mid point and end. Pain Pain 4/10     ROM -7-0-110 Eval  0-115 10/09/23     Modalities      Manual      Stretch      Towel / strap DF, INV, EV   TE   Exercise      nustep L5 x 10 min, seat #8  TE   Toe curls      Heel slides  HEP TE   LAQ 2# x 2 min Right HEP TE   SLR  HEP TE   SAQ  HEP TE   [x] TG  [] Leg Press 2-leg L8 x 3 min  TE   [] TG  [] Leg Press 1-leg   TE   Hamstring Curl Machine   TE   Knee Extension Machine   TE   Step-ups - FWD 6\" x 2 min  TA   Step-ups - LAT 6\" x 2 min  TA   Step-ups - BWD   TA   Calf Raises X 2 min, slow  TA   Marching 1# x 2 min  TE   hip Abd Alt. 1# x 2 min  TE   HS curls Alt. 1# x 2 min  TE   Hip Ext Alt. 1# x 2 min     A: Tolerated well. Patient progressed to 6\" step ups. P: Continue with rehab plan.   Yakov Green PTA    Treatment Charges: Mins Units   Initial Evaluation     Re-Evaluation     Ther Exercise         TE 34 3   Manual Therapy     MT     Ther Activities        TA 6 0   Gait Training          GT     Neuro Re-education NR     Modalities     Non-Billable Service Time     Other     Total Time/Units 40 3

## 2023-10-23 ENCOUNTER — APPOINTMENT (OUTPATIENT)
Dept: PHYSICAL THERAPY | Age: 53
End: 2023-10-23
Payer: COMMERCIAL

## 2023-10-25 ENCOUNTER — APPOINTMENT (OUTPATIENT)
Dept: PHYSICAL THERAPY | Age: 53
End: 2023-10-25
Payer: COMMERCIAL

## 2023-11-01 ENCOUNTER — HOSPITAL ENCOUNTER (OUTPATIENT)
Dept: PHYSICAL THERAPY | Age: 53
Setting detail: THERAPIES SERIES
Discharge: HOME | End: 2023-11-01
Payer: COMMERCIAL

## 2023-11-01 PROCEDURE — 97110 THERAPEUTIC EXERCISES: CPT | Performed by: PHYSICAL THERAPIST

## 2023-11-01 NOTE — PROGRESS NOTES
Physical Therapy Daily Treatment Note    Date: 2023  Patient Name: Claudene Alice  : 1970   MRN: [id number]  DOInjury: 2023   DOSx: -  Referring Provider: Dominique Peoples MD  [address] [facility],[address],  [address] [facility]     Medical Diagnosis:     S80.01XA (ICD-10-CM) - Contusion of right knee, initial encounter    [address] presents as a medial meniscus injury with positive Deborah and Thessaly testing with medial joint line tenderness. We will start with ROM and strengthening     S: Pt reports no ache currently. She reports feeling good with exercises, more limber, but later she gets ache. Today some increased soreness by end of session. O:  week 4   Time 1739-9661 1-2x/week x 4-6 weeks    Visit - Repeat outcome measure at mid point and end. Pain Pain 4/10     ROM -7-0-110 Eval  0-115 10/09/23     Modalities      Manual      Stretch      Towel / strap DF, INV, EV   TE   Exercise      nustep L5 x 10 min, seat #8  TE   Toe curls      Heel slides  HEP TE   LAQ 2# x 2 min Right HEP TE   SLR  HEP TE   SAQ  HEP TE   [x] TG  [] Leg Press 2-leg L8 x 3 min  TE   [] TG  [] Leg Press 1-leg   TE   Hamstring Curl Machine   TE   Knee Extension Machine   TE   Step-ups - FWD 6\" x 2 min  TA   Step-ups - LAT 6\" x 2 min  TA   Step-ups - BWD   TA   Calf Raises X 2 min, slow  TA   Marching 1# x 2 min  TE   hip Abd Alt. 1# x 2 min  TE   HS curls Alt. 1# x 2 min  TE   Hip Ext Alt. 1# x 2 min     A: Tolerated well. Patient progressed to 6\" step ups. P: Continue with rehab plan.   Mary Ellen Correia PT    Treatment Charges: Mins Units   Initial Evaluation     Re-Evaluation     Ther Exercise         TE 34 3   Manual Therapy     MT     Ther Activities        TA 6 0   Gait Training          GT     Neuro Re-education NR     Modalities     Non-Billable Service Time     Other     Total Time/Units 40 3

## 2023-11-02 ENCOUNTER — HOSPITAL ENCOUNTER (OUTPATIENT)
Dept: PHYSICAL THERAPY | Age: 53
Setting detail: THERAPIES SERIES
Discharge: HOME | End: 2023-11-02
Payer: COMMERCIAL

## 2023-11-02 PROCEDURE — 97110 THERAPEUTIC EXERCISES: CPT | Performed by: PHYSICAL THERAPIST

## 2023-11-02 NOTE — PROGRESS NOTES
Physical Therapy Daily Treatment Note    Date: 2023  Patient Name: Rocael Urbano  : 1970   MRN: [id number]  DOInjury: 2023   DOSx: -  Referring Provider: Gisela Javed MD  [address] [facility],[address],  [address] [facility]     Medical Diagnosis:     S80.01XA (ICD-10-CM) - Contusion of right knee, initial encounter    Bindu aBjwa presents as a medial meniscus injury with positive Deborah and Thessaly testing with medial joint line tenderness. We will start with ROM and strengthening     S: Pt reports no ache currently. She reports feeling good with exercises, more limber, but later she gets ache. O:      Time 9625-7628 1-2x/week x 4-6 weeks    Visit  Repeat outcome measure at mid point and end. Pain Pain 4/10     ROM -7-0-110 Eval  0-115 10/09/23     Modalities      Manual      Stretch      Towel / strap DF, INV, EV   TE   Exercise      nustep L5 x 10 min, seat #8  TE   Toe curls      Heel slides  HEP TE   LAQ 2# x 2 min Right HEP TE   SLR  HEP TE   SAQ  HEP TE   [x] TG  [] Leg Press 2-leg L8 x 3 min  TE   [] TG  [] Leg Press 1-leg   TE   Hamstring Curl Machine   TE   Knee Extension Machine   TE   Step-ups - FWD 6\" x 2 min  TA   Step-ups - LAT 6\" x 2 min  TA   Step-ups - BWD   TA   Calf Raises X 2 min, slow  TA   Marching 1# x 2 min  TE   hip Abd Alt. 1# x 2 min  TE   HS curls Alt. 1# x 2 min  TE   Hip Ext Alt. 1# x 2 min     A: Tolerated well. Patient progressed to 6\" step ups. P: Continue with rehab plan.   Willy Torres PT    Treatment Charges: Mins Units   Initial Evaluation     Re-Evaluation     Ther Exercise         TE 34 3   Manual Therapy     MT     Ther Activities        TA 6 0   Gait Training          GT     Neuro Re-education NR     Modalities     Non-Billable Service Time     Other     Total Time/Units 40 3

## 2023-11-08 ENCOUNTER — HOSPITAL ENCOUNTER (OUTPATIENT)
Dept: PHYSICAL THERAPY | Age: 53
Setting detail: THERAPIES SERIES
Discharge: HOME | End: 2023-11-08
Payer: COMMERCIAL

## 2023-11-08 PROCEDURE — 97110 THERAPEUTIC EXERCISES: CPT | Performed by: PHYSICAL THERAPIST

## 2023-11-08 NOTE — PROGRESS NOTES
Physical Therapy Daily Treatment Note    Date: 2023  Patient Name: Shania Fermin  : 1970   MRN: [id number]  DOInjury: 2023   DOSx: -  Referring Provider: Dulce Lopes MD  [address] [facility],[address],  [address] [facility]     Medical Diagnosis:     S80.01XA (ICD-10-CM) - Contusion of right knee, initial encounter    Bailey Daigle presents as a medial meniscus injury with positive Deborah and Thessaly testing with medial joint line tenderness. We will start with ROM and strengthening     S: Pt reports no ache currently. She reports feeling good with exercises, more limber, but later she gets ache. O:      Time 4999-6798 1-2x/week x 4-6 weeks    Visit  Repeat outcome measure at mid point and end. Pain Pain 4/10     ROM -7-0-110 Eval  0-115 10/09/23     Modalities      Manual      Stretch      Towel / strap DF, INV, EV   TE   Exercise      nustep L7 x 10 min, seat #8  TE   Toe curls      Heel slides  HEP TE   LAQ 2# x 2 min Right HEP TE   SLR  HEP TE   SAQ  HEP TE   [x] TG  [] Leg Press 2-leg L8 x 3 min  TE   [] TG  [] Leg Press 1-leg   TE   Hamstring Curl Machine   TE   Knee Extension Machine   TE   Step-ups - FWD 6\" x 2 min  TA   Step-ups - LAT 6\" x 2 min  TA   Step-ups - BWD   TA   Calf Raises X 2 min, slow  TA   Marching 2# x 2 min  TE   hip Abd Alt. 2# x 2 min  TE   HS curls Alt. 2# x 2 min  TE   Hip Ext Alt. 2# x 2 min     A: Tolerated well. Patient progressed to 6\" step ups. P: Continue with rehab plan.   Fortino Santoyo PT    Treatment Charges: Mins Units   Initial Evaluation     Re-Evaluation     Ther Exercise         TE 34 3   Manual Therapy     MT     Ther Activities        TA 6 0   Gait Training          GT     Neuro Re-education NR     Modalities     Non-Billable Service Time     Other     Total Time/Units 40 3

## 2023-11-09 ENCOUNTER — HOSPITAL ENCOUNTER (OUTPATIENT)
Dept: PHYSICAL THERAPY | Age: 53
Setting detail: THERAPIES SERIES
Discharge: HOME | End: 2023-11-09
Payer: COMMERCIAL

## 2023-11-09 PROCEDURE — 97110 THERAPEUTIC EXERCISES: CPT | Performed by: PHYSICAL THERAPIST

## 2023-11-09 NOTE — PROGRESS NOTES
Physical Therapy     DISCHARGE NOTE    PATIENT: Shania Fermin   Date: 11/9/2023  DIAGNOSIS:  S80.01XA (ICD-10-CM) - Contusion of right knee, initial encounter  PHYSICIAN: Dulce Lopes MD  [address] [facility] [address],  [address] [facility]     ATTENDANCE:  12  OUT OF 12  APPOINTMENT FROM  9/27/23 TO 11/09/23  TREATMENTS RECEIVED:  THEREX, GAIT, BALANCE,  HEP,   ROM    INITIAL PROBLEM CURRENT STATUS       PAIN 0-8/10    0-8/10       DECREASED ROM   right knee         0-7-110 DEG   0-115 DEG        DECREASED STRENGTH        Right knee ext= 5-/5    5/5   Antalgic gait RLE Continues  with less frequency     COMMENTS/ RECOMMENDATIONS:  Pt has progressed with increased strength and ROM. Pain is still quite severe at times. Pt encouraged to continue with HEP. Pt is discharged from PT at this time. Further imaging and ortho consult suggested.        [address] [facility] YOU FOR THE OPPORTUNITY TO WORK WITH WITH THIS PATIENT  Fortino Santoyo PT            IF YOU HAVE ANY QUESTIONS OR COMMENTS, PLEASE FEEL FREE TO CONTACT ME AT   [address] [facility],[address]  [address]  [address] [place] [address]  FAX : [phone]  PHONE: [phone]

## 2023-11-09 NOTE — PROGRESS NOTES
Physical Therapy Daily Treatment Note    Date: 2023  Patient Name: Ivana Norris  : 1970   MRN: [id number]  DOInjury: 2023   DOSx: -  Referring Provider: Barry Hawk MD  [address] [facility],[address],  [address] [facility]     Medical Diagnosis:     S80.01XA (ICD-10-CM) - Contusion of right knee, initial encounter    Nathan Bishop presents as a medial meniscus injury with positive Deborah and Thessaly testing with medial joint line tenderness. We will start with ROM and strengthening     S: Pt reports no ache currently. She reports feeling good with exercises, more limber, but later she gets ache. O:      Time 0969-5118 1-2x/week x 4-6 weeks    Visit  Repeat outcome measure at mid point and end. Pain Pain 4/10     ROM -7-0-110 Eval  0-115 10/09/23     Modalities      Manual      Stretch      Towel / strap DF, INV, EV   TE   Exercise      nustep L7 x 10 min, seat #8  TE   Toe curls      Heel slides  HEP TE   LAQ 2# x 2 min Right HEP TE   SLR  HEP TE   SAQ  HEP TE   [x] TG  [] Leg Press 2-leg L8 x 3 min  TE   [] TG  [] Leg Press 1-leg   TE   Hamstring Curl Machine   TE   Knee Extension Machine   TE   Step-ups - FWD 6\" x 2 min  TA   Step-ups - LAT 6\" x 2 min  TA   Step-ups - BWD   TA   Calf Raises X 2 min, slow  TA   Marching 2# x 2 min  TE   hip Abd Alt. 2# x 2 min  TE   HS curls Alt. 2# x 2 min  TE   Hip Ext Alt. 2# x 2 min     A: Tolerated well. Patient progressed to 6\" step ups. P: Continue with rehab plan.   Cleo Sauceda PT    Treatment Charges: Mins Units   Initial Evaluation     Re-Evaluation     Ther Exercise         TE 34 3   Manual Therapy     MT     Ther Activities        TA 6 0   Gait Training          GT     Neuro Re-education NR     Modalities     Non-Billable Service Time     Other     Total Time/Units 40 3

## 2023-11-13 ASSESSMENT — ENCOUNTER SYMPTOMS
RHINORRHEA: 0
FACIAL SWELLING: 0
SINUS PAIN: 0
EYE PAIN: 0
EYE ITCHING: 0
GASTROINTESTINAL NEGATIVE: 1
EYE DISCHARGE: 0
RESPIRATORY NEGATIVE: 1
EYE REDNESS: 0
EYES NEGATIVE: 1
ALLERGIC/IMMUNOLOGIC NEGATIVE: 1

## 2023-11-13 NOTE — PROGRESS NOTES
Tala Carlson (:  1970) is a 48 y.o. female,Established patient, here for evaluation of the following chief complaint(s):  Cough (Covid 2 weeks ago. Had 2 negative tests since but having lingering cough. Non productive currently on tessalon pearls no relief), Back Pain, and Knee Pain (Follow up on right knee)      Subjective   SUBJECTIVE/OBJECTIVE:  HPI  Chronic cough  Left rib contusion  Knee arthuritis  Review of Systems   Constitutional: Negative. Negative for activity change, appetite change, chills, fatigue and fever. HENT: Negative. Negative for congestion, ear pain, facial swelling, hearing loss, postnasal drip, rhinorrhea and sinus pain. Eyes: Negative. Negative for pain, discharge, redness and itching. Respiratory: Negative. Cardiovascular: Negative. Gastrointestinal: Negative. Endocrine: Negative. Genitourinary: Negative. Musculoskeletal: Negative. Skin: Negative. Allergic/Immunologic: Negative. Neurological: Negative. Hematological: Negative. Psychiatric/Behavioral: Negative.        CBC with Differential:    Lab Results   Component Value Date/Time    WBC 11.3 2023 04:32 PM    RBC 4.16 2023 04:32 PM    HGB 12.7 2023 04:32 PM    HCT 38.3 2023 04:32 PM     2023 04:32 PM    MCV 92.1 2023 04:32 PM    MCH 30.5 2023 04:32 PM    MCHC 33.2 2023 04:32 PM    RDW 14.5 2023 04:32 PM    LYMPHOPCT 22.7 2021 10:30 AM    MONOPCT 4.8 2021 10:30 AM    BASOPCT 0.6 2021 10:30 AM    MONOSABS 0.25 2021 10:30 AM    LYMPHSABS 1.19 2021 10:30 AM    EOSABS 0.18 2021 10:30 AM    BASOSABS 0.03 2021 10:30 AM     CMP:    Lab Results   Component Value Date/Time     2023 04:32 PM    K 4.1 2023 04:32 PM    K 3.4 2021 04:11 PM     2023 04:32 PM    CO2 26 2023 04:32 PM    BUN 15 2023 04:32 PM    CREATININE 0.7 2023 04:32 PM    GFRAA

## 2023-11-15 ENCOUNTER — TELEPHONE (OUTPATIENT)
Dept: ORTHOPEDIC SURGERY | Age: 53
End: 2023-11-15

## 2023-11-15 ENCOUNTER — OFFICE VISIT (OUTPATIENT)
Dept: FAMILY MEDICINE CLINIC | Age: 53
End: 2023-11-15
Payer: COMMERCIAL

## 2023-11-15 VITALS
SYSTOLIC BLOOD PRESSURE: 130 MMHG | DIASTOLIC BLOOD PRESSURE: 83 MMHG | OXYGEN SATURATION: 98 % | RESPIRATION RATE: 17 BRPM | HEART RATE: 74 BPM | WEIGHT: 249 LBS | HEIGHT: 66 IN | TEMPERATURE: 97.6 F | BODY MASS INDEX: 40.02 KG/M2

## 2023-11-15 DIAGNOSIS — G56.01 CARPAL TUNNEL SYNDROME OF RIGHT WRIST: Primary | ICD-10-CM

## 2023-11-15 PROCEDURE — 1036F TOBACCO NON-USER: CPT

## 2023-11-15 PROCEDURE — 96372 THER/PROPH/DIAG INJ SC/IM: CPT

## 2023-11-15 PROCEDURE — 99213 OFFICE O/P EST LOW 20 MIN: CPT

## 2023-11-15 PROCEDURE — G8417 CALC BMI ABV UP PARAM F/U: HCPCS

## 2023-11-15 PROCEDURE — G8484 FLU IMMUNIZE NO ADMIN: HCPCS

## 2023-11-15 PROCEDURE — G8427 DOCREV CUR MEDS BY ELIG CLIN: HCPCS

## 2023-11-15 PROCEDURE — 3017F COLORECTAL CA SCREEN DOC REV: CPT

## 2023-11-15 RX ORDER — DEXAMETHASONE SODIUM PHOSPHATE 10 MG/ML
10 INJECTION INTRAMUSCULAR; INTRAVENOUS ONCE
Status: COMPLETED | OUTPATIENT
Start: 2023-11-15 | End: 2023-11-15

## 2023-11-15 RX ORDER — METHYLPREDNISOLONE 4 MG/1
TABLET ORAL
Qty: 1 KIT | Refills: 0 | Status: SHIPPED | OUTPATIENT
Start: 2023-11-15

## 2023-11-15 RX ADMIN — DEXAMETHASONE SODIUM PHOSPHATE 10 MG: 10 INJECTION INTRAMUSCULAR; INTRAVENOUS at 10:49

## 2023-11-15 NOTE — TELEPHONE ENCOUNTER
LOV 2023 Figueroa. Patient calling to request same day appointment with Nurse practitioner for today for RT wrist/carpal tunnel cortisone inject. Patient states hand is numb and could not sleep  last night. Patient states she was instructed by NP to come in for injection whenever she has pain numbness of RT wrist. Please advise patient [phone]. FYI: patient states off work today would like to be seen today.

## 2023-11-16 ENCOUNTER — OFFICE VISIT (OUTPATIENT)
Dept: PULMONOLOGY | Age: 53
End: 2023-11-16
Payer: COMMERCIAL

## 2023-11-16 VITALS
WEIGHT: 256 LBS | BODY MASS INDEX: 41.14 KG/M2 | TEMPERATURE: 97.8 F | OXYGEN SATURATION: 98 % | HEART RATE: 87 BPM | DIASTOLIC BLOOD PRESSURE: 89 MMHG | HEIGHT: 66 IN | SYSTOLIC BLOOD PRESSURE: 157 MMHG

## 2023-11-16 DIAGNOSIS — J84.9 INTERSTITIAL LUNG DISEASE (HCC): ICD-10-CM

## 2023-11-16 DIAGNOSIS — E66.01 MORBID OBESITY (HCC): ICD-10-CM

## 2023-11-16 DIAGNOSIS — R06.00 DYSPNEA, UNSPECIFIED TYPE: Primary | ICD-10-CM

## 2023-11-16 DIAGNOSIS — G47.33 OSA (OBSTRUCTIVE SLEEP APNEA): ICD-10-CM

## 2023-11-16 PROCEDURE — G8417 CALC BMI ABV UP PARAM F/U: HCPCS | Performed by: INTERNAL MEDICINE

## 2023-11-16 PROCEDURE — 99244 OFF/OP CNSLTJ NEW/EST MOD 40: CPT | Performed by: INTERNAL MEDICINE

## 2023-11-16 PROCEDURE — G8427 DOCREV CUR MEDS BY ELIG CLIN: HCPCS | Performed by: INTERNAL MEDICINE

## 2023-11-16 PROCEDURE — G8484 FLU IMMUNIZE NO ADMIN: HCPCS | Performed by: INTERNAL MEDICINE

## 2023-11-16 ASSESSMENT — ENCOUNTER SYMPTOMS
EYES NEGATIVE: 1
SHORTNESS OF BREATH: 1
GASTROINTESTINAL NEGATIVE: 1
COUGH: 1

## 2023-11-16 NOTE — PROGRESS NOTES
Progress Note    Leanne Leon  1970    CC: Shortness of Breath       Shortness of Breath     HP : 48year old female, has been sob, cough and occasional wheezing. Never smoked, her brother has asthma, she is allergic to animals and grass. She has RA  and has been on Methotrexate. She had CVA in the past and had uterine bleeding same time. History of snoring, tiredness and weight gain. She also takes Prednisone, 5 mg daily for RA.      Past Medical History:   Diagnosis Date    Allergic rhinitis     Anemia     Arthritis     rheumatoid     Carpal tunnel syndrome     Cerebral artery occlusion with cerebral infarction ([address])      left hand feels heavy    History of blood transfusion     x5    Hx of blood clots     vaginal    Menometrorrhagia 11/17/2021    Stroke [facility])       Past Surgical History:   Procedure Laterality Date    CARPAL TUNNEL RELEASE Left 02/12/2021    Left Carpal Tunnel Release    CARPAL TUNNEL RELEASE Left 02/12/2021    LEFT CARPAL TUNNEL RELEASE performed by Aidan Umana DO at [address] N/A 11/17/2021    DILATATION AND CURETTAGE HYSTEROSCOPY performed by Tommie Porter MD at [address] (CERVIX STATUS UNKNOWN) Bilateral 12/01/2021    ROBOTIC XI ASSISTED TOTAL LAPAROSCOPIC HYSTERECTOMY WITH BILATERAL SALPINGECTOMY performed by Tommie Porter MD at [address] (CERVIX NOT REMOVED)      SINUS SURGERY  2005    TRANSESOPHAGEAL ECHOCARDIOGRAM N/A 01/17/2022    TRANSESOPHAGEAL ECHOCARDIOGRAM WITH BUBBLE STUDY performed by Allison Antonio MD at [address] [facility] History   Problem Relation Age of Onset    Other Mother     Arthritis Father     Diabetes Father     Other Father     Breast Cancer Paternal Aunt       Social History     Socioeconomic History    Marital status: Single     Spouse name: None    Number of children: None    Years of education: 22    Highest education level: None   Occupational

## 2023-11-16 NOTE — PROGRESS NOTES
Patient to follow up with physician in 2 months. Bronchial Challenge and Chest CT will be scheduled @ [facility]. Orders for Home Sleep Study will be sent to [facility]. Blood work was order, patient took lab slio with her to have done today.

## 2023-11-17 ENCOUNTER — TELEPHONE (OUTPATIENT)
Dept: SLEEP CENTER | Age: 53
End: 2023-11-17

## 2023-11-20 ENCOUNTER — TELEPHONE (OUTPATIENT)
Dept: SLEEP CENTER | Age: 53
End: 2023-11-20

## 2023-11-22 ENCOUNTER — OFFICE VISIT (OUTPATIENT)
Dept: FAMILY MEDICINE CLINIC | Age: 53
End: 2023-11-22
Payer: COMMERCIAL

## 2023-11-22 ENCOUNTER — HOSPITAL ENCOUNTER (OUTPATIENT)
Dept: SLEEP CENTER | Age: 53
Discharge: HOME | End: 2023-11-22
Payer: COMMERCIAL

## 2023-11-22 VITALS
WEIGHT: 256 LBS | HEIGHT: 66 IN | HEART RATE: 84 BPM | TEMPERATURE: 97.8 F | BODY MASS INDEX: 41.14 KG/M2 | OXYGEN SATURATION: 97 % | DIASTOLIC BLOOD PRESSURE: 85 MMHG | RESPIRATION RATE: 17 BRPM | SYSTOLIC BLOOD PRESSURE: 135 MMHG

## 2023-11-22 DIAGNOSIS — G47.33 OSA (OBSTRUCTIVE SLEEP APNEA): ICD-10-CM

## 2023-11-22 DIAGNOSIS — R68.89 FLU-LIKE SYMPTOMS: ICD-10-CM

## 2023-11-22 DIAGNOSIS — J01.10 ACUTE NON-RECURRENT FRONTAL SINUSITIS: Primary | ICD-10-CM

## 2023-11-22 LAB
INFLUENZA A ANTIGEN, POC: NEGATIVE
INFLUENZA B ANTIGEN, POC: NEGATIVE
LOT EXPIRE DATE: NORMAL
LOT KIT NUMBER: NORMAL
SARS-COV-2, POC: NORMAL
VALID INTERNAL CONTROL: NORMAL
VENDOR AND KIT NAME POC: NORMAL

## 2023-11-22 PROCEDURE — G8484 FLU IMMUNIZE NO ADMIN: HCPCS

## 2023-11-22 PROCEDURE — 1036F TOBACCO NON-USER: CPT

## 2023-11-22 PROCEDURE — 3017F COLORECTAL CA SCREEN DOC REV: CPT

## 2023-11-22 PROCEDURE — 87428 SARSCOV & INF VIR A&B AG IA: CPT

## 2023-11-22 PROCEDURE — G8428 CUR MEDS NOT DOCUMENT: HCPCS

## 2023-11-22 PROCEDURE — 99213 OFFICE O/P EST LOW 20 MIN: CPT

## 2023-11-22 PROCEDURE — G8417 CALC BMI ABV UP PARAM F/U: HCPCS

## 2023-11-22 PROCEDURE — 95800 SLP STDY UNATTENDED: CPT

## 2023-11-22 RX ORDER — AZITHROMYCIN 250 MG/1
TABLET, FILM COATED ORAL
Qty: 6 TABLET | Refills: 0 | Status: SHIPPED | OUTPATIENT
Start: 2023-11-22

## 2023-11-22 RX ORDER — BROMPHENIRAMINE MALEATE, PSEUDOEPHEDRINE HYDROCHLORIDE, AND DEXTROMETHORPHAN HYDROBROMIDE 2; 30; 10 MG/5ML; MG/5ML; MG/5ML
SYRUP ORAL
Qty: 180 ML | Refills: 0 | Status: SHIPPED | OUTPATIENT
Start: 2023-11-22

## 2023-11-27 ENCOUNTER — TELEMEDICINE (OUTPATIENT)
Dept: PRIMARY CARE CLINIC | Age: 53
End: 2023-11-27
Payer: COMMERCIAL

## 2023-11-27 DIAGNOSIS — U07.1 COVID-19: Primary | ICD-10-CM

## 2023-11-27 PROCEDURE — 99213 OFFICE O/P EST LOW 20 MIN: CPT | Performed by: NURSE PRACTITIONER

## 2023-11-27 PROCEDURE — 3017F COLORECTAL CA SCREEN DOC REV: CPT | Performed by: NURSE PRACTITIONER

## 2023-11-27 PROCEDURE — G8484 FLU IMMUNIZE NO ADMIN: HCPCS | Performed by: NURSE PRACTITIONER

## 2023-11-27 PROCEDURE — G8427 DOCREV CUR MEDS BY ELIG CLIN: HCPCS | Performed by: NURSE PRACTITIONER

## 2023-11-27 PROCEDURE — 1036F TOBACCO NON-USER: CPT | Performed by: NURSE PRACTITIONER

## 2023-11-27 PROCEDURE — G8417 CALC BMI ABV UP PARAM F/U: HCPCS | Performed by: NURSE PRACTITIONER

## 2023-11-27 RX ORDER — BENZONATATE 200 MG/1
200 CAPSULE ORAL 3 TIMES DAILY PRN
Qty: 30 CAPSULE | Refills: 0 | Status: SHIPPED | OUTPATIENT
Start: 2023-11-27 | End: 2023-12-07

## 2023-11-27 ASSESSMENT — ENCOUNTER SYMPTOMS
WHEEZING: 0
SORE THROAT: 0
COUGH: 1
NAUSEA: 0
SINUS PRESSURE: 0
ABDOMINAL DISTENTION: 0
VOMITING: 0
SHORTNESS OF BREATH: 1
DIARRHEA: 0
SWOLLEN GLANDS: 1

## 2023-11-27 NOTE — PROGRESS NOTES
[facility] (:  1970) is a Established patient, here for evaluation of the following:    Positive For Covid-19 (Symptoms started Saturday, tested positive )       Assessment & Plan:  Below is the assessment and plan developed based on review of pertinent history, physical exam, labs, studies, and medications. 1. COVID-19  -     nirmatrelvir/ritonavir 300/100 (PAXLOVID) 20 x 150 MG & 10 x 100MG TBPK; Take 3 tablets (two 150 mg nirmatrelvir and one 100 mg ritonavir tablets) by mouth every 12 hours for 5 days. , Disp-30 tablet, R-0Normal  -     benzonatate (TESSALON) 200 MG capsule; Take 1 capsule by mouth 3 times daily as needed for Cough, Disp-30 capsule, R-0Normal  Reviewed labs, medications for interactions, and recent notes to confirm that it is safe for the patient to take Paxlovid. The patient was educated on reasons to seek urgent medical care including chest pain, shortness of breath or difficulty breathing at rest, severe pain, fever >102 not controlled by medication, unrelenting vomiting or diarrhea, blue-tinged lips or nailbeds, and severe weakness or confusion. Advised patient on how to contact the virtualist using Appformat, isolation guidelines, and symptom management. Patient verbalized understanding. The patient would benefit from future follow up with their usual PCP. As of the end of their Virtualist Visit today, follow up visit status is as follows: Patient to follow up as previously instructed by PCP  Patient verbalized understanding    Return in about 2 weeks (around 2023). Subjective:   Taking bromfed, no longer effective    Positive For Covid-19  This is a new problem. The current episode started yesterday. The problem occurs constantly. The problem has been gradually worsening. Associated symptoms include congestion, coughing, headaches, swollen glands and weakness.  Pertinent negatives include no chills, diaphoresis, fatigue, fever, myalgias, nausea, sore

## 2023-12-09 ENCOUNTER — HOSPITAL ENCOUNTER (OUTPATIENT)
Dept: MAMMOGRAPHY | Age: 53
End: 2023-12-09
Attending: OBSTETRICS & GYNECOLOGY
Payer: COMMERCIAL

## 2023-12-09 VITALS — BODY MASS INDEX: 36.96 KG/M2 | HEIGHT: 66 IN | WEIGHT: 230 LBS

## 2023-12-09 DIAGNOSIS — Z12.31 SCREENING MAMMOGRAM FOR HIGH-RISK PATIENT: ICD-10-CM

## 2023-12-09 PROCEDURE — 77063 BREAST TOMOSYNTHESIS BI: CPT

## 2023-12-12 ENCOUNTER — OFFICE VISIT (OUTPATIENT)
Dept: PRIMARY CARE CLINIC | Age: 53
End: 2023-12-12
Payer: COMMERCIAL

## 2023-12-12 VITALS
TEMPERATURE: 98.1 F | HEART RATE: 81 BPM | DIASTOLIC BLOOD PRESSURE: 72 MMHG | WEIGHT: 256 LBS | SYSTOLIC BLOOD PRESSURE: 126 MMHG | HEIGHT: 66 IN | OXYGEN SATURATION: 98 % | BODY MASS INDEX: 41.14 KG/M2

## 2023-12-12 DIAGNOSIS — M17.11 PRIMARY OSTEOARTHRITIS OF RIGHT KNEE: ICD-10-CM

## 2023-12-12 DIAGNOSIS — S39.012A BACK STRAIN, INITIAL ENCOUNTER: Primary | ICD-10-CM

## 2023-12-12 DIAGNOSIS — E66.01 MORBID OBESITY (HCC): ICD-10-CM

## 2023-12-12 DIAGNOSIS — S20.212A RIB CONTUSION, LEFT, INITIAL ENCOUNTER: ICD-10-CM

## 2023-12-12 DIAGNOSIS — I63.00 CEREBROVASCULAR ACCIDENT (CVA) DUE TO THROMBOSIS OF PRECEREBRAL ARTERY (HCC): ICD-10-CM

## 2023-12-12 PROCEDURE — 3017F COLORECTAL CA SCREEN DOC REV: CPT | Performed by: INTERNAL MEDICINE

## 2023-12-12 PROCEDURE — G8417 CALC BMI ABV UP PARAM F/U: HCPCS | Performed by: INTERNAL MEDICINE

## 2023-12-12 PROCEDURE — G8427 DOCREV CUR MEDS BY ELIG CLIN: HCPCS | Performed by: INTERNAL MEDICINE

## 2023-12-12 PROCEDURE — G8484 FLU IMMUNIZE NO ADMIN: HCPCS | Performed by: INTERNAL MEDICINE

## 2023-12-12 PROCEDURE — 1036F TOBACCO NON-USER: CPT | Performed by: INTERNAL MEDICINE

## 2023-12-12 PROCEDURE — 99214 OFFICE O/P EST MOD 30 MIN: CPT | Performed by: INTERNAL MEDICINE

## 2023-12-12 RX ORDER — GABAPENTIN 300 MG/1
300 CAPSULE ORAL NIGHTLY
Qty: 30 CAPSULE | Refills: 1 | Status: SHIPPED | OUTPATIENT
Start: 2023-12-12 | End: 2024-02-10

## 2023-12-12 RX ORDER — PREDNISONE 20 MG/1
20 TABLET ORAL 2 TIMES DAILY
Qty: 30 TABLET | Refills: 0 | Status: SHIPPED | OUTPATIENT
Start: 2023-12-12 | End: 2023-12-27

## 2023-12-14 ENCOUNTER — HOSPITAL ENCOUNTER (OUTPATIENT)
Age: 53
Discharge: HOME | End: 2023-12-14
Payer: COMMERCIAL

## 2023-12-14 ENCOUNTER — HOSPITAL ENCOUNTER (OUTPATIENT)
Age: 53
Discharge: HOME | End: 2023-12-16
Payer: COMMERCIAL

## 2023-12-14 ENCOUNTER — HOSPITAL ENCOUNTER (OUTPATIENT)
Dept: GENERAL RADIOLOGY | Age: 53
Discharge: HOME | End: 2023-12-16
Payer: COMMERCIAL

## 2023-12-14 DIAGNOSIS — R06.00 DYSPNEA, UNSPECIFIED TYPE: ICD-10-CM

## 2023-12-14 DIAGNOSIS — S20.212A RIB CONTUSION, LEFT, INITIAL ENCOUNTER: ICD-10-CM

## 2023-12-14 LAB
ALBUMIN SERPL-MCNC: 4.1 G/DL (ref 3.5–5.2)
ALP SERPL-CCNC: 68 U/L (ref 35–104)
ALT SERPL-CCNC: 31 U/L (ref 0–32)
ANION GAP SERPL CALCULATED.3IONS-SCNC: 10 MMOL/L (ref 7–16)
AST SERPL-CCNC: 25 U/L (ref 0–31)
BILIRUB SERPL-MCNC: 0.4 MG/DL (ref 0–1.2)
BUN SERPL-MCNC: 19 MG/DL (ref 6–20)
CALCIUM SERPL-MCNC: 9 MG/DL (ref 8.6–10.2)
CHLORIDE SERPL-SCNC: 105 MMOL/L (ref 98–107)
CO2 SERPL-SCNC: 24 MMOL/L (ref 22–29)
CREAT SERPL-MCNC: 0.7 MG/DL (ref 0.5–1)
ERYTHROCYTE [DISTWIDTH] IN BLOOD BY AUTOMATED COUNT: 15.1 % (ref 11.5–15)
ERYTHROCYTE [SEDIMENTATION RATE] IN BLOOD BY WESTERGREN METHOD: 50 MM/HR (ref 0–20)
GFR SERPL CREATININE-BSD FRML MDRD: >60 ML/MIN/1.73M2
GLUCOSE SERPL-MCNC: 100 MG/DL (ref 74–99)
HCT VFR BLD AUTO: 37.8 % (ref 34–48)
HGB BLD-MCNC: 12.7 G/DL (ref 11.5–15.5)
MCH RBC QN AUTO: 30.5 PG (ref 26–35)
MCHC RBC AUTO-ENTMCNC: 33.6 G/DL (ref 32–34.5)
MCV RBC AUTO: 90.6 FL (ref 80–99.9)
PLATELET # BLD AUTO: 357 K/UL (ref 130–450)
PMV BLD AUTO: 9.4 FL (ref 7–12)
POTASSIUM SERPL-SCNC: 3.8 MMOL/L (ref 3.5–5)
PROT SERPL-MCNC: 7.4 G/DL (ref 6.4–8.3)
RBC # BLD AUTO: 4.17 M/UL (ref 3.5–5.5)
SODIUM SERPL-SCNC: 139 MMOL/L (ref 132–146)
WBC OTHER # BLD: 14.2 K/UL (ref 4.5–11.5)

## 2023-12-14 PROCEDURE — 71101 X-RAY EXAM UNILAT RIBS/CHEST: CPT

## 2023-12-14 PROCEDURE — 80053 COMPREHEN METABOLIC PANEL: CPT

## 2023-12-14 PROCEDURE — 86003 ALLG SPEC IGE CRUDE XTRC EA: CPT

## 2023-12-14 PROCEDURE — 82785 ASSAY OF IGE: CPT

## 2023-12-14 PROCEDURE — 36415 COLL VENOUS BLD VENIPUNCTURE: CPT

## 2023-12-14 PROCEDURE — 85027 COMPLETE CBC AUTOMATED: CPT

## 2023-12-14 PROCEDURE — 85652 RBC SED RATE AUTOMATED: CPT

## 2023-12-16 LAB
A ALTERNATA IGE QN: NORMAL KU/L (ref 0–0.34)
A FUMIGATUS IGE QN: NORMAL KU/L (ref 0–0.34)
ALLERGEN BIRCH IGE: NORMAL KU/L (ref 0–0.34)
BERMUDA GRASS IGE QN: NORMAL KU/L (ref 0–0.34)
BOXELDER IGE QN: NORMAL KU/L (ref 0–0.34)
C HERBARUM IGE QN: NORMAL KUL/L (ref 0–0.34)
CALIF WALNUT POLN IGE QN: NORMAL KU/L (ref 0–0.34)
CAT DANDER IGE QN: NORMAL KU/L (ref 0–0.34)
CMN PIGWEED IGE QN: NORMAL KU/L (ref 0–0.34)
COMMON RAGWEED IGE QN: NORMAL KU/L (ref 0–0.34)
COTTONWOOD IGE QN: NORMAL KU/L (ref 0–0.34)
D FARINAE IGE QN: NORMAL KU/L (ref 0–0.34)
D PTERONYSS IGE QN: NORMAL KU/L (ref 0–0.34)
DOG DANDER IGE QN: NORMAL KU/L (ref 0–0.34)
IGE SERPL-ACNC: 4 IU/ML
IGE SERPL-ACNC: 4 IU/ML
LONDON PLANE IGE QN: NORMAL KU/L (ref 0–0.34)
M RACEMOSUS IGE QN: NORMAL KU/L (ref 0–0.34)
MOUSE EPITH IGE QN: NORMAL KU/L (ref 0–0.34)
MT JUNIPER IGE QN: NORMAL KU/L (ref 0–0.34)
P NOTATUM IGE QN: NORMAL KU/L (ref 0–0.34)
PECAN/HICK TREE IGE QN: NORMAL KU/L (ref 0–0.34)
ROACH IGE QN: NORMAL KU/L (ref 0–0.34)
SALTWORT IGE QN: NORMAL KU/L (ref 0–0.34)
SHEEP SORREL IGE QN: NORMAL KU/L (ref 0–0.34)
TIMOTHY IGE QN: NORMAL KU/L (ref 0–0.34)
WHITE ASH IGE QN: NORMAL KU/L (ref 0–0.34)
WHITE ELM IGE QN: NORMAL KU/L (ref 0–0.34)
WHITE MULBERRY IGE QN: NORMAL KU/L (ref 0–0.34)
WHITE OAK IGE QN: NORMAL KU/L (ref 0–0.34)

## 2023-12-27 PROBLEM — R06.00 DYSPNEA: Status: ACTIVE | Noted: 2023-07-27

## 2024-01-16 ENCOUNTER — OFFICE VISIT (OUTPATIENT)
Dept: ORTHOPEDIC SURGERY | Age: 54
End: 2024-01-16

## 2024-01-16 VITALS — WEIGHT: 256 LBS | HEIGHT: 66 IN | TEMPERATURE: 98 F | BODY MASS INDEX: 41.14 KG/M2

## 2024-01-16 DIAGNOSIS — G56.01 RIGHT CARPAL TUNNEL SYNDROME: Primary | ICD-10-CM

## 2024-01-16 RX ORDER — PREDNISONE 5 MG/1
TABLET ORAL
COMMUNITY
Start: 2024-01-10

## 2024-01-16 NOTE — PROGRESS NOTES
Chief Complaint   Patient presents with    Wrist Pain     Right wrist f/u. LOV 9/5/23 with de quervain CSI; offered relief.        Mar Blanca is a 54 y.o. year old  female who presents for follow up right wrist pain. She stated she has been having numbness and tingling in the first three fingers that has been bothering her for a few months. She has trouble sleeping at night.    Past Medical History:   Diagnosis Date    Allergic rhinitis     Anemia     Anxiety     Arthritis     rheumatoid     Carpal tunnel syndrome     Cerebral artery occlusion with cerebral infarction (HCC)      left hand feels heavy    Cerebrovascular disease 10/2021    History of blood transfusion     x5    Hx of blood clots     vaginal    Menometrorrhagia 11/17/2021    Obesity     Stroke (HCC)      Past Surgical History:   Procedure Laterality Date    CARPAL TUNNEL RELEASE Left 02/12/2021    Left Carpal Tunnel Release    CARPAL TUNNEL RELEASE Left 02/12/2021    LEFT CARPAL TUNNEL RELEASE performed by Sam Figueroa DO at [facility] OR    DILATION AND CURETTAGE OF UTERUS N/A 11/17/2021    DILATATION AND CURETTAGE HYSTEROSCOPY performed by Elías Hodges MD at [facility] OR    HYSTERECTOMY (CERVIX STATUS UNKNOWN) Bilateral 12/01/2021    ROBOTIC XI ASSISTED TOTAL LAPAROSCOPIC HYSTERECTOMY WITH BILATERAL SALPINGECTOMY performed by Elías Hodges MD at [facility] OR    HYSTERECTOMY, VAGINAL  12/2021    PARTIAL HYSTERECTOMY (CERVIX NOT REMOVED)      SINUS SURGERY  2005    TRANSESOPHAGEAL ECHOCARDIOGRAM N/A 01/17/2022    TRANSESOPHAGEAL ECHOCARDIOGRAM WITH BUBBLE STUDY performed by Ariel Sabillon MD at [facility] ENDOSCOPY       Current Outpatient Medications:     predniSONE (DELTASONE) 5 MG tablet, TAKE ONE TABLET BY MOUTH EVERY DAY IN THE MORNING, Disp: , Rfl:     gabapentin (NEURONTIN) 300 MG capsule, Take 1 capsule by mouth at bedtime for 60 days. Intended supply: 30 days, Disp: 30 capsule, Rfl: 1    acetaminophen (TYLENOL 8 HOUR

## 2024-01-18 ENCOUNTER — OFFICE VISIT (OUTPATIENT)
Dept: PRIMARY CARE CLINIC | Age: 54
End: 2024-01-18

## 2024-01-18 VITALS
DIASTOLIC BLOOD PRESSURE: 80 MMHG | HEART RATE: 76 BPM | OXYGEN SATURATION: 92 % | SYSTOLIC BLOOD PRESSURE: 128 MMHG | WEIGHT: 254 LBS | HEIGHT: 66 IN | BODY MASS INDEX: 40.82 KG/M2 | TEMPERATURE: 97.5 F

## 2024-01-18 DIAGNOSIS — Z86.73 HISTORY OF STROKE: ICD-10-CM

## 2024-01-18 DIAGNOSIS — F43.23 ADJUSTMENT DISORDER WITH MIXED ANXIETY AND DEPRESSED MOOD: ICD-10-CM

## 2024-01-18 DIAGNOSIS — G47.33 OSA (OBSTRUCTIVE SLEEP APNEA): ICD-10-CM

## 2024-01-18 DIAGNOSIS — M05.711 RHEUMATOID ARTHRITIS INVOLVING RIGHT SHOULDER WITH POSITIVE RHEUMATOID FACTOR (HCC): ICD-10-CM

## 2024-01-18 DIAGNOSIS — J33.9 NASAL POLYP: ICD-10-CM

## 2024-01-18 DIAGNOSIS — I63.00 CEREBROVASCULAR ACCIDENT (CVA) DUE TO THROMBOSIS OF PRECEREBRAL ARTERY (HCC): Primary | ICD-10-CM

## 2024-01-18 DIAGNOSIS — E66.01 MORBID OBESITY (HCC): ICD-10-CM

## 2024-01-18 RX ORDER — CLONAZEPAM 0.5 MG/1
0.5 TABLET ORAL 2 TIMES DAILY PRN
COMMUNITY
Start: 2024-01-15

## 2024-01-18 RX ORDER — FLUTICASONE PROPIONATE 50 MCG
2 SPRAY, SUSPENSION (ML) NASAL DAILY
Qty: 48 G | Refills: 1 | Status: SHIPPED | OUTPATIENT
Start: 2024-01-18

## 2024-01-18 ASSESSMENT — PATIENT HEALTH QUESTIONNAIRE - PHQ9
SUM OF ALL RESPONSES TO PHQ QUESTIONS 1-9: 0
SUM OF ALL RESPONSES TO PHQ QUESTIONS 1-9: 0
SUM OF ALL RESPONSES TO PHQ9 QUESTIONS 1 & 2: 0
2. FEELING DOWN, DEPRESSED OR HOPELESS: 0
SUM OF ALL RESPONSES TO PHQ QUESTIONS 1-9: 0
1. LITTLE INTEREST OR PLEASURE IN DOING THINGS: 0
SUM OF ALL RESPONSES TO PHQ QUESTIONS 1-9: 0

## 2024-01-18 NOTE — PROGRESS NOTES
Mar Blanca (:  1970) is a 54 y.o. female,Established patient, here for evaluation of the following chief complaint(s):  Check-Up      Subjective   SUBJECTIVE/OBJECTIVE:  HPI  Patient with known history of a stroke 2 years  ago who has an anxiety and anxiety has been getting worse she is taking her medications no medication side effects patient stating that she is not able to do her work responsibilities and she goes on every day and starts crying she is not suicidal or homicidal.  She lives alone with her dog and boyfriend comes on weekends.  Is currently on aspirin and atorvastatin her blood pressure is fairly well controlled she is take BuSpar for anxiety      Review of Systems   Constitutional: Negative.  Negative for activity change, appetite change, chills, fatigue and fever.   HENT: Negative.  Negative for congestion, ear pain, facial swelling, hearing loss, postnasal drip, rhinorrhea and sinus pain.    Eyes: Negative.  Negative for pain, discharge, redness and itching.   Respiratory: Negative.     Cardiovascular: Negative.    Gastrointestinal: Negative.    Endocrine: Negative.    Genitourinary: Negative.         Status post partial hysterectomy for abnormal uterine bleeding ovaries are not removed   Musculoskeletal: Negative.         Rheumatoid arthritis following up with Dr. Chambers   Skin: Negative.    Allergic/Immunologic: Negative.    Neurological: Negative.    Hematological: Negative.    Psychiatric/Behavioral: Negative.          In HPI              Objective   /80   Pulse 76   Temp 97.5 °F (36.4 °C) (Temporal)   Ht 1.676 m (5' 5.98\")   Wt 115.2 kg (254 lb)   LMP 2021   SpO2 92%   BMI 41.02 kg/m²   Current Outpatient Medications   Medication Sig Dispense Refill    clonazePAM (KLONOPIN) 0.5 MG tablet Take 1 tablet by mouth 2 times daily as needed. Max Daily Amount: 1 mg      predniSONE (DELTASONE) 5 MG tablet TAKE ONE TABLET BY MOUTH EVERY DAY IN THE MORNING      
round, and reactive to light.   Neck:      Vascular: No carotid bruit.   Cardiovascular:      Rate and Rhythm: Normal rate and regular rhythm.      Pulses: Normal pulses.      Heart sounds: No murmur heard.     No gallop.   Pulmonary:      Effort: Pulmonary effort is normal. No respiratory distress.      Breath sounds: Normal breath sounds. No wheezing, rhonchi or rales.   Chest:      Chest wall: No tenderness.   Abdominal:      General: Bowel sounds are normal.      Palpations: Abdomen is soft. There is no mass.      Tenderness: There is no abdominal tenderness. There is no guarding.      Hernia: No hernia is present.   Musculoskeletal:         General: No swelling, tenderness, deformity or signs of injury.      Cervical back: Normal range of motion and neck supple. No rigidity or tenderness.      Right lower leg: No edema.      Left lower leg: No edema.      Comments: Right knee arthuritis   Lymphadenopathy:      Cervical: No cervical adenopathy.   Skin:     General: Skin is warm and dry.      Capillary Refill: Capillary refill takes 2 to 3 seconds.      Findings: No bruising, lesion or rash.   Neurological:      General: No focal deficit present.      Mental Status: She is alert and oriented to person, place, and time.      Sensory: No sensory deficit.      Motor: No weakness.      Gait: Gait normal.   Psychiatric:         Mood and Affect: Mood normal.         Behavior: Behavior normal.         Thought Content: Thought content normal.         Judgment: Judgment normal.               ASSESSMENT/PLAN:  1. Cerebrovascular accident (CVA) due to thrombosis of precerebral artery (HCC)  -     CBC with Auto Differential; Future  -     Lipid Panel; Future  -     Comprehensive Metabolic Panel; Future  -     07481 - AR Duplex Scan Extracranial, Yossi  2. Rheumatoid arthritis involving right shoulder with positive rheumatoid factor (HCC)  3. Adjustment disorder with mixed anxiety and depressed mood  4. History of stroke  -

## 2024-01-20 ENCOUNTER — HOSPITAL ENCOUNTER (OUTPATIENT)
Age: 54
Discharge: HOME | End: 2024-01-20
Payer: COMMERCIAL

## 2024-01-20 DIAGNOSIS — I63.00 CEREBROVASCULAR ACCIDENT (CVA) DUE TO THROMBOSIS OF PRECEREBRAL ARTERY (HCC): ICD-10-CM

## 2024-01-20 DIAGNOSIS — Z86.73 HISTORY OF STROKE: ICD-10-CM

## 2024-01-20 LAB
ALBUMIN SERPL-MCNC: 4.3 G/DL (ref 3.5–5.2)
ALP SERPL-CCNC: 66 U/L (ref 35–104)
ALT SERPL-CCNC: 30 U/L (ref 0–32)
ANION GAP SERPL CALCULATED.3IONS-SCNC: 7 MMOL/L (ref 7–16)
AST SERPL-CCNC: 20 U/L (ref 0–31)
BASOPHILS # BLD: 0.04 K/UL (ref 0–0.2)
BASOPHILS NFR BLD: 0 % (ref 0–2)
BILIRUB SERPL-MCNC: 0.6 MG/DL (ref 0–1.2)
BUN SERPL-MCNC: 17 MG/DL (ref 6–20)
CALCIUM SERPL-MCNC: 9 MG/DL (ref 8.6–10.2)
CHLORIDE SERPL-SCNC: 103 MMOL/L (ref 98–107)
CHOLEST SERPL-MCNC: 262 MG/DL
CO2 SERPL-SCNC: 27 MMOL/L (ref 22–29)
CREAT SERPL-MCNC: 0.7 MG/DL (ref 0.5–1)
EOSINOPHIL # BLD: 0.07 K/UL (ref 0.05–0.5)
EOSINOPHILS RELATIVE PERCENT: 1 % (ref 0–6)
ERYTHROCYTE [DISTWIDTH] IN BLOOD BY AUTOMATED COUNT: 15.3 % (ref 11.5–15)
GFR SERPL CREATININE-BSD FRML MDRD: >60 ML/MIN/1.73M2
GLUCOSE SERPL-MCNC: 108 MG/DL (ref 74–99)
HCT VFR BLD AUTO: 41.1 % (ref 34–48)
HDLC SERPL-MCNC: 79 MG/DL
HGB BLD-MCNC: 13.4 G/DL (ref 11.5–15.5)
IMM GRANULOCYTES # BLD AUTO: 0.04 K/UL (ref 0–0.58)
IMM GRANULOCYTES NFR BLD: 0 % (ref 0–5)
LDLC SERPL CALC-MCNC: 161 MG/DL
LYMPHOCYTES NFR BLD: 1.68 K/UL (ref 1.5–4)
LYMPHOCYTES RELATIVE PERCENT: 16 % (ref 20–42)
MCH RBC QN AUTO: 30 PG (ref 26–35)
MCHC RBC AUTO-ENTMCNC: 32.6 G/DL (ref 32–34.5)
MCV RBC AUTO: 91.9 FL (ref 80–99.9)
MONOCYTES NFR BLD: 0.79 K/UL (ref 0.1–0.95)
MONOCYTES NFR BLD: 7 % (ref 2–12)
NEUTROPHILS NFR BLD: 75 % (ref 43–80)
NEUTS SEG NFR BLD: 8.04 K/UL (ref 1.8–7.3)
PLATELET # BLD AUTO: 349 K/UL (ref 130–450)
PMV BLD AUTO: 9.5 FL (ref 7–12)
POTASSIUM SERPL-SCNC: 4.3 MMOL/L (ref 3.5–5)
PROT SERPL-MCNC: 7.4 G/DL (ref 6.4–8.3)
RBC # BLD AUTO: 4.47 M/UL (ref 3.5–5.5)
SODIUM SERPL-SCNC: 137 MMOL/L (ref 132–146)
TRIGL SERPL-MCNC: 112 MG/DL
VLDLC SERPL CALC-MCNC: 22 MG/DL
WBC OTHER # BLD: 10.7 K/UL (ref 4.5–11.5)

## 2024-01-20 PROCEDURE — 80061 LIPID PANEL: CPT

## 2024-01-20 PROCEDURE — 36415 COLL VENOUS BLD VENIPUNCTURE: CPT

## 2024-01-20 PROCEDURE — 80053 COMPREHEN METABOLIC PANEL: CPT

## 2024-01-20 PROCEDURE — 85025 COMPLETE CBC W/AUTO DIFF WBC: CPT

## 2024-01-22 ENCOUNTER — TELEPHONE (OUTPATIENT)
Dept: PRIMARY CARE CLINIC | Age: 54
End: 2024-01-22

## 2024-01-22 NOTE — TELEPHONE ENCOUNTER
Left message for pt to call back, she needs to be seen by Dr Woody she never showed for her follow up visit.  He will order the titration study needed and also follow up on her with the other testing that he had ordered.     Pt can call [phone] to schedule appt

## 2024-01-23 NOTE — TELEPHONE ENCOUNTER
Pt called back and told above she said she knew dr henriquez phone number but would call back if she needed the number to schedule

## 2024-02-01 DIAGNOSIS — M25.561 RIGHT KNEE PAIN, UNSPECIFIED CHRONICITY: Primary | ICD-10-CM

## 2024-02-05 ENCOUNTER — OFFICE VISIT (OUTPATIENT)
Dept: ORTHOPEDIC SURGERY | Age: 54
End: 2024-02-05
Payer: COMMERCIAL

## 2024-02-05 VITALS — WEIGHT: 254 LBS | TEMPERATURE: 98 F | HEIGHT: 66 IN | BODY MASS INDEX: 40.82 KG/M2

## 2024-02-05 DIAGNOSIS — S83.241A ACUTE MEDIAL MENISCUS TEAR, RIGHT, INITIAL ENCOUNTER: Primary | ICD-10-CM

## 2024-02-05 PROCEDURE — G8484 FLU IMMUNIZE NO ADMIN: HCPCS | Performed by: ORTHOPAEDIC SURGERY

## 2024-02-05 PROCEDURE — 3017F COLORECTAL CA SCREEN DOC REV: CPT | Performed by: ORTHOPAEDIC SURGERY

## 2024-02-05 PROCEDURE — G8427 DOCREV CUR MEDS BY ELIG CLIN: HCPCS | Performed by: ORTHOPAEDIC SURGERY

## 2024-02-05 PROCEDURE — 99213 OFFICE O/P EST LOW 20 MIN: CPT | Performed by: ORTHOPAEDIC SURGERY

## 2024-02-05 PROCEDURE — 1036F TOBACCO NON-USER: CPT | Performed by: ORTHOPAEDIC SURGERY

## 2024-02-05 PROCEDURE — G8417 CALC BMI ABV UP PARAM F/U: HCPCS | Performed by: ORTHOPAEDIC SURGERY

## 2024-02-05 RX ORDER — DIAZEPAM 5 MG/1
5 TABLET ORAL PRN
Qty: 1 TABLET | Refills: 0 | Status: SHIPPED | OUTPATIENT
Start: 2024-02-05 | End: 2024-02-06

## 2024-02-05 NOTE — PROGRESS NOTES
Chief Complaint   Patient presents with    Knee Pain     Right knee f/u still sore because she's trying to do more walking. Pain is on the medial side       Subjective:     Patient ID: Mar Blanca is a 54 y.o..  female    Knee Pain  Patient complains of right knee pain. This is evaluated as a personal injury. There was a history of injury.  The pain began a few months ago. The pain is located medial. She describes  Her symptoms as aching. She has experienced popping, clicking, locking, and giving way in the affected knee.  The patient has had pain with kneeling, squating, and climbing stairs.  Symptoms improve with rest. The symptoms are worse with activity, stair climbing, kneeling. The knee has given out or felt unstable. The patient can bend and straighten the knee fully.  The patient is active in none. Treatment to date has been ice, NSAID's, therapy, without significant relief. The patient is working.     Past Medical History:   Diagnosis Date    Allergic rhinitis     Anemia     Anxiety     Arthritis     rheumatoid     Carpal tunnel syndrome     Cerebral artery occlusion with cerebral infarction (HCC)      left hand feels heavy    Cerebrovascular disease 10/2021    History of blood transfusion     x5    Hx of blood clots     vaginal    Menometrorrhagia 11/17/2021    Obesity     Stroke (HCC)      Past Surgical History:   Procedure Laterality Date    CARPAL TUNNEL RELEASE Left 02/12/2021    Left Carpal Tunnel Release    CARPAL TUNNEL RELEASE Left 02/12/2021    LEFT CARPAL TUNNEL RELEASE performed by Sam Figueroa DO at [facility] OR    DILATION AND CURETTAGE OF UTERUS N/A 11/17/2021    DILATATION AND CURETTAGE HYSTEROSCOPY performed by Elías Hodges MD at [facility] OR    HYSTERECTOMY (CERVIX STATUS UNKNOWN) Bilateral 12/01/2021    ROBOTIC XI ASSISTED TOTAL LAPAROSCOPIC HYSTERECTOMY WITH BILATERAL SALPINGECTOMY performed by Elías Hodges MD at [facility] OR    HYSTERECTOMY, VAGINAL  12/2021

## 2024-02-15 ENCOUNTER — HOSPITAL ENCOUNTER (OUTPATIENT)
Dept: MRI IMAGING | Age: 54
Discharge: HOME | End: 2024-02-17
Attending: ORTHOPAEDIC SURGERY
Payer: COMMERCIAL

## 2024-02-15 DIAGNOSIS — S83.241A ACUTE MEDIAL MENISCUS TEAR, RIGHT, INITIAL ENCOUNTER: ICD-10-CM

## 2024-02-15 PROCEDURE — 73721 MRI JNT OF LWR EXTRE W/O DYE: CPT

## 2024-02-21 ENCOUNTER — OFFICE VISIT (OUTPATIENT)
Dept: PULMONOLOGY | Age: 54
End: 2024-02-21
Payer: COMMERCIAL

## 2024-02-21 VITALS
WEIGHT: 258 LBS | DIASTOLIC BLOOD PRESSURE: 87 MMHG | HEART RATE: 70 BPM | HEIGHT: 66 IN | OXYGEN SATURATION: 94 % | SYSTOLIC BLOOD PRESSURE: 153 MMHG | TEMPERATURE: 98.4 F | BODY MASS INDEX: 41.46 KG/M2

## 2024-02-21 DIAGNOSIS — G47.33 OSA (OBSTRUCTIVE SLEEP APNEA): Primary | ICD-10-CM

## 2024-02-21 DIAGNOSIS — E66.01 MORBID OBESITY (HCC): ICD-10-CM

## 2024-02-21 PROCEDURE — G8417 CALC BMI ABV UP PARAM F/U: HCPCS | Performed by: INTERNAL MEDICINE

## 2024-02-21 PROCEDURE — G8427 DOCREV CUR MEDS BY ELIG CLIN: HCPCS | Performed by: INTERNAL MEDICINE

## 2024-02-21 PROCEDURE — G8484 FLU IMMUNIZE NO ADMIN: HCPCS | Performed by: INTERNAL MEDICINE

## 2024-02-21 PROCEDURE — 3017F COLORECTAL CA SCREEN DOC REV: CPT | Performed by: INTERNAL MEDICINE

## 2024-02-21 PROCEDURE — 1036F TOBACCO NON-USER: CPT | Performed by: INTERNAL MEDICINE

## 2024-02-21 PROCEDURE — 99214 OFFICE O/P EST MOD 30 MIN: CPT | Performed by: INTERNAL MEDICINE

## 2024-02-21 NOTE — PROGRESS NOTES
Patient to follow up with physician in 3 months. Orders for CPAP will be sent over to [facility].   
History    Marital status: Single     Spouse name: None    Number of children: None    Years of education: 22    Highest education level: None   Occupational History    Occupation:      Comment: [employer]   Tobacco Use    Smoking status: Never    Smokeless tobacco: Never   Vaping Use    Vaping Use: Never used   Substance and Sexual Activity    Alcohol use: Yes     Comment: I socially drink    Drug use: No    Sexual activity: Not Currently     Partners: Male     Social Determinants of Health     Financial Resource Strain: Low Risk  (7/27/2023)    Overall Financial Resource Strain (CARDIA)     Difficulty of Paying Living Expenses: Not hard at all   Transportation Needs: Unknown (7/27/2023)    PRAPARE - Transportation     Lack of Transportation (Non-Medical): No   Housing Stability: Unknown (7/27/2023)    Housing Stability Vital Sign     Unstable Housing in the Last Year: No        Amoxicillin     Current Outpatient Medications:     clonazePAM (KLONOPIN) 0.5 MG tablet, Take 1 tablet by mouth 2 times daily as needed., Disp: , Rfl:     fluticasone (FLONASE) 50 MCG/ACT nasal spray, 2 sprays by Each Nostril route daily, Disp: 48 g, Rfl: 1    predniSONE (DELTASONE) 5 MG tablet, TAKE ONE TABLET BY MOUTH EVERY DAY IN THE MORNING, Disp: , Rfl:     acetaminophen (TYLENOL 8 HOUR ARTHRITIS PAIN) 650 MG extended release tablet, Take 1 tablet by mouth every 8 hours as needed for Pain, Disp: 60 tablet, Rfl: 3    escitalopram (LEXAPRO) 10 MG tablet, Take 1 tablet by mouth daily, Disp: 30 tablet, Rfl: 3    aspirin 81 MG EC tablet, Take 1 tablet by mouth daily, Disp: , Rfl:     folic acid (FOLVITE) 800 MCG tablet, Take 1 tablet by mouth daily, Disp: , Rfl:     Multiple Vitamins-Minerals (MULTIVITAMIN ADULT) CHEW, Take 1 tablet by mouth daily, Disp: , Rfl:     Omega-3 Fatty Acids (FISH OIL) 1000 MG CAPS, Take 1 capsule by mouth daily, Disp: , Rfl:     vitamin B-1 (THIAMINE) 100 MG tablet, Take 1 tablet by

## 2024-02-23 ENCOUNTER — OFFICE VISIT (OUTPATIENT)
Dept: PRIMARY CARE CLINIC | Age: 54
End: 2024-02-23
Payer: COMMERCIAL

## 2024-02-23 VITALS
HEART RATE: 70 BPM | TEMPERATURE: 97.9 F | WEIGHT: 256 LBS | SYSTOLIC BLOOD PRESSURE: 120 MMHG | OXYGEN SATURATION: 94 % | DIASTOLIC BLOOD PRESSURE: 80 MMHG | HEIGHT: 66 IN | BODY MASS INDEX: 41.14 KG/M2

## 2024-02-23 DIAGNOSIS — J01.91 ACUTE RECURRENT SINUSITIS, UNSPECIFIED LOCATION: Primary | ICD-10-CM

## 2024-02-23 DIAGNOSIS — J30.1 NON-SEASONAL ALLERGIC RHINITIS DUE TO POLLEN: ICD-10-CM

## 2024-02-23 PROCEDURE — 3017F COLORECTAL CA SCREEN DOC REV: CPT | Performed by: INTERNAL MEDICINE

## 2024-02-23 PROCEDURE — 96372 THER/PROPH/DIAG INJ SC/IM: CPT | Performed by: INTERNAL MEDICINE

## 2024-02-23 PROCEDURE — 1036F TOBACCO NON-USER: CPT | Performed by: INTERNAL MEDICINE

## 2024-02-23 PROCEDURE — 99213 OFFICE O/P EST LOW 20 MIN: CPT | Performed by: INTERNAL MEDICINE

## 2024-02-23 PROCEDURE — G8417 CALC BMI ABV UP PARAM F/U: HCPCS | Performed by: INTERNAL MEDICINE

## 2024-02-23 PROCEDURE — G8427 DOCREV CUR MEDS BY ELIG CLIN: HCPCS | Performed by: INTERNAL MEDICINE

## 2024-02-23 PROCEDURE — G8484 FLU IMMUNIZE NO ADMIN: HCPCS | Performed by: INTERNAL MEDICINE

## 2024-02-23 RX ORDER — BENZONATATE 200 MG/1
200 CAPSULE ORAL 3 TIMES DAILY PRN
Qty: 30 CAPSULE | Refills: 0 | Status: SHIPPED | OUTPATIENT
Start: 2024-02-23 | End: 2024-03-04

## 2024-02-23 RX ORDER — AZITHROMYCIN 250 MG/1
TABLET, FILM COATED ORAL
Qty: 6 TABLET | Refills: 0 | Status: SHIPPED | OUTPATIENT
Start: 2024-02-23 | End: 2024-03-04

## 2024-02-23 RX ORDER — METHYLPREDNISOLONE ACETATE 80 MG/ML
80 INJECTION, SUSPENSION INTRA-ARTICULAR; INTRALESIONAL; INTRAMUSCULAR; SOFT TISSUE ONCE
Status: COMPLETED | OUTPATIENT
Start: 2024-02-23 | End: 2024-02-23

## 2024-02-23 RX ADMIN — METHYLPREDNISOLONE ACETATE 80 MG: 80 INJECTION, SUSPENSION INTRA-ARTICULAR; INTRALESIONAL; INTRAMUSCULAR; SOFT TISSUE at 13:21

## 2024-02-23 ASSESSMENT — ENCOUNTER SYMPTOMS
EYE REDNESS: 0
GASTROINTESTINAL NEGATIVE: 1
EYES NEGATIVE: 1
FACIAL SWELLING: 0
ALLERGIC/IMMUNOLOGIC NEGATIVE: 1
EYE DISCHARGE: 0
COUGH: 1
RHINORRHEA: 0
EYE ITCHING: 0
EYE PAIN: 0
SINUS PAIN: 0

## 2024-02-23 NOTE — PROGRESS NOTES
0.7 01/20/2024 11:03 AM    GFRAA >60 09/23/2022 08:30 AM    LABGLOM >60 01/20/2024 11:03 AM    GLUCOSE 108 01/20/2024 11:03 AM    PROT 7.4 01/20/2024 11:03 AM    LABALBU 4.3 01/20/2024 11:03 AM    CALCIUM 9.0 01/20/2024 11:03 AM    BILITOT 0.6 01/20/2024 11:03 AM    ALKPHOS 66 01/20/2024 11:03 AM    AST 20 01/20/2024 11:03 AM    ALT 30 01/20/2024 11:03 AM            Objective   /80   Pulse 70   Temp 97.9 °F (36.6 °C) (Temporal)   Ht 1.676 m (5' 6\")   Wt 116.1 kg (256 lb)   LMP 09/30/2021   SpO2 94%   BMI 41.32 kg/m²   Current Outpatient Medications   Medication Sig Dispense Refill    azithromycin (ZITHROMAX) 250 MG tablet 500mg on day 1 followed by 250mg on days 2 - 5 6 tablet 0    benzonatate (TESSALON) 200 MG capsule Take 1 capsule by mouth 3 times daily as needed for Cough 30 capsule 0    clonazePAM (KLONOPIN) 0.5 MG tablet Take 1 tablet by mouth 2 times daily as needed.      fluticasone (FLONASE) 50 MCG/ACT nasal spray 2 sprays by Each Nostril route daily 48 g 1    predniSONE (DELTASONE) 5 MG tablet TAKE ONE TABLET BY MOUTH EVERY DAY IN THE MORNING      acetaminophen (TYLENOL 8 HOUR ARTHRITIS PAIN) 650 MG extended release tablet Take 1 tablet by mouth every 8 hours as needed for Pain 60 tablet 3    escitalopram (LEXAPRO) 10 MG tablet Take 1 tablet by mouth daily 30 tablet 3    aspirin 81 MG EC tablet Take 1 tablet by mouth daily      folic acid (FOLVITE) 800 MCG tablet Take 1 tablet by mouth daily      Multiple Vitamins-Minerals (MULTIVITAMIN ADULT) CHEW Take 1 tablet by mouth daily      Omega-3 Fatty Acids (FISH OIL) 1000 MG CAPS Take 1 capsule by mouth daily      Vitamin D (CHOLECALCIFEROL) 25 MCG (1000 UT) TABS tablet Take 1 tablet by mouth daily      methotrexate (RHEUMATREX) 2.5 MG chemo tablet Take 8 tablets by mouth once a week Friday (8 Tablets)      vitamin B-1 (THIAMINE) 100 MG tablet Take 1 tablet by mouth daily (Patient not taking: Reported on 2/23/2024)       No current

## 2024-03-12 ENCOUNTER — OFFICE VISIT (OUTPATIENT)
Dept: ORTHOPEDIC SURGERY | Age: 54
End: 2024-03-12
Payer: COMMERCIAL

## 2024-03-12 VITALS — TEMPERATURE: 98 F | HEIGHT: 66 IN | BODY MASS INDEX: 41.14 KG/M2 | WEIGHT: 256 LBS

## 2024-03-12 DIAGNOSIS — M17.11 PRIMARY OSTEOARTHRITIS OF RIGHT KNEE: Primary | ICD-10-CM

## 2024-03-12 PROCEDURE — G8427 DOCREV CUR MEDS BY ELIG CLIN: HCPCS | Performed by: ORTHOPAEDIC SURGERY

## 2024-03-12 PROCEDURE — 99213 OFFICE O/P EST LOW 20 MIN: CPT | Performed by: ORTHOPAEDIC SURGERY

## 2024-03-12 PROCEDURE — 3017F COLORECTAL CA SCREEN DOC REV: CPT | Performed by: ORTHOPAEDIC SURGERY

## 2024-03-12 PROCEDURE — 1036F TOBACCO NON-USER: CPT | Performed by: ORTHOPAEDIC SURGERY

## 2024-03-12 PROCEDURE — G8484 FLU IMMUNIZE NO ADMIN: HCPCS | Performed by: ORTHOPAEDIC SURGERY

## 2024-03-12 PROCEDURE — G8417 CALC BMI ABV UP PARAM F/U: HCPCS | Performed by: ORTHOPAEDIC SURGERY

## 2024-03-12 PROCEDURE — 20610 DRAIN/INJ JOINT/BURSA W/O US: CPT | Performed by: ORTHOPAEDIC SURGERY

## 2024-03-12 RX ORDER — TRIAMCINOLONE ACETONIDE 40 MG/ML
40 INJECTION, SUSPENSION INTRA-ARTICULAR; INTRAMUSCULAR ONCE
Status: COMPLETED | OUTPATIENT
Start: 2024-03-12 | End: 2024-03-12

## 2024-03-12 RX ADMIN — TRIAMCINOLONE ACETONIDE 40 MG: 40 INJECTION, SUSPENSION INTRA-ARTICULAR; INTRAMUSCULAR at 16:48

## 2024-03-12 NOTE — PROGRESS NOTES
PRAPARE - Transportation     Lack of Transportation (Medical): Not on file     Lack of Transportation (Non-Medical): No   Physical Activity: Not on file   Stress: Not on file   Social Connections: Not on file   Intimate Partner Violence: Not on file   Housing Stability: Unknown (7/27/2023)    Housing Stability Vital Sign     Unable to Pay for Housing in the Last Year: Not on file     Number of Places Lived in the Last Year: Not on file     Unstable Housing in the Last Year: No     Family History   Problem Relation Age of Onset    Other Mother     High Blood Pressure Mother     Obesity Mother     Vision Loss Mother     Arthritis Father     Diabetes Father     Other Father     Hearing Loss Father     High Blood Pressure Father     Osteoarthritis Father     Breast Cancer Paternal Aunt     Asthma Brother     High Blood Pressure Brother          REVIEW OF SYSTEMS:     General/Constitution:  (-)weight loss, (-)fever, (-)chills, (-)weakness.   Skin: (-) rash,(-) psoriasis,(-) eczema, (-)skin cancer.   Musculoskeletal: (-) fractures,  (-) dislocations,(-) collagen vascular disease, (-) fibromyalgia, (-) multiple sclerosis, (-) muscular dystrophy, (-) RSD,(-) joint pain (-)swelling, (-) joint pain,swelling.  Neurologic: (-) epilepsy, (-)seizures,(-) brain tumor,(-) TIA, (-)stroke, (-)headaches, (-)Parkinson disease,(-) memory loss, (-) LOC.  Cardiovascular: (-) Chest pain, (-) swelling in legs/feet, (-) SOB, (-) cramping in legs/feet with walking.  Respiratory: (-) SOB, (-) Coughing, (-) night sweats.  GI: (-) nausea, (-) vomiting, (-) diarrhea, (-) blood in stool, (-) gastric ulcer.  Psychiatric: (-) Depression, (-) Anxiety, (-) bipolar disease, (-) Alzheimer's Disease  Allergic/Immunologic: (-) allergies latex, (-) allergies metal, (-) skin sensitivity.  Hematlogic: (-) anemia, (-) blood transfusion, (-) DVT/PE, (-) Clotting disorders    Subjective:    Constitution:  Temp 98 °F (36.7 °C)   Ht 1.676 m (5' 5.98\")   Wt

## 2024-03-15 ENCOUNTER — TELEPHONE (OUTPATIENT)
Dept: ORTHOPEDIC SURGERY | Age: 54
End: 2024-03-15

## 2024-03-21 ENCOUNTER — NURSE ONLY (OUTPATIENT)
Dept: ORTHOPEDIC SURGERY | Age: 54
End: 2024-03-21

## 2024-03-21 DIAGNOSIS — M17.11 PRIMARY OSTEOARTHRITIS OF RIGHT KNEE: Primary | ICD-10-CM

## 2024-03-21 RX ORDER — HYALURONATE SODIUM 10 MG/ML
20 SYRINGE (ML) INTRAARTICULAR ONCE
Status: COMPLETED | OUTPATIENT
Start: 2024-03-21 | End: 2024-03-21

## 2024-03-21 RX ADMIN — Medication 20 MG: at 15:54

## 2024-03-21 NOTE — PROGRESS NOTES
Chief Complaint   Patient presents with    Knee Pain     Right knee Euflexxa #1       Verbal and written consent was obtained by the patient.  The following is a well known to me that is here for Euflexxa # 1. The knee was prepped in sterile fashion. Euflexxa 20 mg injected to Right knee. The patient tolerated the injections well and I will see the patient back in 1 week for repeat injections.      Mar was seen today for knee pain.    Diagnoses and all orders for this visit:    Primary osteoarthritis of right knee

## 2024-03-26 ENCOUNTER — TELEPHONE (OUTPATIENT)
Dept: PRIMARY CARE CLINIC | Age: 54
End: 2024-03-26

## 2024-03-26 NOTE — TELEPHONE ENCOUNTER
Pt notified that she needs to follow up with Dr Woody for this, when I called her back she had already remembered and called their office to make an appointment she got their voicemail and is waiting for a call back

## 2024-03-26 NOTE — TELEPHONE ENCOUNTER
----- Message from Maylin Pedersen sent at 3/26/2024  3:59 PM EDT -----  Subject: Message to Provider    QUESTIONS  Information for Provider? pt. said that she is supposed to be getting a   CPAP machine and wasn't sure what doctor she was referred to for this. She   doesn't remember if her PCP referred her or another doctor. I am seeing no   referral. Please advise.   ---------------------------------------------------------------------------  --------------  CALL BACK INFO  [phone]; OK to leave message on voicemail  ---------------------------------------------------------------------------  --------------  SCRIPT ANSWERS  Relationship to Patient? Self

## 2024-03-29 ENCOUNTER — NURSE ONLY (OUTPATIENT)
Dept: ORTHOPEDIC SURGERY | Age: 54
End: 2024-03-29

## 2024-03-29 DIAGNOSIS — M17.11 PRIMARY OSTEOARTHRITIS OF RIGHT KNEE: Primary | ICD-10-CM

## 2024-03-29 RX ORDER — HYALURONATE SODIUM 10 MG/ML
20 SYRINGE (ML) INTRAARTICULAR ONCE
Status: COMPLETED | OUTPATIENT
Start: 2024-03-29 | End: 2024-03-29

## 2024-03-29 RX ADMIN — Medication 20 MG: at 09:31

## 2024-03-29 NOTE — PROGRESS NOTES
Chief Complaint   Patient presents with    Knee Pain     Right knee Euflexxa #2       Verbal and written consent was obtained by the patient.  The following is a well known to me that is here for Euflexxa # 2. The knee was prepped in sterile fashion. Euflexxa 20 mg injected to Right knee. The patient tolerated the injections well and I will see the patient back in 1 week for repeat injections.      Mar was seen today for knee pain.    Diagnoses and all orders for this visit:    Primary osteoarthritis of right knee

## 2024-04-05 ENCOUNTER — NURSE ONLY (OUTPATIENT)
Dept: ORTHOPEDIC SURGERY | Age: 54
End: 2024-04-05
Payer: COMMERCIAL

## 2024-04-05 DIAGNOSIS — M17.11 PRIMARY OSTEOARTHRITIS OF RIGHT KNEE: Primary | ICD-10-CM

## 2024-04-05 PROCEDURE — 20610 DRAIN/INJ JOINT/BURSA W/O US: CPT

## 2024-04-05 RX ORDER — HYALURONATE SODIUM 10 MG/ML
20 SYRINGE (ML) INTRAARTICULAR ONCE
Status: COMPLETED | OUTPATIENT
Start: 2024-04-05 | End: 2024-04-05

## 2024-04-05 RX ADMIN — Medication 20 MG: at 10:18

## 2024-04-05 NOTE — PROGRESS NOTES
Chief Complaint   Patient presents with    Knee Pain     Right knee Euflexxa #3       Verbal and written consent was obtained by the patient.  The following is a well known to me that is here for Euflexxa #3. The knee was prepped in sterile fashion. Euflexxa 20 mg injected to Right knee . The patient tolerated the injections well and I will see the patient back in 1 month.    Mar was seen today for knee pain.    Diagnoses and all orders for this visit:    Primary osteoarthritis of right knee

## 2024-04-07 DIAGNOSIS — S20.212A RIB CONTUSION, LEFT, INITIAL ENCOUNTER: ICD-10-CM

## 2024-04-07 DIAGNOSIS — M17.11 PRIMARY OSTEOARTHRITIS OF RIGHT KNEE: ICD-10-CM

## 2024-04-07 DIAGNOSIS — S39.012A BACK STRAIN, INITIAL ENCOUNTER: ICD-10-CM

## 2024-04-08 RX ORDER — GABAPENTIN 300 MG/1
300 CAPSULE ORAL NIGHTLY
Qty: 30 CAPSULE | Refills: 0 | OUTPATIENT
Start: 2024-04-08

## 2024-04-13 ENCOUNTER — HOSPITAL ENCOUNTER (OUTPATIENT)
Age: 54
Discharge: HOME | End: 2024-04-13
Payer: COMMERCIAL

## 2024-04-13 LAB
ALBUMIN SERPL-MCNC: 4.2 G/DL (ref 3.5–5.2)
ALP SERPL-CCNC: 71 U/L (ref 35–104)
ALT SERPL-CCNC: 29 U/L (ref 0–32)
ANION GAP SERPL CALCULATED.3IONS-SCNC: 10 MMOL/L (ref 7–16)
AST SERPL-CCNC: 27 U/L (ref 0–31)
BILIRUB SERPL-MCNC: 0.6 MG/DL (ref 0–1.2)
BUN SERPL-MCNC: 11 MG/DL (ref 6–20)
CALCIUM SERPL-MCNC: 8.5 MG/DL (ref 8.6–10.2)
CHLORIDE SERPL-SCNC: 101 MMOL/L (ref 98–107)
CO2 SERPL-SCNC: 27 MMOL/L (ref 22–29)
CREAT SERPL-MCNC: 0.6 MG/DL (ref 0.5–1)
ERYTHROCYTE [DISTWIDTH] IN BLOOD BY AUTOMATED COUNT: 15.1 % (ref 11.5–15)
ERYTHROCYTE [SEDIMENTATION RATE] IN BLOOD BY WESTERGREN METHOD: 29 MM/HR (ref 0–20)
GFR SERPL CREATININE-BSD FRML MDRD: >90 ML/MIN/1.73M2
GLUCOSE SERPL-MCNC: 104 MG/DL (ref 74–99)
HCT VFR BLD AUTO: 40.3 % (ref 34–48)
HGB BLD-MCNC: 13.3 G/DL (ref 11.5–15.5)
MCH RBC QN AUTO: 30.2 PG (ref 26–35)
MCHC RBC AUTO-ENTMCNC: 33 G/DL (ref 32–34.5)
MCV RBC AUTO: 91.6 FL (ref 80–99.9)
PLATELET # BLD AUTO: 352 K/UL (ref 130–450)
PMV BLD AUTO: 9.4 FL (ref 7–12)
POTASSIUM SERPL-SCNC: 4.3 MMOL/L (ref 3.5–5)
PROT SERPL-MCNC: 7.7 G/DL (ref 6.4–8.3)
RBC # BLD AUTO: 4.4 M/UL (ref 3.5–5.5)
SODIUM SERPL-SCNC: 138 MMOL/L (ref 132–146)
WBC OTHER # BLD: 10.2 K/UL (ref 4.5–11.5)

## 2024-04-13 PROCEDURE — 85027 COMPLETE CBC AUTOMATED: CPT

## 2024-04-13 PROCEDURE — 85652 RBC SED RATE AUTOMATED: CPT

## 2024-04-13 PROCEDURE — 80053 COMPREHEN METABOLIC PANEL: CPT

## 2024-04-13 PROCEDURE — 36415 COLL VENOUS BLD VENIPUNCTURE: CPT

## 2024-04-22 ENCOUNTER — OFFICE VISIT (OUTPATIENT)
Dept: ORTHOPEDIC SURGERY | Age: 54
End: 2024-04-22

## 2024-04-22 VITALS — TEMPERATURE: 98 F | WEIGHT: 256 LBS | BODY MASS INDEX: 41.14 KG/M2 | HEIGHT: 66 IN

## 2024-04-22 DIAGNOSIS — G56.01 RIGHT CARPAL TUNNEL SYNDROME: Primary | ICD-10-CM

## 2024-04-22 RX ORDER — TRIAMCINOLONE ACETONIDE 40 MG/ML
40 INJECTION, SUSPENSION INTRA-ARTICULAR; INTRAMUSCULAR ONCE
Status: COMPLETED | OUTPATIENT
Start: 2024-04-22 | End: 2024-04-22

## 2024-04-22 RX ADMIN — TRIAMCINOLONE ACETONIDE 40 MG: 40 INJECTION, SUSPENSION INTRA-ARTICULAR; INTRAMUSCULAR at 10:16

## 2024-04-22 NOTE — PROGRESS NOTES
and for the Left shoulder is 160/45/T8.Right shoulder Motor strength is 5/5 in the supraspinatus, 5/5 internal rotation and 5/5 in external rotation, and Left shoulder motor strength 5/5 in supraspinatus, 5/5 in internal rotation, 5/5 in external rotation.      Elbow exam:  Evaluation of the elbow, reveals no signs of swelling or deformity. ROM is 0-150.  There is not instability with varus/valgus stresses.  Motor strength is 5/5 with flexion/extension.     Wrist exam:  Inspection of the bilateral upper extremities, there is no evidence of deformity of the wrist.  ROM Wrist ROM R wrist DF 80, VF 70, L wrist DF 90, VF 90, R pronation 90/ supination 90, L pronation 90/supination 90.  Motor strength is 5/5 with Dorsiflexion/Volarflexion/Supination/Pronation.  Motor and sensation is intact and symmetric throughout the bilateral upper extremities in the median, ulnar and radial , musclcutaneous, and axillary nerve distributions. Tenderness over ulnar side of wrist    Hand exam:  The skin overlying the hand is  intact.  There is not evidence of scar, lesion, laceration, or abrasion.  The motion in the small joints of the hand are intact with no stiffness or deformity.  The ROM in the MCP flexion WNL/ extension WNL , PIP flexion WNL/ extension WNL, DIP flexion WNL/ extension WNL.  There is not rotational deformity.    There is no masses or adenopathy in bilateral upper extremities.  Radial pulses are 2+ and symmetric bilaterally.  Capillary refill is intact and < 2 seconds.  Motor strength is 5/5 with flexion and extension of the small finger joints.     Right:  Phallens sign(+), Tinnells sign (+), Median nerve compression test (+),  Finklesteins (-), CMC Grind test (-), Piano Key Test(-).   Left:    Phallens sign(-), Tinnells sign (-), Median nerve compression test (-),  Finklesteins (-), CMC Grind test (-), Piano Key Test(-).     Xrays:   None    EMG: Moderate carpal tunnel    Radiographic findings reviewed with

## 2024-05-03 ENCOUNTER — OFFICE VISIT (OUTPATIENT)
Dept: FAMILY MEDICINE CLINIC | Age: 54
End: 2024-05-03
Payer: COMMERCIAL

## 2024-05-03 VITALS
SYSTOLIC BLOOD PRESSURE: 137 MMHG | RESPIRATION RATE: 18 BRPM | OXYGEN SATURATION: 98 % | WEIGHT: 256 LBS | HEIGHT: 66 IN | HEART RATE: 78 BPM | DIASTOLIC BLOOD PRESSURE: 80 MMHG | TEMPERATURE: 97.5 F | BODY MASS INDEX: 41.14 KG/M2

## 2024-05-03 DIAGNOSIS — J01.40 ACUTE NON-RECURRENT PANSINUSITIS: Primary | ICD-10-CM

## 2024-05-03 PROCEDURE — G8417 CALC BMI ABV UP PARAM F/U: HCPCS

## 2024-05-03 PROCEDURE — 3017F COLORECTAL CA SCREEN DOC REV: CPT

## 2024-05-03 PROCEDURE — 1036F TOBACCO NON-USER: CPT

## 2024-05-03 PROCEDURE — 99213 OFFICE O/P EST LOW 20 MIN: CPT

## 2024-05-03 PROCEDURE — G8427 DOCREV CUR MEDS BY ELIG CLIN: HCPCS

## 2024-05-03 PROCEDURE — 96372 THER/PROPH/DIAG INJ SC/IM: CPT

## 2024-05-03 RX ORDER — DEXAMETHASONE SODIUM PHOSPHATE 10 MG/ML
10 INJECTION INTRAMUSCULAR; INTRAVENOUS ONCE
Status: COMPLETED | OUTPATIENT
Start: 2024-05-03 | End: 2024-05-03

## 2024-05-03 RX ORDER — AZITHROMYCIN 250 MG/1
TABLET, FILM COATED ORAL
Qty: 6 TABLET | Refills: 0 | Status: SHIPPED | OUTPATIENT
Start: 2024-05-03

## 2024-05-03 RX ORDER — BROMPHENIRAMINE MALEATE, PSEUDOEPHEDRINE HYDROCHLORIDE, AND DEXTROMETHORPHAN HYDROBROMIDE 2; 30; 10 MG/5ML; MG/5ML; MG/5ML
SYRUP ORAL
Qty: 180 ML | Refills: 0 | Status: SHIPPED | OUTPATIENT
Start: 2024-05-03

## 2024-05-03 RX ADMIN — DEXAMETHASONE SODIUM PHOSPHATE 10 MG: 10 INJECTION INTRAMUSCULAR; INTRAVENOUS at 14:29

## 2024-05-03 NOTE — PROGRESS NOTES
5/3/24  Mar Blanca : 1970 Sex: female  Age 54 y.o.    Subjective:  Chief Complaint   Patient presents with    Cough     Chest congestion, sinus drainage and pressure, ear pressure, started yesterday       HPI:   Mar Blanca , 54 y.o. female presents to the clinic for evaluation of cough x 2 days. The patient also reports chest congestion. The patient has taken flonase and april pot for symptoms. The patient reports worsening symptoms over time. The patient denies ill exposure. The patient has hx of COVID-19. The patient denies acute loss of taste and smell, headache, sinus congestion, sore throat, rash, and fever. The patient also denies chest pain, abdominal pain, shortness of breath, wheezing, and nausea / vomiting / diarrhea.    ROS:   Unless otherwise stated in this report the patient's positive and negative responses for review of systems for constitutional, eyes, ENT, cardiovascular, respiratory, gastrointestinal, neurological, , musculoskeletal, and integument systems and related systems to the presenting problem are either stated in the history of present illness or were not pertinent or were negative for the symptoms and/or complaints related to the presenting medical problem.  Positives and pertinent negatives as per HPI.  All others reviewed and are negative.      PMH:     Past Medical History:   Diagnosis Date    Allergic rhinitis     Anemia     Anxiety     Arthritis     rheumatoid     Carpal tunnel syndrome     Cerebral artery occlusion with cerebral infarction (HCC)      left hand feels heavy    Cerebrovascular disease 10/2021    History of blood transfusion     x5    Hx of blood clots     vaginal    Menometrorrhagia 2021    Obesity     Stroke (HCC)        Past Surgical History:   Procedure Laterality Date    CARPAL TUNNEL RELEASE Left 2021    Left Carpal Tunnel Release    CARPAL TUNNEL RELEASE Left 2021    LEFT CARPAL TUNNEL RELEASE performed by Sma

## 2024-05-06 ENCOUNTER — OFFICE VISIT (OUTPATIENT)
Dept: ORTHOPEDIC SURGERY | Age: 54
End: 2024-05-06
Payer: COMMERCIAL

## 2024-05-06 VITALS — TEMPERATURE: 98 F | HEIGHT: 66 IN | WEIGHT: 256 LBS | BODY MASS INDEX: 41.14 KG/M2

## 2024-05-06 DIAGNOSIS — S83.241A ACUTE MEDIAL MENISCUS TEAR, RIGHT, INITIAL ENCOUNTER: ICD-10-CM

## 2024-05-06 DIAGNOSIS — M17.11 PRIMARY OSTEOARTHRITIS OF RIGHT KNEE: Primary | ICD-10-CM

## 2024-05-06 PROCEDURE — 1036F TOBACCO NON-USER: CPT | Performed by: ORTHOPAEDIC SURGERY

## 2024-05-06 PROCEDURE — 3017F COLORECTAL CA SCREEN DOC REV: CPT | Performed by: ORTHOPAEDIC SURGERY

## 2024-05-06 PROCEDURE — G8417 CALC BMI ABV UP PARAM F/U: HCPCS | Performed by: ORTHOPAEDIC SURGERY

## 2024-05-06 PROCEDURE — G8427 DOCREV CUR MEDS BY ELIG CLIN: HCPCS | Performed by: ORTHOPAEDIC SURGERY

## 2024-05-06 PROCEDURE — 99213 OFFICE O/P EST LOW 20 MIN: CPT | Performed by: ORTHOPAEDIC SURGERY

## 2024-05-06 NOTE — PROGRESS NOTES
conservative measures such as NSAIDS, HEP, and cortisone injections.  She is an excellent candidate for Zilretta injections  in the Right knee. We will contact the patient's insurance company and see them back in the office once we have received approval.

## 2024-05-15 ENCOUNTER — TELEPHONE (OUTPATIENT)
Dept: ORTHOPEDIC SURGERY | Age: 54
End: 2024-05-15

## 2024-05-30 ENCOUNTER — OFFICE VISIT (OUTPATIENT)
Dept: PULMONOLOGY | Age: 54
End: 2024-05-30
Payer: COMMERCIAL

## 2024-05-30 VITALS
BODY MASS INDEX: 43.13 KG/M2 | HEIGHT: 66 IN | WEIGHT: 268.4 LBS | HEART RATE: 65 BPM | RESPIRATION RATE: 17 BRPM | OXYGEN SATURATION: 97 % | SYSTOLIC BLOOD PRESSURE: 148 MMHG | DIASTOLIC BLOOD PRESSURE: 88 MMHG | TEMPERATURE: 98.2 F

## 2024-05-30 DIAGNOSIS — G47.33 OSA (OBSTRUCTIVE SLEEP APNEA): Primary | ICD-10-CM

## 2024-05-30 DIAGNOSIS — E66.01 MORBID OBESITY (HCC): ICD-10-CM

## 2024-05-30 PROCEDURE — 99214 OFFICE O/P EST MOD 30 MIN: CPT | Performed by: INTERNAL MEDICINE

## 2024-05-30 PROCEDURE — 3017F COLORECTAL CA SCREEN DOC REV: CPT | Performed by: INTERNAL MEDICINE

## 2024-05-30 PROCEDURE — G8417 CALC BMI ABV UP PARAM F/U: HCPCS | Performed by: INTERNAL MEDICINE

## 2024-05-30 PROCEDURE — 1036F TOBACCO NON-USER: CPT | Performed by: INTERNAL MEDICINE

## 2024-05-30 PROCEDURE — G8427 DOCREV CUR MEDS BY ELIG CLIN: HCPCS | Performed by: INTERNAL MEDICINE

## 2024-05-30 NOTE — PROGRESS NOTES
Cancer Paternal Aunt     Asthma Brother     High Blood Pressure Brother       Social History     Socioeconomic History    Marital status: Single     Spouse name: None    Number of children: None    Years of education: 22    Highest education level: None   Occupational History    Occupation:      Comment: [employer]   Tobacco Use    Smoking status: Never    Smokeless tobacco: Never   Vaping Use    Vaping Use: Never used   Substance and Sexual Activity    Alcohol use: Yes     Comment: I socially drink    Drug use: No    Sexual activity: Not Currently     Partners: Male     Social Determinants of Health     Financial Resource Strain: Low Risk  (7/27/2023)    Overall Financial Resource Strain (CARDIA)     Difficulty of Paying Living Expenses: Not hard at all   Transportation Needs: Unknown (7/27/2023)    PRAPARE - Transportation     Lack of Transportation (Non-Medical): No   Housing Stability: Unknown (7/27/2023)    Housing Stability Vital Sign     Unstable Housing in the Last Year: No        Amoxicillin     Current Outpatient Medications:     azithromycin (ZITHROMAX Z-MARYJO) 250 MG tablet, Take 2 tabs on day one, then 1 tab daily for the next 4 days, Disp: 6 tablet, Rfl: 0    brompheniramine-pseudoephedrine-DM 2-30-10 MG/5ML syrup, 5 - 10 mL by mouth every 6 hours as needed for cough / congestion., Disp: 180 mL, Rfl: 0    clonazePAM (KLONOPIN) 0.5 MG tablet, Take 1 tablet by mouth 2 times daily as needed., Disp: , Rfl:     fluticasone (FLONASE) 50 MCG/ACT nasal spray, 2 sprays by Each Nostril route daily, Disp: 48 g, Rfl: 1    predniSONE (DELTASONE) 5 MG tablet, TAKE ONE TABLET BY MOUTH EVERY DAY IN THE MORNING, Disp: , Rfl:     acetaminophen (TYLENOL 8 HOUR ARTHRITIS PAIN) 650 MG extended release tablet, Take 1 tablet by mouth every 8 hours as needed for Pain, Disp: 60 tablet, Rfl: 3    escitalopram (LEXAPRO) 10 MG tablet, Take 1 tablet by mouth daily, Disp: 30 tablet, Rfl: 3    aspirin 81

## 2024-06-06 ENCOUNTER — NURSE ONLY (OUTPATIENT)
Dept: PULMONOLOGY | Age: 54
End: 2024-06-06

## 2024-06-06 DIAGNOSIS — G47.33 OSA (OBSTRUCTIVE SLEEP APNEA): Primary | ICD-10-CM

## 2024-06-18 ENCOUNTER — OFFICE VISIT (OUTPATIENT)
Dept: ORTHOPEDIC SURGERY | Age: 54
End: 2024-06-18
Payer: COMMERCIAL

## 2024-06-18 DIAGNOSIS — M17.11 PRIMARY OSTEOARTHRITIS OF RIGHT KNEE: Primary | ICD-10-CM

## 2024-06-18 PROCEDURE — 20610 DRAIN/INJ JOINT/BURSA W/O US: CPT | Performed by: ORTHOPAEDIC SURGERY

## 2024-09-05 ENCOUNTER — OFFICE VISIT (OUTPATIENT)
Dept: FAMILY MEDICINE CLINIC | Age: 54
End: 2024-09-05
Payer: COMMERCIAL

## 2024-09-05 VITALS
OXYGEN SATURATION: 99 % | TEMPERATURE: 97.4 F | BODY MASS INDEX: 43.07 KG/M2 | HEART RATE: 80 BPM | SYSTOLIC BLOOD PRESSURE: 139 MMHG | RESPIRATION RATE: 19 BRPM | WEIGHT: 268 LBS | HEIGHT: 66 IN | DIASTOLIC BLOOD PRESSURE: 78 MMHG

## 2024-09-05 DIAGNOSIS — H10.13 ALLERGIC CONJUNCTIVITIS AND RHINITIS, BILATERAL: Primary | ICD-10-CM

## 2024-09-05 DIAGNOSIS — J30.9 ALLERGIC CONJUNCTIVITIS AND RHINITIS, BILATERAL: Primary | ICD-10-CM

## 2024-09-05 PROCEDURE — G8417 CALC BMI ABV UP PARAM F/U: HCPCS

## 2024-09-05 PROCEDURE — G8427 DOCREV CUR MEDS BY ELIG CLIN: HCPCS

## 2024-09-05 PROCEDURE — 99213 OFFICE O/P EST LOW 20 MIN: CPT

## 2024-09-05 PROCEDURE — 1036F TOBACCO NON-USER: CPT

## 2024-09-05 PROCEDURE — 3017F COLORECTAL CA SCREEN DOC REV: CPT

## 2024-09-05 PROCEDURE — 96372 THER/PROPH/DIAG INJ SC/IM: CPT

## 2024-09-05 RX ORDER — DEXAMETHASONE SODIUM PHOSPHATE 10 MG/ML
10 INJECTION INTRAMUSCULAR; INTRAVENOUS ONCE
Status: COMPLETED | OUTPATIENT
Start: 2024-09-05 | End: 2024-09-05

## 2024-09-05 RX ORDER — OLOPATADINE HYDROCHLORIDE 1 MG/ML
1 SOLUTION/ DROPS OPHTHALMIC 2 TIMES DAILY PRN
Qty: 5 ML | Refills: 0 | Status: SHIPPED | OUTPATIENT
Start: 2024-09-05

## 2024-09-05 RX ORDER — METHYLPREDNISOLONE ACETATE 80 MG/ML
80 INJECTION, SUSPENSION INTRA-ARTICULAR; INTRALESIONAL; INTRAMUSCULAR; SOFT TISSUE ONCE
Status: DISCONTINUED | OUTPATIENT
Start: 2024-09-05 | End: 2024-09-05

## 2024-09-05 RX ADMIN — DEXAMETHASONE SODIUM PHOSPHATE 10 MG: 10 INJECTION INTRAMUSCULAR; INTRAVENOUS at 09:02

## 2024-09-05 NOTE — PROGRESS NOTES
transesophageal echocardiogram (N/A, 01/17/2022); Partial hysterectomy; and Hysterectomy, vaginal (12/2021).  Social History:  reports that she has never smoked. She has never used smokeless tobacco. She reports current alcohol use. She reports that she does not use drugs.  Family History: family history includes Arthritis in her father; Asthma in her brother; Breast Cancer in her paternal aunt; Diabetes in her father; Hearing Loss in her father; High Blood Pressure in her brother, father, and mother; Obesity in her mother; Osteoarthritis in her father; Other in her father and mother; Vision Loss in her mother.  Allergies: Amoxicillin    Physical Exam   Vital Signs:  /78   Pulse 80   Temp 97.4 °F (36.3 °C) (Temporal)   Resp 19   Ht 1.676 m (5' 5.98\")   Wt 121.6 kg (268 lb)   LMP 09/30/2021   SpO2 99%   BMI 43.28 kg/m²    Oxygen Saturation Interpretation: Normal.    Constitutional:  Alert, development consistent with age.  HENT:  NC/NT.  Airway patent.  Eyes:         Pupils: PERRL bilaterally.       Eyelids:  Mild edema to the left upper and lower lids.         Conjunctiva: Moderate erythema noted to the left conjunctiva.       Sclera: Clear bilaterally. No obvious FB, rust ring, or hyphema.           Cornea: No obvious corneal abrasion or ulceration bilaterally.         EOM:  Intact bilaterally.      Visual Acuity:  Grossly intact.  Lungs: CTAB without wheezing, rales, or rhonchi  Heart: RRR, no murmurs, rubs, or gallops.  Skin: Moist and warm without rashes or lesions.  Lymphatics: No lymphangitis or adenopathy noted.  Neurological:  Alert and oriented.  Motor functions intact.    Test Results Section   (All laboratory and radiology results have been personally reviewed by myself)  Labs:  No results found for this visit on 09/05/24.    Imaging:  All Radiology results interpreted by Radiologist unless otherwise noted.  No results found.    Assessment / Plan   Impression(s):  Mar was seen today

## 2024-09-23 DIAGNOSIS — M79.641 RIGHT HAND PAIN: Primary | ICD-10-CM

## 2024-09-24 ENCOUNTER — OFFICE VISIT (OUTPATIENT)
Dept: ORTHOPEDIC SURGERY | Age: 54
End: 2024-09-24

## 2024-09-24 VITALS — HEIGHT: 66 IN | WEIGHT: 268 LBS | BODY MASS INDEX: 43.07 KG/M2

## 2024-09-24 DIAGNOSIS — S69.81XA INJURY OF TRIANGULAR FIBROCARTILAGE COMPLEX (TFCC) OF RIGHT WRIST, INITIAL ENCOUNTER: Primary | ICD-10-CM

## 2024-09-24 RX ORDER — TRIAMCINOLONE ACETONIDE 40 MG/ML
12 INJECTION, SUSPENSION INTRA-ARTICULAR; INTRAMUSCULAR ONCE
Status: COMPLETED | OUTPATIENT
Start: 2024-09-24 | End: 2024-09-24

## 2024-09-24 RX ADMIN — TRIAMCINOLONE ACETONIDE 12 MG: 40 INJECTION, SUSPENSION INTRA-ARTICULAR; INTRAMUSCULAR at 14:50

## 2024-10-29 ENCOUNTER — OFFICE VISIT (OUTPATIENT)
Dept: ORTHOPEDIC SURGERY | Age: 54
End: 2024-10-29
Payer: COMMERCIAL

## 2024-10-29 VITALS — BODY MASS INDEX: 41.78 KG/M2 | HEIGHT: 66 IN | WEIGHT: 260 LBS

## 2024-10-29 DIAGNOSIS — M17.11 PRIMARY OSTEOARTHRITIS OF RIGHT KNEE: ICD-10-CM

## 2024-10-29 DIAGNOSIS — S83.241A ACUTE MEDIAL MENISCUS TEAR, RIGHT, INITIAL ENCOUNTER: Primary | ICD-10-CM

## 2024-10-29 PROCEDURE — G8417 CALC BMI ABV UP PARAM F/U: HCPCS | Performed by: ORTHOPAEDIC SURGERY

## 2024-10-29 PROCEDURE — G8427 DOCREV CUR MEDS BY ELIG CLIN: HCPCS | Performed by: ORTHOPAEDIC SURGERY

## 2024-10-29 PROCEDURE — 99214 OFFICE O/P EST MOD 30 MIN: CPT | Performed by: ORTHOPAEDIC SURGERY

## 2024-10-29 PROCEDURE — 1036F TOBACCO NON-USER: CPT | Performed by: ORTHOPAEDIC SURGERY

## 2024-10-29 PROCEDURE — 3017F COLORECTAL CA SCREEN DOC REV: CPT | Performed by: ORTHOPAEDIC SURGERY

## 2024-10-29 PROCEDURE — G8484 FLU IMMUNIZE NO ADMIN: HCPCS | Performed by: ORTHOPAEDIC SURGERY

## 2024-10-29 NOTE — PROGRESS NOTES
Chief Complaint   Patient presents with    Follow-up     Patient is here for a pre-operative appointment on her right knee. Patient walking with a limp and still in a lot of pain.       Subjective:     Patient ID: Mar Blanca is a 54 y.o..  female    Knee Pain  Patient complains of right knee pain. She is about the same as when I saw her last. Her pain is located medially.     The patient has failed all attempts at conservative treatment including: cortisone injection, visco injections, zilretta injections, physician directed HEP, nsaids, pain medication, OTC medication, ice, heat, use of assistive device and activity restriction.      Past Medical History:   Diagnosis Date    Allergic rhinitis     Anemia     Anxiety     Arthritis     rheumatoid     Carpal tunnel syndrome     Cerebral artery occlusion with cerebral infarction (HCC)      left hand feels heavy    Cerebrovascular disease 10/2021    History of blood transfusion     x5    Hx of blood clots     vaginal    Menometrorrhagia 11/17/2021    Obesity     Stroke (HCC)      Past Surgical History:   Procedure Laterality Date    CARPAL TUNNEL RELEASE Left 02/12/2021    Left Carpal Tunnel Release    CARPAL TUNNEL RELEASE Left 02/12/2021    LEFT CARPAL TUNNEL RELEASE performed by Sam Figueroa DO at [facility] OR    DILATION AND CURETTAGE OF UTERUS N/A 11/17/2021    DILATATION AND CURETTAGE HYSTEROSCOPY performed by Elías Hodges MD at [facility] OR    HYSTERECTOMY (CERVIX STATUS UNKNOWN) Bilateral 12/01/2021    ROBOTIC XI ASSISTED TOTAL LAPAROSCOPIC HYSTERECTOMY WITH BILATERAL SALPINGECTOMY performed by Elías Hodges MD at [facility] OR    HYSTERECTOMY, VAGINAL  12/2021    PARTIAL HYSTERECTOMY (CERVIX NOT REMOVED)      SINUS SURGERY  2005    TRANSESOPHAGEAL ECHOCARDIOGRAM N/A 01/17/2022    TRANSESOPHAGEAL ECHOCARDIOGRAM WITH BUBBLE STUDY performed by Ariel Sabillon MD at [facility] ENDOSCOPY       Current Outpatient Medications:     olopatadine

## 2024-10-30 ENCOUNTER — TELEPHONE (OUTPATIENT)
Dept: ORTHOPEDIC SURGERY | Age: 54
End: 2024-10-30

## 2024-10-30 NOTE — TELEPHONE ENCOUNTER
Good Morning...    Dr. Figueroa saw Mar in our office yesterday and scheduled her for right knee arthroscopy on 12/20/24.  Can she be medically cleared for surgery?  Please advise.  Thank you and have a great Wedensday!

## 2024-11-21 ENCOUNTER — TELEPHONE (OUTPATIENT)
Dept: ORTHOPEDIC SURGERY | Age: 54
End: 2024-11-21

## 2024-11-21 ENCOUNTER — PREP FOR PROCEDURE (OUTPATIENT)
Dept: ORTHOPEDIC SURGERY | Age: 54
End: 2024-11-21

## 2024-11-21 PROBLEM — S83.241A TEAR OF MEDIAL MENISCUS OF RIGHT KNEE: Status: ACTIVE | Noted: 2024-11-21

## 2024-11-21 NOTE — TELEPHONE ENCOUNTER
Prior Authorization Form:      DEMOGRAPHICS:                     Patient Name:  Mar Blanca  Patient :  1970            Insurance:  Payor: [employer] / Plan: [employer] [address] / Product Type: *No Product type* /   Insurance ID Number:    Payer/Plan Subscr  Sex Relation Sub. Ins. ID Effective Group Num   1. [employer]* OJS BLANCA* 1970 Female Self [id number]                                   [address]         DIAGNOSIS & PROCEDURE:                       Procedure/Operation: RIGHT KNEE ARTHROSOPY MEDIAL MENISECTOMY AND DEBRIDEMENT           CPT Code: 29283    Diagnosis:  RIGHT KNEE DJD    ICD10 Code: S83.241A    Location:  [facility]    Surgeon:  SURY SHAFFER    SCHEDULING INFORMATION:                          Date: 24    Time: TO FOLLOW              Anesthesia:  General                                                       Status:  Outpatient        Special Comments:  NONE       Electronically signed by Shantelle Enriquez ATC on 2024 at 10:32 AM

## 2024-11-25 ENCOUNTER — OFFICE VISIT (OUTPATIENT)
Dept: PRIMARY CARE CLINIC | Age: 54
End: 2024-11-25
Payer: COMMERCIAL

## 2024-11-25 VITALS
SYSTOLIC BLOOD PRESSURE: 132 MMHG | HEART RATE: 74 BPM | DIASTOLIC BLOOD PRESSURE: 78 MMHG | TEMPERATURE: 97.2 F | BODY MASS INDEX: 41.97 KG/M2 | HEIGHT: 66 IN | OXYGEN SATURATION: 94 %

## 2024-11-25 DIAGNOSIS — M05.711 RHEUMATOID ARTHRITIS INVOLVING RIGHT SHOULDER WITH POSITIVE RHEUMATOID FACTOR (HCC): ICD-10-CM

## 2024-11-25 DIAGNOSIS — Z23 NEEDS FLU SHOT: ICD-10-CM

## 2024-11-25 DIAGNOSIS — F43.23 ADJUSTMENT DISORDER WITH MIXED ANXIETY AND DEPRESSED MOOD: ICD-10-CM

## 2024-11-25 DIAGNOSIS — G47.33 OSA (OBSTRUCTIVE SLEEP APNEA): ICD-10-CM

## 2024-11-25 DIAGNOSIS — E66.01 MORBID OBESITY: ICD-10-CM

## 2024-11-25 DIAGNOSIS — L91.8 SKIN TAG: ICD-10-CM

## 2024-11-25 DIAGNOSIS — M79.89 LEFT LEG SWELLING: ICD-10-CM

## 2024-11-25 DIAGNOSIS — J33.9 NASAL POLYP: ICD-10-CM

## 2024-11-25 DIAGNOSIS — E78.2 MIXED HYPERLIPIDEMIA: ICD-10-CM

## 2024-11-25 DIAGNOSIS — Z01.818 PRE-OP EXAM: Primary | ICD-10-CM

## 2024-11-25 DIAGNOSIS — Z86.73 HISTORY OF STROKE: ICD-10-CM

## 2024-11-25 PROCEDURE — G8427 DOCREV CUR MEDS BY ELIG CLIN: HCPCS | Performed by: INTERNAL MEDICINE

## 2024-11-25 PROCEDURE — G8484 FLU IMMUNIZE NO ADMIN: HCPCS | Performed by: INTERNAL MEDICINE

## 2024-11-25 PROCEDURE — 99214 OFFICE O/P EST MOD 30 MIN: CPT | Performed by: INTERNAL MEDICINE

## 2024-11-25 PROCEDURE — 1036F TOBACCO NON-USER: CPT | Performed by: INTERNAL MEDICINE

## 2024-11-25 PROCEDURE — 3017F COLORECTAL CA SCREEN DOC REV: CPT | Performed by: INTERNAL MEDICINE

## 2024-11-25 PROCEDURE — 93000 ELECTROCARDIOGRAM COMPLETE: CPT | Performed by: INTERNAL MEDICINE

## 2024-11-25 PROCEDURE — G8417 CALC BMI ABV UP PARAM F/U: HCPCS | Performed by: INTERNAL MEDICINE

## 2024-11-25 RX ORDER — SEMAGLUTIDE 1.34 MG/ML
1 INJECTION, SOLUTION SUBCUTANEOUS
Qty: 3 ML | Refills: 5 | Status: SHIPPED | OUTPATIENT
Start: 2024-11-25

## 2024-11-25 RX ORDER — FLUTICASONE PROPIONATE 50 MCG
2 SPRAY, SUSPENSION (ML) NASAL DAILY
Qty: 48 G | Refills: 1 | Status: SHIPPED | OUTPATIENT
Start: 2024-11-25

## 2024-11-25 RX ORDER — SEMAGLUTIDE 1.34 MG/ML
INJECTION, SOLUTION SUBCUTANEOUS
Qty: 1.5 ML | Refills: 0 | Status: SHIPPED | OUTPATIENT
Start: 2024-11-25

## 2024-11-25 SDOH — ECONOMIC STABILITY: FOOD INSECURITY: WITHIN THE PAST 12 MONTHS, YOU WORRIED THAT YOUR FOOD WOULD RUN OUT BEFORE YOU GOT MONEY TO BUY MORE.: NEVER TRUE

## 2024-11-25 SDOH — ECONOMIC STABILITY: FOOD INSECURITY: WITHIN THE PAST 12 MONTHS, THE FOOD YOU BOUGHT JUST DIDN'T LAST AND YOU DIDN'T HAVE MONEY TO GET MORE.: NEVER TRUE

## 2024-11-25 SDOH — ECONOMIC STABILITY: INCOME INSECURITY: HOW HARD IS IT FOR YOU TO PAY FOR THE VERY BASICS LIKE FOOD, HOUSING, MEDICAL CARE, AND HEATING?: NOT HARD AT ALL

## 2024-11-25 ASSESSMENT — ENCOUNTER SYMPTOMS
EYE ITCHING: 0
APNEA: 1
EYE REDNESS: 0
SINUS PAIN: 0
EYE DISCHARGE: 0
FACIAL SWELLING: 0
EYE PAIN: 0
ALLERGIC/IMMUNOLOGIC NEGATIVE: 1
EYES NEGATIVE: 1
GASTROINTESTINAL NEGATIVE: 1
RHINORRHEA: 0

## 2024-11-25 NOTE — PROGRESS NOTES
apnea)  -     CBC; Future  8. Morbid obesity  -     CBC; Future  -     Semaglutide,0.25 or 0.5MG/DOS, (OZEMPIC, 0.25 OR 0.5 MG/DOSE,) 2 MG/1.5ML SOPN; 0.25 mg subcu weekly for 4 weeks then 0.5 mg subcu weekly for 2 weeks, Disp-1.5 mL, R-0Normal  -     Semaglutide, 1 MG/DOSE, (OZEMPIC, 1 MG/DOSE,) 4 MG/3ML SOPN sc injection; Inject 1 mg into the skin every 7 days After finish the starter dose, Disp-3 mL, R-5Normal  9. Needs flu shot  10. Left leg swelling  -     External Referral To Vascular Surgery  11. Skin tag  -     [facility] - Efren Brooks MD, Dermatology, [place]  Medical clearance for surgery will hold off starting Ozempic until after the surgery hold aspirin per recommendations of Ortho  No follow-ups on file.       An electronic signature was used to authenticate this note.    --Valentin Becerra MD

## 2024-11-26 PROBLEM — M05.9 SEROPOSITIVE RHEUMATOID ARTHRITIS (HCC): Status: ACTIVE | Noted: 2024-11-26

## 2024-11-26 PROBLEM — D50.9 IRON DEFICIENCY ANEMIA: Status: ACTIVE | Noted: 2024-11-26

## 2024-11-26 PROBLEM — F41.9 ANXIETY DISORDER: Status: ACTIVE | Noted: 2024-11-26

## 2024-11-26 PROBLEM — M47.812 SPONDYLOSIS OF CERVICAL SPINE: Status: ACTIVE | Noted: 2024-11-26

## 2024-12-03 ENCOUNTER — APPOINTMENT (OUTPATIENT)
Dept: GENERAL RADIOLOGY | Age: 54
End: 2024-12-03
Payer: COMMERCIAL

## 2024-12-03 ENCOUNTER — HOSPITAL ENCOUNTER (EMERGENCY)
Age: 54
Discharge: HOME | End: 2024-12-03
Attending: EMERGENCY MEDICINE
Payer: COMMERCIAL

## 2024-12-03 VITALS
SYSTOLIC BLOOD PRESSURE: 158 MMHG | DIASTOLIC BLOOD PRESSURE: 108 MMHG | TEMPERATURE: 97.6 F | WEIGHT: 274 LBS | OXYGEN SATURATION: 96 % | BODY MASS INDEX: 44.22 KG/M2 | RESPIRATION RATE: 20 BRPM | HEART RATE: 85 BPM

## 2024-12-03 DIAGNOSIS — M25.531 RIGHT WRIST PAIN: Primary | ICD-10-CM

## 2024-12-03 PROCEDURE — 99284 EMERGENCY DEPT VISIT MOD MDM: CPT

## 2024-12-03 PROCEDURE — 73110 X-RAY EXAM OF WRIST: CPT

## 2024-12-03 PROCEDURE — 96372 THER/PROPH/DIAG INJ SC/IM: CPT

## 2024-12-03 PROCEDURE — 6360000002 HC RX W HCPCS: Performed by: EMERGENCY MEDICINE

## 2024-12-03 RX ORDER — HYDROCODONE BITARTRATE AND ACETAMINOPHEN 5; 325 MG/1; MG/1
1 TABLET ORAL EVERY 6 HOURS PRN
Qty: 10 TABLET | Refills: 0 | Status: SHIPPED | OUTPATIENT
Start: 2024-12-03 | End: 2024-12-06

## 2024-12-03 RX ORDER — KETOROLAC TROMETHAMINE 30 MG/ML
30 INJECTION, SOLUTION INTRAMUSCULAR; INTRAVENOUS ONCE
Status: COMPLETED | OUTPATIENT
Start: 2024-12-03 | End: 2024-12-03

## 2024-12-03 RX ADMIN — KETOROLAC TROMETHAMINE 30 MG: 30 INJECTION, SOLUTION INTRAMUSCULAR at 18:47

## 2024-12-03 ASSESSMENT — LIFESTYLE VARIABLES: HOW OFTEN DO YOU HAVE A DRINK CONTAINING ALCOHOL: NEVER

## 2024-12-03 NOTE — ED PROVIDER NOTES
[facility] EMERGENCY DEPARTMENT  EMERGENCY DEPARTMENT ENCOUNTER      Pt Name: Mar Blanca  MRN: [id number]  Birthdate 1970  Date of evaluation: 12/3/2024  Provider: Ken Packer DO  PCP: Valentin Becerra MD  Note Started: 6:46 PM EST 12/3/24    CHIEF COMPLAINT       Chief Complaint   Patient presents with    Wrist Pain     Chronic right wrist pain but worse today, c/o paresthesia and pain up to right elbow       HISTORY OF PRESENT ILLNESS: 1 or more Elements   History From: Patient  Limitations to history : None    Mar Blanca is a 54 y.o. female who presents to the ED for evaluation of wrist pain. Patient states that for the past several days she has beenh aving worsnening pain to her right wrist. She states that she has no trauma. She states that her fingers feel more swollen. Patient states that she has some parathesias going into her elbow. She states that she has tried wearing a brace without much relief.    Nursing Notes were all reviewed and agreed with or any disagreements were addressed in the HPI.    REVIEW OF SYSTEMS :    Positives and Pertinent negatives as per HPI.     SURGICAL HISTORY     Past Surgical History:   Procedure Laterality Date    CARPAL TUNNEL RELEASE Left 02/12/2021    Left Carpal Tunnel Release    CARPAL TUNNEL RELEASE Left 02/12/2021    LEFT CARPAL TUNNEL RELEASE performed by Sam Figueroa DO at [facility] OR    DILATION AND CURETTAGE OF UTERUS N/A 11/17/2021    DILATATION AND CURETTAGE HYSTEROSCOPY performed by Elías Hodges MD at [facility] OR    HYSTERECTOMY (CERVIX STATUS UNKNOWN) Bilateral 12/01/2021    ROBOTIC XI ASSISTED TOTAL LAPAROSCOPIC HYSTERECTOMY WITH BILATERAL SALPINGECTOMY performed by Elías Hodges MD at [facility] OR    HYSTERECTOMY, VAGINAL  12/2021    PARTIAL HYSTERECTOMY (CERVIX NOT REMOVED)      SINUS SURGERY  2005    TRANSESOPHAGEAL ECHOCARDIOGRAM N/A 01/17/2022    TRANSESOPHAGEAL ECHOCARDIOGRAM WITH BUBBLE

## 2024-12-17 ENCOUNTER — ANESTHESIA EVENT (OUTPATIENT)
Dept: OPERATING ROOM | Age: 54
End: 2024-12-17
Payer: COMMERCIAL

## 2024-12-17 NOTE — ANESTHESIA PRE PROCEDURE
CARPAL TUNNEL RELEASE Left 02/12/2021    Left Carpal Tunnel Release    CARPAL TUNNEL RELEASE Left 02/12/2021    LEFT CARPAL TUNNEL RELEASE performed by Sam Figueroa DO at [facility] OR    DILATION AND CURETTAGE OF UTERUS N/A 11/17/2021    DILATATION AND CURETTAGE HYSTEROSCOPY performed by Elías Hodges MD at [facility] OR    HYSTERECTOMY (CERVIX STATUS UNKNOWN) Bilateral 12/01/2021    ROBOTIC XI ASSISTED TOTAL LAPAROSCOPIC HYSTERECTOMY WITH BILATERAL SALPINGECTOMY performed by Elías Hodges MD at [facility] OR    HYSTERECTOMY, VAGINAL  12/2021    PARTIAL HYSTERECTOMY (CERVIX NOT REMOVED)      SINUS SURGERY  2005    TRANSESOPHAGEAL ECHOCARDIOGRAM N/A 01/17/2022    TRANSESOPHAGEAL ECHOCARDIOGRAM WITH BUBBLE STUDY performed by Ariel Sabillon MD at [facility] ENDOSCOPY       Social History:    Social History     Tobacco Use    Smoking status: Never    Smokeless tobacco: Never   Substance Use Topics    Alcohol use: Yes     Comment: rare                                Counseling given: Not Answered      Vital Signs (Current):   Vitals:    12/12/24 1513 12/20/24 1103   BP:  (!) 148/85   Pulse:  80   Resp:  14   Temp:  98.4 °F (36.9 °C)   TempSrc:  Skin   SpO2:  94%   Weight: 122.5 kg (270 lb) 122.5 kg (270 lb)   Height: 1.676 m (5' 6\") 1.676 m (5' 6\")                                              BP Readings from Last 3 Encounters:   12/20/24 (!) 148/85   12/03/24 (!) 158/108   11/25/24 132/78       NPO Status: Time of last liquid consumption: 2100                        Time of last solid consumption: 2100                        Date of last liquid consumption: 12/19/24                        Date of last solid food consumption: 12/19/24    BMI:   Wt Readings from Last 3 Encounters:   12/20/24 122.5 kg (270 lb)   12/03/24 124.3 kg (274 lb)   10/29/24 117.9 kg (260 lb)     Body mass index is 43.58 kg/m².    CBC:   Lab Results   Component Value Date/Time    WBC 10.2 04/13/2024 01:36 PM    RBC 4.40 04/13/2024 01:36 PM

## 2024-12-20 ENCOUNTER — HOSPITAL ENCOUNTER (OUTPATIENT)
Age: 54
Setting detail: OUTPATIENT SURGERY
Discharge: HOME | End: 2024-12-20
Attending: ORTHOPAEDIC SURGERY | Admitting: ORTHOPAEDIC SURGERY
Payer: COMMERCIAL

## 2024-12-20 ENCOUNTER — ANESTHESIA (OUTPATIENT)
Dept: OPERATING ROOM | Age: 54
End: 2024-12-20
Payer: COMMERCIAL

## 2024-12-20 VITALS
OXYGEN SATURATION: 95 % | HEART RATE: 75 BPM | RESPIRATION RATE: 16 BRPM | SYSTOLIC BLOOD PRESSURE: 137 MMHG | WEIGHT: 270 LBS | TEMPERATURE: 97.5 F | BODY MASS INDEX: 43.39 KG/M2 | HEIGHT: 66 IN | DIASTOLIC BLOOD PRESSURE: 70 MMHG

## 2024-12-20 DIAGNOSIS — S83.231A COMPLEX TEAR OF MEDIAL MENISCUS OF RIGHT KNEE AS CURRENT INJURY, INITIAL ENCOUNTER: Primary | ICD-10-CM

## 2024-12-20 PROCEDURE — 6360000002 HC RX W HCPCS: Performed by: ORTHOPAEDIC SURGERY

## 2024-12-20 PROCEDURE — 2720000010 HC SURG SUPPLY STERILE: Performed by: ORTHOPAEDIC SURGERY

## 2024-12-20 PROCEDURE — 3700000001 HC ADD 15 MINUTES (ANESTHESIA): Performed by: ORTHOPAEDIC SURGERY

## 2024-12-20 PROCEDURE — 29877 ARTHRS KNEE SURG DBRDMT/SHVG: CPT | Performed by: ORTHOPAEDIC SURGERY

## 2024-12-20 PROCEDURE — 3700000000 HC ANESTHESIA ATTENDED CARE: Performed by: ORTHOPAEDIC SURGERY

## 2024-12-20 PROCEDURE — 6360000002 HC RX W HCPCS

## 2024-12-20 PROCEDURE — 2500000003 HC RX 250 WO HCPCS

## 2024-12-20 PROCEDURE — 7100000001 HC PACU RECOVERY - ADDTL 15 MIN: Performed by: ORTHOPAEDIC SURGERY

## 2024-12-20 PROCEDURE — 3600000003 HC SURGERY LEVEL 3 BASE: Performed by: ORTHOPAEDIC SURGERY

## 2024-12-20 PROCEDURE — 2580000003 HC RX 258: Performed by: ANESTHESIOLOGY

## 2024-12-20 PROCEDURE — 6370000000 HC RX 637 (ALT 250 FOR IP): Performed by: ANESTHESIOLOGY

## 2024-12-20 PROCEDURE — 7100000000 HC PACU RECOVERY - FIRST 15 MIN: Performed by: ORTHOPAEDIC SURGERY

## 2024-12-20 PROCEDURE — 7100000011 HC PHASE II RECOVERY - ADDTL 15 MIN: Performed by: ORTHOPAEDIC SURGERY

## 2024-12-20 PROCEDURE — 2580000003 HC RX 258

## 2024-12-20 PROCEDURE — 3600000013 HC SURGERY LEVEL 3 ADDTL 15MIN: Performed by: ORTHOPAEDIC SURGERY

## 2024-12-20 PROCEDURE — 2709999900 HC NON-CHARGEABLE SUPPLY: Performed by: ORTHOPAEDIC SURGERY

## 2024-12-20 PROCEDURE — 2500000003 HC RX 250 WO HCPCS: Performed by: ORTHOPAEDIC SURGERY

## 2024-12-20 PROCEDURE — 7100000010 HC PHASE II RECOVERY - FIRST 15 MIN: Performed by: ORTHOPAEDIC SURGERY

## 2024-12-20 RX ORDER — SODIUM CHLORIDE 9 MG/ML
INJECTION, SOLUTION INTRAVENOUS PRN
Status: DISCONTINUED | OUTPATIENT
Start: 2024-12-20 | End: 2024-12-20 | Stop reason: HOSPADM

## 2024-12-20 RX ORDER — NALOXONE HYDROCHLORIDE 0.4 MG/ML
INJECTION, SOLUTION INTRAMUSCULAR; INTRAVENOUS; SUBCUTANEOUS PRN
Status: DISCONTINUED | OUTPATIENT
Start: 2024-12-20 | End: 2024-12-20 | Stop reason: HOSPADM

## 2024-12-20 RX ORDER — BUPIVACAINE HYDROCHLORIDE 2.5 MG/ML
INJECTION, SOLUTION EPIDURAL; INFILTRATION; INTRACAUDAL PRN
Status: DISCONTINUED | OUTPATIENT
Start: 2024-12-20 | End: 2024-12-20 | Stop reason: ALTCHOICE

## 2024-12-20 RX ORDER — DEXAMETHASONE SODIUM PHOSPHATE 10 MG/ML
INJECTION, SOLUTION INTRAMUSCULAR; INTRAVENOUS
Status: DISCONTINUED | OUTPATIENT
Start: 2024-12-20 | End: 2024-12-20 | Stop reason: SDUPTHER

## 2024-12-20 RX ORDER — MEPERIDINE HYDROCHLORIDE 25 MG/ML
12.5 INJECTION INTRAMUSCULAR; INTRAVENOUS; SUBCUTANEOUS EVERY 5 MIN PRN
Status: DISCONTINUED | OUTPATIENT
Start: 2024-12-20 | End: 2024-12-20 | Stop reason: HOSPADM

## 2024-12-20 RX ORDER — OXYCODONE AND ACETAMINOPHEN 5; 325 MG/1; MG/1
1 TABLET ORAL ONCE
Status: COMPLETED | OUTPATIENT
Start: 2024-12-20 | End: 2024-12-20

## 2024-12-20 RX ORDER — PROPOFOL 10 MG/ML
INJECTION, EMULSION INTRAVENOUS
Status: DISCONTINUED | OUTPATIENT
Start: 2024-12-20 | End: 2024-12-20 | Stop reason: SDUPTHER

## 2024-12-20 RX ORDER — SODIUM CHLORIDE, SODIUM LACTATE, POTASSIUM CHLORIDE, CALCIUM CHLORIDE 600; 310; 30; 20 MG/100ML; MG/100ML; MG/100ML; MG/100ML
INJECTION, SOLUTION INTRAVENOUS CONTINUOUS
Status: DISCONTINUED | OUTPATIENT
Start: 2024-12-20 | End: 2024-12-20 | Stop reason: HOSPADM

## 2024-12-20 RX ORDER — HYDROCODONE BITARTRATE AND ACETAMINOPHEN 5; 325 MG/1; MG/1
1 TABLET ORAL EVERY 6 HOURS PRN
Qty: 28 TABLET | Refills: 0 | Status: SHIPPED | OUTPATIENT
Start: 2024-12-20 | End: 2024-12-27

## 2024-12-20 RX ORDER — FENTANYL CITRATE 50 UG/ML
INJECTION, SOLUTION INTRAMUSCULAR; INTRAVENOUS
Status: DISCONTINUED | OUTPATIENT
Start: 2024-12-20 | End: 2024-12-20 | Stop reason: SDUPTHER

## 2024-12-20 RX ORDER — ONDANSETRON 2 MG/ML
INJECTION INTRAMUSCULAR; INTRAVENOUS
Status: DISCONTINUED | OUTPATIENT
Start: 2024-12-20 | End: 2024-12-20 | Stop reason: SDUPTHER

## 2024-12-20 RX ORDER — MIDAZOLAM HYDROCHLORIDE 1 MG/ML
INJECTION, SOLUTION INTRAMUSCULAR; INTRAVENOUS
Status: DISCONTINUED | OUTPATIENT
Start: 2024-12-20 | End: 2024-12-20 | Stop reason: SDUPTHER

## 2024-12-20 RX ORDER — SODIUM CHLORIDE 0.9 % (FLUSH) 0.9 %
5-40 SYRINGE (ML) INJECTION PRN
Status: DISCONTINUED | OUTPATIENT
Start: 2024-12-20 | End: 2024-12-20 | Stop reason: HOSPADM

## 2024-12-20 RX ORDER — GLYCOPYRROLATE 0.2 MG/ML
INJECTION INTRAMUSCULAR; INTRAVENOUS
Status: DISCONTINUED | OUTPATIENT
Start: 2024-12-20 | End: 2024-12-20 | Stop reason: SDUPTHER

## 2024-12-20 RX ORDER — PROCHLORPERAZINE EDISYLATE 5 MG/ML
5 INJECTION INTRAMUSCULAR; INTRAVENOUS
Status: DISCONTINUED | OUTPATIENT
Start: 2024-12-20 | End: 2024-12-20 | Stop reason: HOSPADM

## 2024-12-20 RX ORDER — SODIUM CHLORIDE 0.9 % (FLUSH) 0.9 %
5-40 SYRINGE (ML) INJECTION EVERY 12 HOURS SCHEDULED
Status: DISCONTINUED | OUTPATIENT
Start: 2024-12-20 | End: 2024-12-20 | Stop reason: HOSPADM

## 2024-12-20 RX ORDER — SODIUM CHLORIDE, SODIUM LACTATE, POTASSIUM CHLORIDE, CALCIUM CHLORIDE 600; 310; 30; 20 MG/100ML; MG/100ML; MG/100ML; MG/100ML
INJECTION, SOLUTION INTRAVENOUS
Status: DISCONTINUED | OUTPATIENT
Start: 2024-12-20 | End: 2024-12-20 | Stop reason: SDUPTHER

## 2024-12-20 RX ORDER — SODIUM CHLORIDE 9 MG/ML
INJECTION, SOLUTION INTRAVENOUS
Status: DISCONTINUED | OUTPATIENT
Start: 2024-12-20 | End: 2024-12-20 | Stop reason: SDUPTHER

## 2024-12-20 RX ORDER — MAGNESIUM HYDROXIDE 1200 MG/15ML
LIQUID ORAL CONTINUOUS PRN
Status: COMPLETED | OUTPATIENT
Start: 2024-12-20 | End: 2024-12-20

## 2024-12-20 RX ORDER — LIDOCAINE HYDROCHLORIDE 20 MG/ML
INJECTION, SOLUTION INTRAVENOUS
Status: DISCONTINUED | OUTPATIENT
Start: 2024-12-20 | End: 2024-12-20 | Stop reason: SDUPTHER

## 2024-12-20 RX ADMIN — FENTANYL CITRATE 50 MCG: 50 INJECTION, SOLUTION INTRAMUSCULAR; INTRAVENOUS at 12:24

## 2024-12-20 RX ADMIN — MIDAZOLAM 2 MG: 1 INJECTION INTRAMUSCULAR; INTRAVENOUS at 12:24

## 2024-12-20 RX ADMIN — PROPOFOL 200 MG: 10 INJECTION, EMULSION INTRAVENOUS at 12:32

## 2024-12-20 RX ADMIN — LIDOCAINE HYDROCHLORIDE 100 MG: 20 INJECTION, SOLUTION INTRAVENOUS at 12:32

## 2024-12-20 RX ADMIN — GLYCOPYRROLATE 0.2 MG: 0.2 INJECTION INTRAMUSCULAR; INTRAVENOUS at 12:45

## 2024-12-20 RX ADMIN — ONDANSETRON 4 MG: 2 INJECTION, SOLUTION INTRAMUSCULAR; INTRAVENOUS at 12:42

## 2024-12-20 RX ADMIN — SODIUM CHLORIDE, POTASSIUM CHLORIDE, SODIUM LACTATE AND CALCIUM CHLORIDE: 600; 310; 30; 20 INJECTION, SOLUTION INTRAVENOUS at 11:21

## 2024-12-20 RX ADMIN — FENTANYL CITRATE 50 MCG: 50 INJECTION, SOLUTION INTRAMUSCULAR; INTRAVENOUS at 12:32

## 2024-12-20 RX ADMIN — SODIUM CHLORIDE, POTASSIUM CHLORIDE, SODIUM LACTATE AND CALCIUM CHLORIDE: 600; 310; 30; 20 INJECTION, SOLUTION INTRAVENOUS at 12:24

## 2024-12-20 RX ADMIN — DEXAMETHASONE SODIUM PHOSPHATE 10 MG: 10 INJECTION INTRAMUSCULAR; INTRAVENOUS at 12:42

## 2024-12-20 RX ADMIN — OXYCODONE AND ACETAMINOPHEN 1 TABLET: 5; 325 TABLET ORAL at 13:53

## 2024-12-20 RX ADMIN — WATER 2000 MG: 1 INJECTION INTRAMUSCULAR; INTRAVENOUS; SUBCUTANEOUS at 12:27

## 2024-12-20 RX ADMIN — SODIUM CHLORIDE: 9 INJECTION, SOLUTION INTRAVENOUS at 13:05

## 2024-12-20 ASSESSMENT — PAIN - FUNCTIONAL ASSESSMENT
PAIN_FUNCTIONAL_ASSESSMENT: 0-10
PAIN_FUNCTIONAL_ASSESSMENT: 0-10
PAIN_FUNCTIONAL_ASSESSMENT: NONE - DENIES PAIN
PAIN_FUNCTIONAL_ASSESSMENT: 0-10
PAIN_FUNCTIONAL_ASSESSMENT: 0-10
PAIN_FUNCTIONAL_ASSESSMENT: NONE - DENIES PAIN

## 2024-12-20 ASSESSMENT — PAIN DESCRIPTION - DESCRIPTORS
DESCRIPTORS: BURNING;ACHING
DESCRIPTORS: ACHING

## 2024-12-20 ASSESSMENT — ENCOUNTER SYMPTOMS: SHORTNESS OF BREATH: 1

## 2024-12-20 ASSESSMENT — PAIN SCALES - GENERAL: PAINLEVEL_OUTOF10: 7

## 2024-12-20 NOTE — OP NOTE
Operative Note      Patient: Mar Blanca  YOB: 1970  MRN: [id number]    Date of Procedure: 12/20/2024    Pre-Op Diagnosis Codes:      * Tear of medial meniscus of right knee [S83.241A]    Post-Op Diagnosis:  djd knee        Procedure: right knee arthroscopy chondroplasty mfc/patella/trochlea    Surgeon(s):  Sam Figueroa DO    Assistant:   * No surgical staff found *    Anesthesia: General    Estimated Blood Loss (mL): less than 100     Complications: None    Specimens:   * No specimens in log *    Implants:  * No implants in log *      Drains: * No LDAs found *    Findings:  Infection Present At Time Of Surgery (PATOS) (choose all levels that have infection present):  No infection present  Other Findings: as above    Detailed Description of Procedure:   below        ANESTHESIA: general  ESTIMATED BLOOD LOSS: Minimal.   COMPLICATIONS: None.   OPERATIVE PROCEDURE: The patient was taken to the operative suite and was   given general anesthesia. The Right knee was identified with preoperative time-out. I applied a tourniquet to the  thigh, placed the  leg in a legholder, prepped and draped the leg in sterile fashion.  I outlined an   incision along the anteromedial and anterolateral aspects of the knee.  An incision was then made in the anterior medial and lateral aspects of the knee with an 11 blade. A blunt trocar was then inserted within the knee and I carried out a diagnostic arthroscopy.     The patient had evidence of synovitis within the suprapatellar pouch, medial and lateral aspects of the knee.  There was a large suprapatellar, medial and infrapatellar plica.    The patellar surface was grade III  and trochlear surface was grade III.  I then advanced the scope into the intracondylar notch.  The patients ACL/ PCL were intact.   I then advanced the scope into the medial compartment. The medial femoral condyle had grade III-IV   defects measuring 2x3cm.  The medial tibial plateau had  stable defects measuring 0.   There was not tear involving the body and posterior horn of the medial meniscus which was unstable to probing. I then advanced the scope into the lateral compartment compartment. The lateral femoral condyle had stable defects measuring 0.  The lateral tibial plateau had stable defects measuring 0.   There was not tear involving the body and posterior horn of the lateral meniscus which was unstable to probing.      I then established a medial working portal and carried out a tricompartmental   synovectomy removing angry synovium from the suprapatellar pouch, medial and   lateral compartments.  The suprapatellar/medial and infrapatellar plica were removed using the 4-0 shaver.   The articular surface defects involving the mfc/patella/trochlea  were performed using the 4.0 Aggressive Plus shaver in a forward manner, smoothed all unstable articular cartilage.     The incisions were then closed with a 4-0 Prolene in single interrupted fashion. A   sterile dressing was placed on the wound. Patient recovered in the recovery   room without difficulty.       Electronically signed by Sam Figueroa DO on 12/20/2024 at 11:51 AM

## 2024-12-20 NOTE — ANESTHESIA POSTPROCEDURE EVALUATION
Department of Anesthesiology  Postprocedure Note    Patient: Mar Blanca  MRN: [id number]  YOB: 1970  Date of evaluation: 12/20/2024    Procedure Summary       Date: 12/20/24 Room / Location: [address]    Anesthesia Start: 1229 Anesthesia Stop: 1326    Procedure: RIGHT KNEE ARTHROSCOPY MEDIAL MENISECTOMY AND DEBRIDEMENT-12/20/24 (Right) Diagnosis:       Tear of medial meniscus of right knee      (Tear of medial meniscus of right knee [S83.241A])    Surgeons: Sam Figueroa DO Responsible Provider: Randi Alaniz DO    Anesthesia Type: general ASA Status: 2            Anesthesia Type: No value filed.    Grazyna Phase I: Grazyna Score: 10    Grazyna Phase II: Grazyna Score: 10    Anesthesia Post Evaluation    Patient location during evaluation: PACU  Patient participation: complete - patient participated  Level of consciousness: awake and alert  Airway patency: patent  Nausea & Vomiting: no nausea and no vomiting  Cardiovascular status: hemodynamically stable  Respiratory status: acceptable  Hydration status: euvolemic  Pain management: adequate    No notable events documented.

## 2024-12-20 NOTE — H&P
Updated H&P    Chief Complaint   Patient presents with    Follow-up       Patient is here for a pre-operative appointment on her right knee. Patient walking with a limp and still in a lot of pain.         Subjective:     Patient ID: Mar Blanca is a 54 y.o..  female     Knee Pain  Patient complains of right knee pain. She is about the same as when I saw her last. Her pain is located medially.      The patient has failed all attempts at conservative treatment including: cortisone injection, visco injections, zilretta injections, physician directed HEP, nsaids, pain medication, OTC medication, ice, heat, use of assistive device and activity restriction.        Past Medical History        Past Medical History:   Diagnosis Date    Allergic rhinitis      Anemia      Anxiety      Arthritis       rheumatoid     Carpal tunnel syndrome      Cerebral artery occlusion with cerebral infarction (HCC)        left hand feels heavy    Cerebrovascular disease 10/2021    History of blood transfusion       x5    Hx of blood clots       vaginal    Menometrorrhagia 11/17/2021    Obesity      Stroke (HCC)           Past Surgical History         Past Surgical History:   Procedure Laterality Date    CARPAL TUNNEL RELEASE Left 02/12/2021     Left Carpal Tunnel Release    CARPAL TUNNEL RELEASE Left 02/12/2021     LEFT CARPAL TUNNEL RELEASE performed by Sam Figueroa DO at [facility] OR    DILATION AND CURETTAGE OF UTERUS N/A 11/17/2021     DILATATION AND CURETTAGE HYSTEROSCOPY performed by Elías Hodges MD at [facility] OR    HYSTERECTOMY (CERVIX STATUS UNKNOWN) Bilateral 12/01/2021     ROBOTIC XI ASSISTED TOTAL LAPAROSCOPIC HYSTERECTOMY WITH BILATERAL SALPINGECTOMY performed by Elías Hodges MD at [facility] OR    HYSTERECTOMY, VAGINAL   12/2021    PARTIAL HYSTERECTOMY (CERVIX NOT REMOVED)        SINUS SURGERY   2005    TRANSESOPHAGEAL ECHOCARDIOGRAM N/A 01/17/2022     TRANSESOPHAGEAL ECHOCARDIOGRAM WITH BUBBLE STUDY

## 2024-12-20 NOTE — DISCHARGE INSTRUCTIONS
[address] [place] [address]  Phone [phone]      Arthroscopy Post-Op Instructions    [facility]  [phone]  Sam Figueroa D.O.      Percocet given at 1: 53 pm 12/20/24.          Change your operative dressing on post-op day #3. Use Band-Aids over the incisions.    You may shower with soap and water on post-op day #5. Do not soak in the tub.    Use the ice pack on your knee as much as tolerated over the next 2 -3 days. This will help to keep the swelling down and minimize the pain.    You may bend your knee as tolerated.    You are to walk with crutches for 1-2 days. Weight bearing as tolerated, you may stop using the crutches when you are comfortable.    Take one aspirin ( regular strength, 325 mg.), (enteric coated if you have stomach problems) twice a day for 2 weeks.    Take pain medication as prescribed. If you anticipate the need for a refill on your medication and the weekend is approaching, please call the office by noon on Friday. No refills will be called in over the weekend. Do not take TYLENOL or Tylenol products while taking the prescribed pain medicine.    Resume regular diet and medications (unless otherwise directed by your doctor.)    You should have a responsible adult with you for 24 hours.    If any problems occur or if you have any further questions, please call your doctor as soon as possible. If you find that you cannot reach your doctor but feel that your condition needs a doctor’s attention go to an emergency room.             Infection After Surgery: Care Instructions  Overview  After surgery, an infection is always possible. It doesn't mean that the surgery didn't go well.  Because an infection can be serious, your doctor has taken steps to manage it.  Your doctor checked the infection and cleaned it if necessary. Your doctor may have made an opening in the area so that the pus can drain out. You may

## 2024-12-30 ENCOUNTER — TRANSCRIBE ORDERS (OUTPATIENT)
Dept: ADMINISTRATIVE | Age: 54
End: 2024-12-30

## 2024-12-30 DIAGNOSIS — Z12.31 OTHER SCREENING MAMMOGRAM: Primary | ICD-10-CM

## 2025-01-03 ENCOUNTER — OFFICE VISIT (OUTPATIENT)
Dept: ORTHOPEDIC SURGERY | Age: 55
End: 2025-01-03

## 2025-01-03 VITALS — TEMPERATURE: 98 F | WEIGHT: 270 LBS | HEIGHT: 66 IN | BODY MASS INDEX: 43.39 KG/M2

## 2025-01-03 DIAGNOSIS — Z98.890 S/P RIGHT KNEE ARTHROSCOPY: Primary | ICD-10-CM

## 2025-01-03 PROCEDURE — 99024 POSTOP FOLLOW-UP VISIT: CPT

## 2025-01-03 NOTE — PROGRESS NOTES
Subjective:        Mar Blanca is here for follow-up after right knee arthroscopy. Findings at surgery: djd knee.   Pain is controlled with current analgesics.  Medication(s) being used: Norco. She denies fever, wound drainage, increasing redness, pus, increasing pain, increasing swelling. Post op problems reported: none.  She is ambulating without aid.         Objective:           General :    alert, appears stated age, and cooperative   Gait:  Normal.   Sutures:   Sutures out.   Incision:  healing well, no significant drainage, no dehiscence, no significant erythema   Tenderness:  none   Flexion ROM:  full range of motion   Extension ROM:  full range of motion   Effusion:  no   DVT Evaluation:  No evidence of DVT seen on physical exam.           Assessment:     Encounter Diagnosis   Name Primary?    S/P right knee arthroscopy Yes         Plan:      Surgical pictures from the surgery were reveiwed with the patient  HEP  Sutures removed today.  Follow up: prn

## 2025-01-16 ENCOUNTER — OFFICE VISIT (OUTPATIENT)
Dept: ORTHOPEDIC SURGERY | Age: 55
End: 2025-01-16
Payer: COMMERCIAL

## 2025-01-16 VITALS — TEMPERATURE: 98.3 F | BODY MASS INDEX: 42.91 KG/M2 | WEIGHT: 267 LBS | HEIGHT: 66 IN

## 2025-01-16 DIAGNOSIS — G56.01 RIGHT CARPAL TUNNEL SYNDROME: Primary | ICD-10-CM

## 2025-01-16 PROCEDURE — 99213 OFFICE O/P EST LOW 20 MIN: CPT | Performed by: ORTHOPAEDIC SURGERY

## 2025-01-16 PROCEDURE — G8417 CALC BMI ABV UP PARAM F/U: HCPCS | Performed by: ORTHOPAEDIC SURGERY

## 2025-01-16 PROCEDURE — 1036F TOBACCO NON-USER: CPT | Performed by: ORTHOPAEDIC SURGERY

## 2025-01-16 PROCEDURE — G8427 DOCREV CUR MEDS BY ELIG CLIN: HCPCS | Performed by: ORTHOPAEDIC SURGERY

## 2025-01-16 PROCEDURE — 3017F COLORECTAL CA SCREEN DOC REV: CPT | Performed by: ORTHOPAEDIC SURGERY

## 2025-01-16 NOTE — PROGRESS NOTES
nerve compression test (+),  Finklesteins (-), CMC Grind test (-), Piano Key Test(-).   Left:    Phallens sign(-), Tinnells sign (-), Median nerve compression test (-),  Finklesteins (-), CMC Grind test (-), Piano Key Test(-).     Xrays:   None    EMG: Moderate carpal tunnel    Radiographic findings reviewed with patient    Impression:   Encounter Diagnosis   Name Primary?    Right carpal tunnel syndrome Yes           Plan: Natural history and expected course discussed. Questions answered.  Educational materials distributed.  Rest, ice, compression, and elevation (RICE) therapy.  Reduction in offending activity discussed.  OTC analgesics as needed.    I will have her schedule with Dr. Spain for an right carpal tunnel injection.

## 2025-01-21 ENCOUNTER — OFFICE VISIT (OUTPATIENT)
Dept: ORTHOPEDIC SURGERY | Age: 55
End: 2025-01-21

## 2025-01-21 VITALS — BODY MASS INDEX: 42.91 KG/M2 | HEIGHT: 66 IN | WEIGHT: 267 LBS

## 2025-01-21 DIAGNOSIS — G56.01 CARPAL TUNNEL SYNDROME ON RIGHT: Primary | ICD-10-CM

## 2025-01-21 RX ORDER — BETAMETHASONE SODIUM PHOSPHATE AND BETAMETHASONE ACETATE 3; 3 MG/ML; MG/ML
6 INJECTION, SUSPENSION INTRA-ARTICULAR; INTRALESIONAL; INTRAMUSCULAR; SOFT TISSUE ONCE
Status: COMPLETED | OUTPATIENT
Start: 2025-01-21 | End: 2025-01-21

## 2025-01-21 RX ORDER — LIDOCAINE HYDROCHLORIDE 10 MG/ML
1 INJECTION, SOLUTION INFILTRATION; PERINEURAL ONCE
Status: COMPLETED | OUTPATIENT
Start: 2025-01-21 | End: 2025-01-21

## 2025-01-21 RX ADMIN — LIDOCAINE HYDROCHLORIDE 1 ML: 10 INJECTION, SOLUTION INFILTRATION; PERINEURAL at 13:52

## 2025-01-21 RX ADMIN — BETAMETHASONE SODIUM PHOSPHATE AND BETAMETHASONE ACETATE 6 MG: 3; 3 INJECTION, SUSPENSION INTRA-ARTICULAR; INTRALESIONAL; INTRAMUSCULAR; SOFT TISSUE at 13:51

## 2025-01-21 ASSESSMENT — ENCOUNTER SYMPTOMS
ABDOMINAL PAIN: 0
VOMITING: 0
CONSTIPATION: 0
NAUSEA: 0
CHEST TIGHTNESS: 0
SHORTNESS OF BREATH: 0
DIARRHEA: 0
COUGH: 0

## 2025-01-21 NOTE — PROGRESS NOTES
was obtained with vapocoolant cold spray.    Injection/Aspiration: A 25-gauge 1.5-inch needle was advanced from an in-plane ulnar to radial approach into the carpal tunnel adjacent to the median nerve. After visualization of the needle tip in the target area and negative aspiration for blood, a mixture of 1 cc of 1% lidocaine and 1 cc of betamethasone (6 mg/cc) was injected around the median nerve creating a halo of medication around the nerve within the carpal tunnel with excellent sonographic flow. Images of procedure were permanently recorded.    Postprocedure Care: The patient will avoid heavy exertion with the hand/wrist and avoid soaking the hand under water for two days. The patient will contact me with any problems related to the injection.    PATIENT EDUCATION    Ready to learn, no apparent learning barriers were identified; learning preferences include listening. Explained diagnosis and treatment plan; patient expressed understanding of the content.    INFORMED CONSENT    Discussed the risks, benefits, alternatives, and the necessity of other members of the healthcare team participating in the procedure. All questions answered and consent given.    Following denial of allergy and review of potential side effects and complications including but not necessarily limited to infection, allergic reaction, local tissue breakdown, aseptic effusion potentially necessitating aspiration and corticosteroid injection, elevation of blood glucose, injury to soft tissue and/or nerves, and seizure, the patient indicated their understanding and agreed to proceed.    FOLLOW-UP    Follow up with Dr. Figueroa    Electronically signed by Omar Spain DO on 1/21/2025 at 2:06 PM

## 2025-02-08 ENCOUNTER — HOSPITAL ENCOUNTER (OUTPATIENT)
Dept: MAMMOGRAPHY | Age: 55
Discharge: HOME | End: 2025-02-10
Attending: OBSTETRICS & GYNECOLOGY
Payer: COMMERCIAL

## 2025-02-08 DIAGNOSIS — Z12.31 OTHER SCREENING MAMMOGRAM: ICD-10-CM

## 2025-02-08 PROCEDURE — 77067 SCR MAMMO BI INCL CAD: CPT

## 2025-05-06 ENCOUNTER — OFFICE VISIT (OUTPATIENT)
Dept: ORTHOPEDIC SURGERY | Age: 55
End: 2025-05-06
Payer: COMMERCIAL

## 2025-05-06 VITALS — HEIGHT: 66 IN | BODY MASS INDEX: 42.91 KG/M2 | WEIGHT: 267 LBS

## 2025-05-06 DIAGNOSIS — G56.01 RIGHT CARPAL TUNNEL SYNDROME: Primary | ICD-10-CM

## 2025-05-06 PROCEDURE — G8417 CALC BMI ABV UP PARAM F/U: HCPCS | Performed by: ORTHOPAEDIC SURGERY

## 2025-05-06 PROCEDURE — 99214 OFFICE O/P EST MOD 30 MIN: CPT | Performed by: ORTHOPAEDIC SURGERY

## 2025-05-06 PROCEDURE — G8428 CUR MEDS NOT DOCUMENT: HCPCS | Performed by: ORTHOPAEDIC SURGERY

## 2025-05-06 PROCEDURE — 3017F COLORECTAL CA SCREEN DOC REV: CPT | Performed by: ORTHOPAEDIC SURGERY

## 2025-05-06 PROCEDURE — 1036F TOBACCO NON-USER: CPT | Performed by: ORTHOPAEDIC SURGERY

## 2025-05-06 NOTE — PROGRESS NOTES
Chief Complaint   Patient presents with    Wrist Pain     Patient is presenting today for right wrist pain. She has constant numbness in her thumb, index and middle finger. She is wearing her brace for support. She would like to talk about surgery this summer.        Mar Blanca is a 55 y.o. year old  female who presents for follow up right wrist pain. She continues to have numbness and tingling in her radial digits.  She had carpal tunnel injection with minimal relief.     Past Medical History:   Diagnosis Date    Allergic rhinitis     Anemia     Anxiety     Arthritis     rheumatoid     Carpal tunnel syndrome     Cerebral artery occlusion with cerebral infarction (HCC) 10/2021    Cerebrovascular disease 10/2021    memory issues, fine motor deficit left hand    COVID-19 2021    mild    History of blood transfusion     x5    Menometrorrhagia 11/17/2021    Obesity     JUAN on CPAP      Past Surgical History:   Procedure Laterality Date    CARPAL TUNNEL RELEASE Left 02/12/2021    Left Carpal Tunnel Release    CARPAL TUNNEL RELEASE Left 02/12/2021    LEFT CARPAL TUNNEL RELEASE performed by Sam Figueroa DO at [facility] OR    DILATION AND CURETTAGE OF UTERUS N/A 11/17/2021    DILATATION AND CURETTAGE HYSTEROSCOPY performed by Elías Hodges MD at [facility] OR    HYSTERECTOMY (CERVIX STATUS UNKNOWN) Bilateral 12/01/2021    ROBOTIC XI ASSISTED TOTAL LAPAROSCOPIC HYSTERECTOMY WITH BILATERAL SALPINGECTOMY performed by Elías Hodges MD at [facility] OR    HYSTERECTOMY, VAGINAL  12/2021    KNEE ARTHROSCOPY Right 12/20/2024    RIGHT KNEE ARTHROSCOPY MEDIAL MENISECTOMY AND DEBRIDEMENT-12/20/24 performed by Sam Figueroa DO at [facility] OR    PARTIAL HYSTERECTOMY (CERVIX NOT REMOVED)      SINUS SURGERY  2005    TRANSESOPHAGEAL ECHOCARDIOGRAM N/A 01/17/2022    TRANSESOPHAGEAL ECHOCARDIOGRAM WITH BUBBLE STUDY performed by Ariel Sabillon MD at [facility] ENDOSCOPY       Current Outpatient Medications:

## 2025-05-16 ENCOUNTER — PREP FOR PROCEDURE (OUTPATIENT)
Dept: ORTHOPEDIC SURGERY | Age: 55
End: 2025-05-16

## 2025-05-16 DIAGNOSIS — G56.01 CARPAL TUNNEL SYNDROME OF RIGHT WRIST: ICD-10-CM

## 2025-05-27 ENCOUNTER — OFFICE VISIT (OUTPATIENT)
Dept: FAMILY MEDICINE CLINIC | Age: 55
End: 2025-05-27
Payer: COMMERCIAL

## 2025-05-27 VITALS
OXYGEN SATURATION: 98 % | WEIGHT: 267 LBS | TEMPERATURE: 97.8 F | BODY MASS INDEX: 42.91 KG/M2 | SYSTOLIC BLOOD PRESSURE: 131 MMHG | DIASTOLIC BLOOD PRESSURE: 77 MMHG | HEART RATE: 62 BPM | HEIGHT: 66 IN | RESPIRATION RATE: 19 BRPM

## 2025-05-27 DIAGNOSIS — J30.9 ALLERGIC RHINITIS, UNSPECIFIED SEASONALITY, UNSPECIFIED TRIGGER: ICD-10-CM

## 2025-05-27 DIAGNOSIS — J01.90 ACUTE SINUSITIS, RECURRENCE NOT SPECIFIED, UNSPECIFIED LOCATION: Primary | ICD-10-CM

## 2025-05-27 PROCEDURE — 3017F COLORECTAL CA SCREEN DOC REV: CPT

## 2025-05-27 PROCEDURE — G8427 DOCREV CUR MEDS BY ELIG CLIN: HCPCS

## 2025-05-27 PROCEDURE — G8417 CALC BMI ABV UP PARAM F/U: HCPCS

## 2025-05-27 PROCEDURE — 96372 THER/PROPH/DIAG INJ SC/IM: CPT

## 2025-05-27 PROCEDURE — 99213 OFFICE O/P EST LOW 20 MIN: CPT

## 2025-05-27 PROCEDURE — 1036F TOBACCO NON-USER: CPT

## 2025-05-27 RX ORDER — AZITHROMYCIN 250 MG/1
TABLET, FILM COATED ORAL
Qty: 6 TABLET | Refills: 0 | Status: SHIPPED | OUTPATIENT
Start: 2025-05-27 | End: 2025-06-06

## 2025-05-27 RX ORDER — DEXAMETHASONE SODIUM PHOSPHATE 10 MG/ML
10 INJECTION, SOLUTION INTRA-ARTICULAR; INTRALESIONAL; INTRAMUSCULAR; INTRAVENOUS; SOFT TISSUE ONCE
Status: DISCONTINUED | OUTPATIENT
Start: 2025-05-27 | End: 2025-05-27

## 2025-05-27 RX ORDER — BENZONATATE 200 MG/1
200 CAPSULE ORAL 3 TIMES DAILY PRN
Qty: 30 CAPSULE | Refills: 0 | Status: SHIPPED | OUTPATIENT
Start: 2025-05-27 | End: 2025-05-29 | Stop reason: ALTCHOICE

## 2025-05-27 RX ORDER — DEXAMETHASONE SODIUM PHOSPHATE 10 MG/ML
10 INJECTION, SOLUTION INTRA-ARTICULAR; INTRALESIONAL; INTRAMUSCULAR; INTRAVENOUS; SOFT TISSUE ONCE
Status: COMPLETED | OUTPATIENT
Start: 2025-05-27 | End: 2025-05-27

## 2025-05-27 RX ADMIN — DEXAMETHASONE SODIUM PHOSPHATE 10 MG: 10 INJECTION, SOLUTION INTRA-ARTICULAR; INTRALESIONAL; INTRAMUSCULAR; INTRAVENOUS; SOFT TISSUE at 14:57

## 2025-05-27 NOTE — PROGRESS NOTES
Chief Complaint       Sinus Problem (Sinus pressure and congestion, cough, chest congestion , started three weeks ago)      History of Present Illness   Source of history provided by:  patient.     Mar Blanca is a 55 y.o. old female presenting to the walk in clinic for evaluation of sinus pressure/congestion, productive cough x 3 weeks. Since onset, symptoms have been persistent. Has been taking Delsym OTC with minimal relief. Denies any fever, chills, wheezing, CP, SOB, or GI symptoms. Denies any hx of asthma, COPD, or tobacco use.  Pt has no known sick exposure.    ROS    Unless otherwise stated in this report or unable to obtain because of the patient's clinical or mental status as evidenced by the medical record, this patients's positive and negative responses for Review of Systems, constitutional, psych, eyes, ENT, cardiovascular, respiratory, gastrointestinal, neurological, genitourinary, musculoskeletal, integument systems and systems related to the presenting problem are either stated in the preceding or were not pertinent or were negative for the symptoms and/or complaints related to the medical problem.      Physical Exam         VS:  /77   Pulse 62   Temp 97.8 °F (36.6 °C)   Resp 19   Ht 1.676 m (5' 5.98\")   Wt 121.1 kg (267 lb)   LMP 09/30/2021   SpO2 98%   BMI 43.12 kg/m²    Oxygen Saturation Interpretation: Normal.    Constitutional:  Alert, development consistent with age.  Ears:  External Ears: Bilateral pinna normal. TMs without erythema or perforation bilaterally.  Canals normal bilaterally without swelling or exudate  Nose:  Has congestion of the nasal mucosa. There is no injection to middle turbinates bilaterally. TTP to frontal sinuses  Throat: Mild posterior pharyngeal erythema with mild post nasal drip present.  No exudate or tonsillar hypertrophy noted.    Neck:  Supple. There is no anterior cervical adenopathy.  Lungs: CTAB without wheezes, rales, or

## 2025-05-29 RX ORDER — TERBINAFINE HYDROCHLORIDE 250 MG/1
250 TABLET ORAL DAILY
COMMUNITY
Start: 2025-04-24

## 2025-05-30 ENCOUNTER — ANESTHESIA EVENT (OUTPATIENT)
Dept: OPERATING ROOM | Age: 55
End: 2025-05-30
Payer: COMMERCIAL

## 2025-05-30 NOTE — ANESTHESIA PRE PROCEDURE
Date of last solid food consumption: 06/01/25    BMI:   Wt Readings from Last 3 Encounters:   06/02/25 123.4 kg (272 lb)   05/27/25 121.1 kg (267 lb)   05/06/25 121.1 kg (267 lb)     Body mass index is 43.9 kg/m².    CBC:   Lab Results   Component Value Date/Time    WBC 10.2 04/13/2024 01:36 PM    RBC 4.40 04/13/2024 01:36 PM    HGB 13.3 04/13/2024 01:36 PM    HCT 40.3 04/13/2024 01:36 PM    MCV 91.6 04/13/2024 01:36 PM    RDW 15.1 04/13/2024 01:36 PM     04/13/2024 01:36 PM       CMP:   Lab Results   Component Value Date/Time     04/13/2024 01:36 PM    K 4.3 04/13/2024 01:36 PM    K 3.4 11/14/2021 04:11 PM     04/13/2024 01:36 PM    CO2 27 04/13/2024 01:36 PM    BUN 11 04/13/2024 01:36 PM    CREATININE 0.6 04/13/2024 01:36 PM    GFRAA >60 09/23/2022 08:30 AM    LABGLOM >90 04/13/2024 01:36 PM    GLUCOSE 104 04/13/2024 01:36 PM    CALCIUM 8.5 04/13/2024 01:36 PM    BILITOT 0.6 04/13/2024 01:36 PM    ALKPHOS 71 04/13/2024 01:36 PM    AST 27 04/13/2024 01:36 PM    ALT 29 04/13/2024 01:36 PM       POC Tests: No results for input(s): \"POCGLU\", \"POCNA\", \"POCK\", \"POCCL\", \"POCBUN\", \"POCHEMO\", \"POCHCT\" in the last 72 hours.    Coags:   Lab Results   Component Value Date/Time    APTT 30.0 10/04/2021 06:38 PM       HCG (If Applicable):   Lab Results   Component Value Date    PREGTESTUR NEGATIVE 11/30/2021    PREGSERUM NEGATIVE 11/16/2021        ABGs: No results found for: \"PHART\", \"PO2ART\", \"TCK5BTF\", \"UJC7BSF\", \"BEART\", \"L3MXVYJQ\"     Type & Screen (If Applicable):  No results found for: \"LABABO\"    Drug/Infectious Status (If Applicable):  No results found for: \"HIV\", \"HEPCAB\"    COVID-19 Screening (If Applicable):   Lab Results   Component Value Date/Time    COVID19 Not-Detected 11/22/2023 04:27 PM    COVID19 Not Detected 02/05/2021 06:13 AM           Anesthesia Evaluation  Patient summary reviewed   no history of anesthetic complications:   Airway: Mallampati: II  TM distance: >3 FB

## 2025-06-02 ENCOUNTER — ANESTHESIA (OUTPATIENT)
Dept: OPERATING ROOM | Age: 55
End: 2025-06-02
Payer: COMMERCIAL

## 2025-06-02 ENCOUNTER — HOSPITAL ENCOUNTER (OUTPATIENT)
Age: 55
Setting detail: OUTPATIENT SURGERY
Discharge: HOME | End: 2025-06-02
Attending: ORTHOPAEDIC SURGERY | Admitting: ORTHOPAEDIC SURGERY
Payer: COMMERCIAL

## 2025-06-02 VITALS
TEMPERATURE: 96.8 F | SYSTOLIC BLOOD PRESSURE: 117 MMHG | RESPIRATION RATE: 16 BRPM | BODY MASS INDEX: 43.71 KG/M2 | HEART RATE: 56 BPM | HEIGHT: 66 IN | WEIGHT: 272 LBS | DIASTOLIC BLOOD PRESSURE: 64 MMHG | OXYGEN SATURATION: 97 %

## 2025-06-02 DIAGNOSIS — G56.01 CARPAL TUNNEL SYNDROME OF RIGHT WRIST: Primary | ICD-10-CM

## 2025-06-02 PROCEDURE — 2580000003 HC RX 258: Performed by: ANESTHESIOLOGY

## 2025-06-02 PROCEDURE — 6360000002 HC RX W HCPCS: Performed by: ORTHOPAEDIC SURGERY

## 2025-06-02 PROCEDURE — 3600000012 HC SURGERY LEVEL 2 ADDTL 15MIN: Performed by: ORTHOPAEDIC SURGERY

## 2025-06-02 PROCEDURE — 3700000000 HC ANESTHESIA ATTENDED CARE: Performed by: ORTHOPAEDIC SURGERY

## 2025-06-02 PROCEDURE — 64721 CARPAL TUNNEL SURGERY: CPT | Performed by: ORTHOPAEDIC SURGERY

## 2025-06-02 PROCEDURE — 6360000002 HC RX W HCPCS

## 2025-06-02 PROCEDURE — 3700000001 HC ADD 15 MINUTES (ANESTHESIA): Performed by: ORTHOPAEDIC SURGERY

## 2025-06-02 PROCEDURE — 64999 UNLISTED PX NERVOUS SYSTEM: CPT | Performed by: ANESTHESIOLOGY

## 2025-06-02 PROCEDURE — 7100000011 HC PHASE II RECOVERY - ADDTL 15 MIN: Performed by: ORTHOPAEDIC SURGERY

## 2025-06-02 PROCEDURE — 2500000003 HC RX 250 WO HCPCS: Performed by: ORTHOPAEDIC SURGERY

## 2025-06-02 PROCEDURE — 7100000010 HC PHASE II RECOVERY - FIRST 15 MIN: Performed by: ORTHOPAEDIC SURGERY

## 2025-06-02 PROCEDURE — 3600000002 HC SURGERY LEVEL 2 BASE: Performed by: ORTHOPAEDIC SURGERY

## 2025-06-02 PROCEDURE — 2709999900 HC NON-CHARGEABLE SUPPLY: Performed by: ORTHOPAEDIC SURGERY

## 2025-06-02 RX ORDER — HYDROCODONE BITARTRATE AND ACETAMINOPHEN 5; 325 MG/1; MG/1
1 TABLET ORAL EVERY 6 HOURS PRN
Qty: 28 TABLET | Refills: 0 | Status: SHIPPED | OUTPATIENT
Start: 2025-06-02 | End: 2025-06-09

## 2025-06-02 RX ORDER — LIDOCAINE HYDROCHLORIDE 5 MG/ML
INJECTION, SOLUTION INFILTRATION; INTRAVENOUS
Status: DISCONTINUED | OUTPATIENT
Start: 2025-06-02 | End: 2025-06-02 | Stop reason: SDUPTHER

## 2025-06-02 RX ORDER — PROPOFOL 10 MG/ML
INJECTION, EMULSION INTRAVENOUS
Status: DISCONTINUED | OUTPATIENT
Start: 2025-06-02 | End: 2025-06-02 | Stop reason: SDUPTHER

## 2025-06-02 RX ORDER — MIDAZOLAM HYDROCHLORIDE 1 MG/ML
INJECTION, SOLUTION INTRAMUSCULAR; INTRAVENOUS
Status: DISCONTINUED | OUTPATIENT
Start: 2025-06-02 | End: 2025-06-02 | Stop reason: SDUPTHER

## 2025-06-02 RX ORDER — FENTANYL CITRATE 50 UG/ML
INJECTION, SOLUTION INTRAMUSCULAR; INTRAVENOUS
Status: DISCONTINUED | OUTPATIENT
Start: 2025-06-02 | End: 2025-06-02 | Stop reason: SDUPTHER

## 2025-06-02 RX ORDER — CEFAZOLIN SODIUM 1 G/3ML
INJECTION, POWDER, FOR SOLUTION INTRAMUSCULAR; INTRAVENOUS
Status: DISCONTINUED | OUTPATIENT
Start: 2025-06-02 | End: 2025-06-02 | Stop reason: SDUPTHER

## 2025-06-02 RX ORDER — SODIUM CHLORIDE, SODIUM LACTATE, POTASSIUM CHLORIDE, CALCIUM CHLORIDE 600; 310; 30; 20 MG/100ML; MG/100ML; MG/100ML; MG/100ML
INJECTION, SOLUTION INTRAVENOUS CONTINUOUS
Status: DISCONTINUED | OUTPATIENT
Start: 2025-06-02 | End: 2025-06-02 | Stop reason: HOSPADM

## 2025-06-02 RX ORDER — ONDANSETRON 2 MG/ML
INJECTION INTRAMUSCULAR; INTRAVENOUS
Status: DISCONTINUED | OUTPATIENT
Start: 2025-06-02 | End: 2025-06-02 | Stop reason: SDUPTHER

## 2025-06-02 RX ORDER — BUPIVACAINE HYDROCHLORIDE 2.5 MG/ML
INJECTION, SOLUTION EPIDURAL; INFILTRATION; INTRACAUDAL; PERINEURAL PRN
Status: DISCONTINUED | OUTPATIENT
Start: 2025-06-02 | End: 2025-06-02 | Stop reason: ALTCHOICE

## 2025-06-02 RX ORDER — HYDROCODONE BITARTRATE AND ACETAMINOPHEN 5; 325 MG/1; MG/1
1 TABLET ORAL PRN
Status: CANCELLED | OUTPATIENT
Start: 2025-06-02

## 2025-06-02 RX ORDER — LIDOCAINE HYDROCHLORIDE 5 MG/ML
INJECTION, SOLUTION INFILTRATION; INTRAVENOUS
Status: DISCONTINUED | OUTPATIENT
Start: 2025-06-02 | End: 2025-06-02

## 2025-06-02 RX ADMIN — SODIUM CHLORIDE, POTASSIUM CHLORIDE, SODIUM LACTATE AND CALCIUM CHLORIDE: 600; 310; 30; 20 INJECTION, SOLUTION INTRAVENOUS at 06:21

## 2025-06-02 RX ADMIN — MIDAZOLAM 2 MG: 1 INJECTION INTRAMUSCULAR; INTRAVENOUS at 06:30

## 2025-06-02 RX ADMIN — PROPOFOL 40 MG: 10 INJECTION, EMULSION INTRAVENOUS at 06:57

## 2025-06-02 RX ADMIN — PROPOFOL 50 MG: 10 INJECTION, EMULSION INTRAVENOUS at 06:46

## 2025-06-02 RX ADMIN — ONDANSETRON 4 MG: 2 INJECTION, SOLUTION INTRAMUSCULAR; INTRAVENOUS at 07:06

## 2025-06-02 RX ADMIN — FENTANYL CITRATE 75 MCG: 50 INJECTION, SOLUTION INTRAMUSCULAR; INTRAVENOUS at 06:32

## 2025-06-02 RX ADMIN — CEFAZOLIN SODIUM 2 G: 1 POWDER, FOR SOLUTION INTRAMUSCULAR; INTRAVENOUS at 06:57

## 2025-06-02 RX ADMIN — PROPOFOL 50 MG: 10 INJECTION, EMULSION INTRAVENOUS at 07:01

## 2025-06-02 RX ADMIN — WATER 2000 MG: 1 INJECTION INTRAMUSCULAR; INTRAVENOUS; SUBCUTANEOUS at 06:30

## 2025-06-02 RX ADMIN — FENTANYL CITRATE 25 MCG: 50 INJECTION, SOLUTION INTRAMUSCULAR; INTRAVENOUS at 06:40

## 2025-06-02 RX ADMIN — PROPOFOL 50 MG: 10 INJECTION, EMULSION INTRAVENOUS at 06:42

## 2025-06-02 RX ADMIN — FENTANYL CITRATE 50 MCG: 50 INJECTION, SOLUTION INTRAMUSCULAR; INTRAVENOUS at 06:52

## 2025-06-02 RX ADMIN — LIDOCAINE HYDROCHLORIDE 30 ML: 5 INJECTION, SOLUTION INFILTRATION at 06:53

## 2025-06-02 RX ADMIN — PROPOFOL 50 MG: 10 INJECTION, EMULSION INTRAVENOUS at 06:32

## 2025-06-02 RX ADMIN — PROPOFOL 50 MG: 10 INJECTION, EMULSION INTRAVENOUS at 06:38

## 2025-06-02 RX ADMIN — FENTANYL CITRATE 50 MCG: 50 INJECTION, SOLUTION INTRAMUSCULAR; INTRAVENOUS at 07:01

## 2025-06-02 RX ADMIN — PROPOFOL 30 MG: 10 INJECTION, EMULSION INTRAVENOUS at 06:53

## 2025-06-02 RX ADMIN — PROPOFOL 40 MG: 10 INJECTION, EMULSION INTRAVENOUS at 07:10

## 2025-06-02 RX ADMIN — SODIUM CHLORIDE, POTASSIUM CHLORIDE, SODIUM LACTATE AND CALCIUM CHLORIDE: 600; 310; 30; 20 INJECTION, SOLUTION INTRAVENOUS at 05:45

## 2025-06-02 ASSESSMENT — PAIN - FUNCTIONAL ASSESSMENT
PAIN_FUNCTIONAL_ASSESSMENT: NONE - DENIES PAIN
PAIN_FUNCTIONAL_ASSESSMENT: 0-10

## 2025-06-02 NOTE — ANESTHESIA PROCEDURE NOTES
Peripheral Block    Patient location during procedure: holding area  Reason for block: at surgeon's request  Start time: 6/2/2025 6:53 AM  Staffing  Other anesthesia staff: Kwan Bonner APRN - CRNA  Performed by: Kwan Bonner APRN - CRNA  Authorized by: Randi Alaniz DO    Preanesthetic Checklist  Completed: patient identified, IV checked, site marked, risks and benefits discussed, surgical/procedural consents, equipment checked, pre-op evaluation, timeout performed, anesthesia consent given, oxygen available and monitors applied/VS acknowledged  Peripheral Block   Patient position: supine  Prep: ChloraPrep  Provider prep: sterile gloves and mask  Patient monitoring: cardiac monitor, continuous pulse ox and IV access  Block type: Art block  Laterality: right  Injection technique: catheter  Guidance: ultrasound guided    Assessment   Injection assessment: negative aspiration for heme, local visualized surrounding nerve on ultrasound and no paresthesia on injection  Hemodynamics: stable

## 2025-06-02 NOTE — DISCHARGE INSTRUCTIONS
Carpal Tunnel Post-op Instructions    [facility]  [phone]  Sam Figueroa D.O.          Keep dressing clean and dry for 7 days. Change your operative dressing on post-op day #7. Use bandaids over incision site.     Keep hand elevated as much as possible over the next 24-48 hours. Use ice to operative site about 20 min out of every hour. This helps reduce pain and swelling.    You may shower with soap and water on post-op day #1 but keep your incision/dressing clean and dry. After 7 days when dressing is removed, may shower and let water run over incision site. Do not scrub over area.    Non-weight bearing activities with affected hand until post-op appointment.    No lifting over 5 pounds.    May begin moving fingers immediately.  No heavy lifting.  No pushing or pulling.    Take pain medications as prescribed.   If you anticipate the need for a refill on your medication and the weekend is approaching, please call the office by noon on Friday.  No refills will be called in over the weekend.  DO NOT take Tylenol products while taking prescribed pain medicine.    Resume regular diet and medications (unless otherwise directed by your doctor).    You should have a responsible adult with you for 24 hours.    If you have any questions or concerns, please call the office.       If any problems occur or if you have any further questions, please call your doctor as soon as possible.  If you find that you cannot reach your doctor but feel that your condition needs a doctor’s attention go to the emergency room.                Nausea and Vomiting After Surgery: Care Instructions  Your Care Instructions     After you've had surgery, you may feel sick to your stomach (nauseated) or you may vomit. Sometimes anesthesia can make you feel sick. It's a common side effect and often doesn't last long. Pain also can make you feel sick or vomit. After the anesthesia wears off, you may feel pain from the

## 2025-06-02 NOTE — H&P
brain tumor,(-) TIA, (-)stroke, (-)headaches, (-)Parkinson disease,(-) memory loss, (-) LOC.  Cardiovascular: (-) Chest pain, (-) swelling in legs/feet, (-) SOB, (-) cramping in legs/feet with walking.  Respiratory: (-) SOB, (-) Coughing, (-) night sweats.  GI: (-) nausea, (-) vomiting, (-) diarrhea, (-) blood in stool, (-) gastric ulcer.  Psychiatric: (-) Depression, (-) Anxiety, (-) bipolar disease, (-) Alzheimer's Disease  Allergic/Immunologic: (-) allergies latex, (-) allergies metal, (-) skin sensitivity.  Hematlogic: (-) anemia, (-) blood transfusion, (-) DVT/PE, (-) Clotting disorders        Subjective:     Constitution:  /72   Pulse 58   Temp 96.8 °F (36 °C) (Temporal)   Resp 20   Ht 1.676 m (5' 6\")   Wt 123.4 kg (272 lb)   LMP 09/30/2021   SpO2 100%   BMI 43.90 kg/m²      Vital signs are stable.  In general, patient is awake, alert and oriented X3, in no apparent distress.  Examination of HENT reveals normocephalic, atraumatic.  PERRLA/EOMI sclera are white.  Conjunctivae are clear.  TM's are intact.  Pharynx is pink and moist.  Uvula and tongue are midline.  Heart: Positive S1 and positive S2 with regular rate and rhythm.  Lungs: Clear to auscultation bilaterally without rales, rhonchi or wheezes.  Abdomen: soft, nontender.  Positive bowel sounds.  No organomegaly.  No guarding or rigidity.     Psycihatric:  The patient is alert and oriented x 3, appears to be stated age and in no distress.       Respiratory:  Respiratory effort is not labored.  Patient is not gasping.  Palpation of the chest reveals no tactile fremitus.     Skin:  Upon inspection: the skin appears warm, dry and intact.  There is not a previous scar over the affected area.There is not any cellulitis, lymphedema or cutaneous lesions noted in the lower extremities.   Upon palpation there is no induration noted.       Neurologic:  Motor exam of the upper extremities show: The reflexes in biceps/triceps/brachioradialis are equal

## 2025-06-02 NOTE — OP NOTE
Operative Note      Patient: Mar Blanca  YOB: 1970  MRN: [id number]    Date of Procedure: 6/2/2025    Pre-Op Diagnosis Codes:      * Carpal tunnel syndrome of right wrist [G56.01]    Post-Op Diagnosis: Same       Procedure(s):  RIGHT WRIST CARPAL TUNNEL RELEASE-6/2/25    Surgeon(s):  Paula Shaffer DO    Assistant:   Resident: Reji Khan DO    Anesthesia: Ho-Ho-Kus Block    Estimated Blood Loss (mL): less than 100     Complications: None    Specimens:   * No specimens in log *    Implants:  * No implants in log *      Drains: * No LDAs found *    Findings:  Infection Present At Time Of Surgery (PATOS) (choose all levels that have infection present):  No infection present  Other Findings: as above    Detailed Description of Procedure:   Below    SURGEON: PAULA SHAFFER D.O.   ASSISTANT: as above  PREOPERATIVE DIAGNOSIS: Right wrist carpal tunnel syndrome.   POSTOPERATIVE DIAGNOSIS: Right wrist carpal tunnel syndrome.   PROCEDURE: Release transverse carpal ligament, Right wrist.   ANESTHESIA: bb  ESTIMATED BLOOD LOSS: mild in degree  COMPLICATIONS: None.     Brief Hospital Course: The  patients well  known to Paula Shaffer DO's practice with persistent complaints of right wrist/hand pain and numbness. Wrsit and hand pain has failed to be relieved by non-operative conservative measures, and has began affecting daily activities of living. After examination of the patient, review of the EMG, radiologic studies, and appropriate pre-operative risk assessment, Paula Shaffer DO recommended right carpal tunnel release,  which the patient was agreeable towards.        OPERATIVE PROCEDURE: The patient was brought to the operating suite and was   given anesthesia. The right arm was identified with a   preoperative time-out, the arm was prepped and draped in sterile fashion, I  outlined incision along the volar side of the wrist just ulnar to the thenar   wrist crease.   I made an approximately 2 to 4-cm

## 2025-06-02 NOTE — ANESTHESIA POSTPROCEDURE EVALUATION
Department of Anesthesiology  Postprocedure Note    Patient: Mar Blanca  MRN: [id number]  YOB: 1970  Date of evaluation: 6/2/2025    Procedure Summary       Date: 06/02/25 Room / Location: [address]    Anesthesia Start: 0630 Anesthesia Stop: 0725    Procedure: RIGHT WRIST CARPAL TUNNEL RELEASE-6/2/25 (Right: Wrist) Diagnosis:       Carpal tunnel syndrome of right wrist      (Carpal tunnel syndrome of right wrist [G56.01])    Surgeons: Sam Figueroa DO Responsible Provider: Randi Alaniz DO    Anesthesia Type: Art block, MAC ASA Status: 3            Anesthesia Type: No value filed.    Grazyna Phase I: Grazyna Score: 10    Grazyna Phase II: Grazyan Score: 10    Anesthesia Post Evaluation    Patient location during evaluation: PACU  Patient participation: complete - patient participated  Level of consciousness: awake and alert  Airway patency: patent  Nausea & Vomiting: no nausea and no vomiting  Cardiovascular status: hemodynamically stable  Respiratory status: acceptable  Hydration status: euvolemic  Pain management: adequate    No notable events documented.

## 2025-06-10 ENCOUNTER — HOSPITAL ENCOUNTER (OUTPATIENT)
Age: 55
Discharge: HOME | End: 2025-06-10
Payer: COMMERCIAL

## 2025-06-10 LAB
ALBUMIN SERPL-MCNC: 4.1 G/DL (ref 3.5–5.2)
ALP SERPL-CCNC: 77 U/L (ref 35–104)
ALT SERPL-CCNC: 21 U/L (ref 0–35)
ANION GAP SERPL CALCULATED.3IONS-SCNC: 10 MMOL/L (ref 7–16)
AST SERPL-CCNC: 23 U/L (ref 0–35)
BASOPHILS # BLD: 0.06 K/UL (ref 0–0.2)
BASOPHILS NFR BLD: 1 % (ref 0–2)
BILIRUB SERPL-MCNC: 0.4 MG/DL (ref 0–1.2)
BUN SERPL-MCNC: 18 MG/DL (ref 6–20)
CALCIUM SERPL-MCNC: 9.2 MG/DL (ref 8.6–10)
CHLORIDE SERPL-SCNC: 102 MMOL/L (ref 98–107)
CO2 SERPL-SCNC: 27 MMOL/L (ref 22–29)
CREAT SERPL-MCNC: 0.7 MG/DL (ref 0.5–1)
EOSINOPHIL # BLD: 0.29 K/UL (ref 0.05–0.5)
EOSINOPHILS RELATIVE PERCENT: 3 % (ref 0–6)
ERYTHROCYTE [DISTWIDTH] IN BLOOD BY AUTOMATED COUNT: 14.6 % (ref 11.5–15)
ERYTHROCYTE [SEDIMENTATION RATE] IN BLOOD BY WESTERGREN METHOD: 27 MM/HR (ref 0–20)
GFR, ESTIMATED: >90 ML/MIN/1.73M2
GLUCOSE SERPL-MCNC: 96 MG/DL (ref 74–99)
HCT VFR BLD AUTO: 40.2 % (ref 34–48)
HGB BLD-MCNC: 13.5 G/DL (ref 11.5–15.5)
IMM GRANULOCYTES # BLD AUTO: 0.05 K/UL (ref 0–0.58)
IMM GRANULOCYTES NFR BLD: 1 % (ref 0–5)
LYMPHOCYTES NFR BLD: 2.54 K/UL (ref 1.5–4)
LYMPHOCYTES RELATIVE PERCENT: 27 % (ref 20–42)
MCH RBC QN AUTO: 30.8 PG (ref 26–35)
MCHC RBC AUTO-ENTMCNC: 33.6 G/DL (ref 32–34.5)
MCV RBC AUTO: 91.8 FL (ref 80–99.9)
MONOCYTES NFR BLD: 0.67 K/UL (ref 0.1–0.95)
MONOCYTES NFR BLD: 7 % (ref 2–12)
NEUTROPHILS NFR BLD: 62 % (ref 43–80)
NEUTS SEG NFR BLD: 5.93 K/UL (ref 1.8–7.3)
PLATELET # BLD AUTO: 324 K/UL (ref 130–450)
PMV BLD AUTO: 9.5 FL (ref 7–12)
POTASSIUM SERPL-SCNC: 4.5 MMOL/L (ref 3.5–5.1)
PROT SERPL-MCNC: 7.5 G/DL (ref 6.4–8.3)
RBC # BLD AUTO: 4.38 M/UL (ref 3.5–5.5)
SODIUM SERPL-SCNC: 139 MMOL/L (ref 136–145)
WBC OTHER # BLD: 9.5 K/UL (ref 4.5–11.5)

## 2025-06-10 PROCEDURE — 80053 COMPREHEN METABOLIC PANEL: CPT

## 2025-06-10 PROCEDURE — 36415 COLL VENOUS BLD VENIPUNCTURE: CPT

## 2025-06-10 PROCEDURE — 85025 COMPLETE CBC W/AUTO DIFF WBC: CPT

## 2025-06-10 PROCEDURE — 85652 RBC SED RATE AUTOMATED: CPT

## 2025-06-16 ENCOUNTER — OFFICE VISIT (OUTPATIENT)
Dept: ORTHOPEDIC SURGERY | Age: 55
End: 2025-06-16
Payer: COMMERCIAL

## 2025-06-16 VITALS — HEIGHT: 66 IN | WEIGHT: 272 LBS | BODY MASS INDEX: 43.71 KG/M2

## 2025-06-16 DIAGNOSIS — M17.11 PRIMARY OSTEOARTHRITIS OF RIGHT KNEE: ICD-10-CM

## 2025-06-16 DIAGNOSIS — Z98.890 S/P CARPAL TUNNEL RELEASE: Primary | ICD-10-CM

## 2025-06-16 PROCEDURE — 99024 POSTOP FOLLOW-UP VISIT: CPT | Performed by: ORTHOPAEDIC SURGERY

## 2025-06-16 PROCEDURE — 20610 DRAIN/INJ JOINT/BURSA W/O US: CPT | Performed by: ORTHOPAEDIC SURGERY

## 2025-06-16 RX ORDER — TRIAMCINOLONE ACETONIDE 40 MG/ML
40 INJECTION, SUSPENSION INTRA-ARTICULAR; INTRAMUSCULAR ONCE
Status: COMPLETED | OUTPATIENT
Start: 2025-06-16 | End: 2025-06-16

## 2025-06-16 RX ADMIN — TRIAMCINOLONE ACETONIDE 40 MG: 40 INJECTION, SUSPENSION INTRA-ARTICULAR; INTRAMUSCULAR at 09:13

## 2025-06-16 NOTE — PROGRESS NOTES
Mar Blanca is here for followup after right carpal tunnel surgery. Pain is controlled without any medications.. The patient notes improvement in the following symptoms:strength, numbness, pain, sensation.  The patient denies fever, wound drainage, increasing redness, pus, increasing pain, increasing swelling. Post op problems reported: none.         Objective:         General :    alert, appears stated age, and cooperative   Sutures:   Sutures out.   Incision:  healing well, no significant drainage, no dehiscence, no significant erythema   Tenderness:  none   Flexion ROM:  full range of motion   Extension ROM:  full range of motion   Effusion:  no         Assessment:        Status post right carpal tunnel surgery. Doing well postoperatively.        Plan:     Sutures removed today.  HEP  Follow up: prn  No heavy lifting, pushing, pulling for 3-4 weeks  Can get incision wet, do not submerge until closed     I will proceed with a cortisone injection in the Right knee.  Verbal and written consent was obtained for the injections. The skin was prepped with alcohol. 1mL of Kenalog 40mg and 9mL of 0.25% Marcaine was  injected to Right knee. The injection was given through the lateral side of the knee. The patient tolerated the injection well. I will see the patient back prn

## 2025-07-09 DIAGNOSIS — G56.01 CARPAL TUNNEL SYNDROME OF RIGHT WRIST: ICD-10-CM

## 2025-07-09 RX ORDER — HYDROCODONE BITARTRATE AND ACETAMINOPHEN 5; 325 MG/1; MG/1
1 TABLET ORAL EVERY 6 HOURS PRN
Qty: 28 TABLET | Refills: 0 | Status: SHIPPED | OUTPATIENT
Start: 2025-07-09 | End: 2025-07-16

## 2025-07-09 NOTE — TELEPHONE ENCOUNTER
.Last appointment 6/16/2025  Next appointment   Future Appointments   Date Time Provider Department Center   8/1/2025  9:45 AM Valentin Becerra MD [facility] DEP      Last refill 06/02/2025  DOS:       Patient called in requesting refill of :    HYDROcodone-acetaminophen (NORCO) 5-325 MG per table     [id number] - [place], [place] - [address] - P [phone] -  [phone]

## 2025-07-31 ENCOUNTER — TRANSCRIBE ORDERS (OUTPATIENT)
Dept: ADMINISTRATIVE | Age: 55
End: 2025-07-31

## 2025-07-31 DIAGNOSIS — I82.210 SUPERIOR VENA CAVA THROMBOSIS (HCC): Primary | ICD-10-CM

## 2025-08-01 ENCOUNTER — HOSPITAL ENCOUNTER (OUTPATIENT)
Dept: LAB | Age: 55
Discharge: HOME | End: 2025-08-01
Payer: COMMERCIAL

## 2025-08-01 ENCOUNTER — HOSPITAL ENCOUNTER (OUTPATIENT)
Dept: CT IMAGING | Age: 55
Discharge: HOME | End: 2025-08-01
Payer: COMMERCIAL

## 2025-08-01 ENCOUNTER — OFFICE VISIT (OUTPATIENT)
Dept: PRIMARY CARE CLINIC | Age: 55
End: 2025-08-01
Payer: COMMERCIAL

## 2025-08-01 VITALS
TEMPERATURE: 98.4 F | WEIGHT: 286 LBS | SYSTOLIC BLOOD PRESSURE: 138 MMHG | BODY MASS INDEX: 45.96 KG/M2 | OXYGEN SATURATION: 94 % | DIASTOLIC BLOOD PRESSURE: 84 MMHG | HEART RATE: 71 BPM | HEIGHT: 66 IN

## 2025-08-01 DIAGNOSIS — Z11.59 NEED FOR HEPATITIS C SCREENING TEST: ICD-10-CM

## 2025-08-01 DIAGNOSIS — M25.562 CHRONIC PAIN OF BOTH KNEES: ICD-10-CM

## 2025-08-01 DIAGNOSIS — E66.01 MORBID OBESITY (HCC): ICD-10-CM

## 2025-08-01 DIAGNOSIS — I82.90 THROMBOSIS: ICD-10-CM

## 2025-08-01 DIAGNOSIS — E78.2 MIXED HYPERLIPIDEMIA: Primary | ICD-10-CM

## 2025-08-01 DIAGNOSIS — G89.29 CHRONIC PAIN OF BOTH KNEES: ICD-10-CM

## 2025-08-01 DIAGNOSIS — G47.33 OSA (OBSTRUCTIVE SLEEP APNEA): ICD-10-CM

## 2025-08-01 DIAGNOSIS — M05.711 RHEUMATOID ARTHRITIS INVOLVING RIGHT SHOULDER WITH POSITIVE RHEUMATOID FACTOR (HCC): ICD-10-CM

## 2025-08-01 DIAGNOSIS — Z11.4 SCREENING FOR HIV WITHOUT PRESENCE OF RISK FACTORS: ICD-10-CM

## 2025-08-01 DIAGNOSIS — M25.561 CHRONIC PAIN OF BOTH KNEES: ICD-10-CM

## 2025-08-01 LAB
BUN SERPL-MCNC: 13 MG/DL (ref 6–20)
CREAT SERPL-MCNC: 0.6 MG/DL (ref 0.5–1)
GFR, ESTIMATED: >90 ML/MIN/1.73M2

## 2025-08-01 PROCEDURE — 82565 ASSAY OF CREATININE: CPT

## 2025-08-01 PROCEDURE — 6360000004 HC RX CONTRAST MEDICATION: Performed by: RADIOLOGY

## 2025-08-01 PROCEDURE — 36415 COLL VENOUS BLD VENIPUNCTURE: CPT

## 2025-08-01 PROCEDURE — 99213 OFFICE O/P EST LOW 20 MIN: CPT | Performed by: INTERNAL MEDICINE

## 2025-08-01 PROCEDURE — 1036F TOBACCO NON-USER: CPT | Performed by: INTERNAL MEDICINE

## 2025-08-01 PROCEDURE — G8427 DOCREV CUR MEDS BY ELIG CLIN: HCPCS | Performed by: INTERNAL MEDICINE

## 2025-08-01 PROCEDURE — 74174 CTA ABD&PLVS W/CONTRAST: CPT

## 2025-08-01 PROCEDURE — 84520 ASSAY OF UREA NITROGEN: CPT

## 2025-08-01 PROCEDURE — G8417 CALC BMI ABV UP PARAM F/U: HCPCS | Performed by: INTERNAL MEDICINE

## 2025-08-01 PROCEDURE — 3017F COLORECTAL CA SCREEN DOC REV: CPT | Performed by: INTERNAL MEDICINE

## 2025-08-01 RX ORDER — IOPAMIDOL 755 MG/ML
75 INJECTION, SOLUTION INTRAVASCULAR
Status: COMPLETED | OUTPATIENT
Start: 2025-08-01 | End: 2025-08-01

## 2025-08-01 RX ORDER — GABAPENTIN 300 MG/1
300 CAPSULE ORAL DAILY
COMMUNITY
Start: 2025-07-08

## 2025-08-01 RX ADMIN — IOPAMIDOL 100 ML: 755 INJECTION, SOLUTION INTRAVENOUS at 16:45

## 2025-08-01 SDOH — ECONOMIC STABILITY: FOOD INSECURITY: WITHIN THE PAST 12 MONTHS, THE FOOD YOU BOUGHT JUST DIDN'T LAST AND YOU DIDN'T HAVE MONEY TO GET MORE.: NEVER TRUE

## 2025-08-01 SDOH — ECONOMIC STABILITY: FOOD INSECURITY: WITHIN THE PAST 12 MONTHS, YOU WORRIED THAT YOUR FOOD WOULD RUN OUT BEFORE YOU GOT MONEY TO BUY MORE.: NEVER TRUE

## 2025-08-01 ASSESSMENT — PATIENT HEALTH QUESTIONNAIRE - PHQ9
SUM OF ALL RESPONSES TO PHQ QUESTIONS 1-9: 0
2. FEELING DOWN, DEPRESSED OR HOPELESS: NOT AT ALL
SUM OF ALL RESPONSES TO PHQ QUESTIONS 1-9: 0
1. LITTLE INTEREST OR PLEASURE IN DOING THINGS: NOT AT ALL

## 2025-08-01 NOTE — PROGRESS NOTES
Mar Blanca (:  1970) is a 55 y.o. female,Established patient, here for evaluation of the following chief complaint(s):  9 Month Follow-Up (She presents to the office today to discuss weight management.), Wrist Pain (She was evaluated and treated on 12/3/2025 in ED regarding right wrist pain.), Knee pain and wrist pain (She follows with Dr. Sam Figueroa regarding right knee pain and CTS. She underwent a right knee arthroscopy on . She underwent CTR on 2025. ), and Health Maintenance (She follows with Dr. Elías Hodges (Gynecology). )      Subjective   SUBJECTIVE/OBJECTIVE:  Wrist Pain       Morbid obese  BMI up to 46.16.  Over the last month she gained 14 pounds probably part of it is due to edema patient is on prednisone has rheumatoid arthritis following up with rheumatologist who offered her to take her off the prednisone patient is afraid to use other modalities of treatment I disagree with her the prednisone have multiple side effects not only weight gain but increased sugar problems and also because thinning of the skin and can cause Cushing syndrome patient also is emotionally by she cries easily  Arthritis both knees  Has been seen by Ortho she is not going to have surgery unless she lose weight this is affecting her activity  Rheumatoid arthritis  Treatment as above she can see her rheumatologist next month I encouraged her to wean off the prednisone under supervision of her rheumatologist and start something like Humira  Review of Systems   All other systems reviewed and are negative.    CBC with Differential:    Lab Results   Component Value Date/Time    WBC 9.5 06/10/2025 04:39 PM    RBC 4.38 06/10/2025 04:39 PM    HGB 13.5 06/10/2025 04:39 PM    HCT 40.2 06/10/2025 04:39 PM     06/10/2025 04:39 PM    MCV 91.8 06/10/2025 04:39 PM    MCH 30.8 06/10/2025 04:39 PM    MCHC 33.6 06/10/2025 04:39 PM    RDW 14.6 06/10/2025 04:39 PM    LYMPHOPCT 27 06/10/2025 04:39 PM

## 2025-08-12 ENCOUNTER — HOSPITAL ENCOUNTER (OUTPATIENT)
Dept: LAB | Age: 55
Discharge: HOME | End: 2025-08-12
Payer: COMMERCIAL

## 2025-08-12 LAB
HAV IGM SERPL QL IA: NONREACTIVE
HBV CORE IGM SERPL QL IA: NONREACTIVE
HBV SURFACE AG SERPL QL IA: NONREACTIVE
HCV AB SERPL QL IA: NONREACTIVE

## 2025-08-12 PROCEDURE — 86481 TB AG RESPONSE T-CELL SUSP: CPT

## 2025-08-12 PROCEDURE — 36415 COLL VENOUS BLD VENIPUNCTURE: CPT

## 2025-08-12 PROCEDURE — 80074 ACUTE HEPATITIS PANEL: CPT

## 2025-08-21 LAB — T SPOT TB TEST: NORMAL

## 2025-08-26 PROBLEM — I63.00 CEREBROVASCULAR ACCIDENT (CVA) DUE TO THROMBOSIS OF PRECEREBRAL ARTERY (HCC): Status: RESOLVED | Noted: 2021-10-29 | Resolved: 2025-08-26

## 2025-09-02 ENCOUNTER — OFFICE VISIT (OUTPATIENT)
Dept: PRIMARY CARE CLINIC | Age: 55
End: 2025-09-02
Payer: COMMERCIAL

## 2025-09-02 VITALS
TEMPERATURE: 97.5 F | SYSTOLIC BLOOD PRESSURE: 132 MMHG | OXYGEN SATURATION: 95 % | DIASTOLIC BLOOD PRESSURE: 80 MMHG | HEIGHT: 66 IN | WEIGHT: 276.6 LBS | BODY MASS INDEX: 44.45 KG/M2 | HEART RATE: 68 BPM

## 2025-09-02 DIAGNOSIS — E78.2 MIXED HYPERLIPIDEMIA: ICD-10-CM

## 2025-09-02 DIAGNOSIS — Z86.73 HISTORY OF STROKE: ICD-10-CM

## 2025-09-02 DIAGNOSIS — Z11.4 SCREENING FOR HIV WITHOUT PRESENCE OF RISK FACTORS: ICD-10-CM

## 2025-09-02 DIAGNOSIS — N83.209 CYST OF OVARY, UNSPECIFIED LATERALITY: ICD-10-CM

## 2025-09-02 DIAGNOSIS — E66.01 MORBID OBESITY (HCC): ICD-10-CM

## 2025-09-02 DIAGNOSIS — M05.711 RHEUMATOID ARTHRITIS INVOLVING RIGHT SHOULDER WITH POSITIVE RHEUMATOID FACTOR (HCC): Primary | ICD-10-CM

## 2025-09-02 PROCEDURE — 3017F COLORECTAL CA SCREEN DOC REV: CPT | Performed by: INTERNAL MEDICINE

## 2025-09-02 PROCEDURE — G8417 CALC BMI ABV UP PARAM F/U: HCPCS | Performed by: INTERNAL MEDICINE

## 2025-09-02 PROCEDURE — 99214 OFFICE O/P EST MOD 30 MIN: CPT | Performed by: INTERNAL MEDICINE

## 2025-09-02 PROCEDURE — G8427 DOCREV CUR MEDS BY ELIG CLIN: HCPCS | Performed by: INTERNAL MEDICINE

## 2025-09-02 PROCEDURE — 1036F TOBACCO NON-USER: CPT | Performed by: INTERNAL MEDICINE

## 2025-09-02 RX ORDER — SULINDAC 150 MG/1
150 TABLET ORAL 2 TIMES DAILY
Qty: 60 TABLET | Refills: 0 | Status: SHIPPED | OUTPATIENT
Start: 2025-09-02

## (undated) DEVICE — LENS CORD GYN 0-DEG 5 MM CIRCON

## (undated) DEVICE — GOWN,SIRUS,NON REINFRCD,LARGE,SET IN SL: Brand: MEDLINE

## (undated) DEVICE — MARKER,SKIN,WI/RULER AND LABELS: Brand: MEDLINE

## (undated) DEVICE — GOWN SIRUS NONREIN XL W/TWL: Brand: MEDLINE INDUSTRIES, INC.

## (undated) DEVICE — ARM DRAPE

## (undated) DEVICE — SCISSORS SURG DIA8MM MPLR CRV ENDOWRIST

## (undated) DEVICE — HANDLE CVR PATENTED RETENTION DISC STRL LIGHT SHLD

## (undated) DEVICE — STRIP,CLOSURE,WOUND,MEDI-STRIP,1/2X4: Brand: MEDLINE

## (undated) DEVICE — INSUFFLATION NEEDLE TO ESTABLISH PNEUMOPERITONEUM.: Brand: INSUFFLATION NEEDLE

## (undated) DEVICE — NEEDLE HYPO 18GA L1.5IN PNK POLYPR HUB S STL REG BVL STR

## (undated) DEVICE — BANDAGE COMPR W6XL12FT SGL LAYERED NO CLSR EXSANGUATION

## (undated) DEVICE — BLADE,STAINLESS-STEEL,15,STRL,DISPOSABLE: Brand: MEDLINE

## (undated) DEVICE — SUTURE ABSRB L6IN L37MM 0 GS-21 GRN 1/2 CIR TAPR PNT NDL VLOCL0306

## (undated) DEVICE — Device: Brand: INSTRUSAFE

## (undated) DEVICE — TIBURON EXTREMITY SHEET: Brand: CONVERTORS

## (undated) DEVICE — NDL CNTR 40CT FM MAG: Brand: MEDLINE INDUSTRIES, INC.

## (undated) DEVICE — 20 ML SYRINGE REGULAR TIP: Brand: MONOJECT

## (undated) DEVICE — BANDAGE,GAUZE,BULKEE II,4.5"X4.1YD,STRL: Brand: MEDLINE

## (undated) DEVICE — 1810 FOAM BLOCK NEEDLE COUNTER: Brand: DEVON

## (undated) DEVICE — GAUZE,SPONGE,4"X4",16PLY,XRAY,STRL,LF: Brand: MEDLINE

## (undated) DEVICE — COVER,LIGHT HANDLE,FLX,1/PK: Brand: MEDLINE INDUSTRIES, INC.

## (undated) DEVICE — CAMERA STRYKER 1488 HD GEN

## (undated) DEVICE — TRAY,VAG PREP,2PR VNYL GLV,4 C: Brand: MEDLINE INDUSTRIES, INC.

## (undated) DEVICE — INTENDED FOR TISSUE SEPARATION, AND OTHER PROCEDURES THAT REQUIRE A SHARP SURGICAL BLADE TO PUNCTURE OR CUT.: Brand: BARD-PARKER ® STAINLESS STEEL BLADES

## (undated) DEVICE — BLADE SHV L13CM DIA4MM TAPR TIP SCIS LIKE CUT OVL OUTER

## (undated) DEVICE — Z INACTIVE USE 2660664 SOLUTION IRRIG 3000ML 0.9% SOD CHL USP UROMATIC PLAS CONT

## (undated) DEVICE — DOUBLE BASIN SET: Brand: MEDLINE INDUSTRIES, INC.

## (undated) DEVICE — PEN: MARKING STD 100/CS: Brand: MEDICAL ACTION INDUSTRIES

## (undated) DEVICE — SET INST DAVINCI XI ACCESSORIES

## (undated) DEVICE — WARMER SCP LAP

## (undated) DEVICE — BLADE CLIPPER GEN PURP NS

## (undated) DEVICE — GLOVE ORANGE PI 8   MSG9080

## (undated) DEVICE — GOWN,SIRUS,NONRNF,SETINSLV,XL,20/CS: Brand: MEDLINE

## (undated) DEVICE — ANTI-FOG SOLUTION WITH FOAM PAD: Brand: DEVON

## (undated) DEVICE — GLOVE ORANGE PI 7 1/2   MSG9075

## (undated) DEVICE — IV EXT SET, 30", 4.0ML, SLIDE CLAMP: Brand: MEDLINE

## (undated) DEVICE — BANDAGE COMPR W6INXL5YD SELF ADH COHESIVE CO FLX

## (undated) DEVICE — TRAY PROCED DILATATION CURETTAGE

## (undated) DEVICE — GARMENT,MEDLINE,DVT,INT,CALF,MED, GEN2: Brand: MEDLINE

## (undated) DEVICE — SCOPE DAVINCI XI 0 DEG W/CORD

## (undated) DEVICE — HOOK LOCK LATEX FREE ELASTIC BANDAGE 3INX5YD

## (undated) DEVICE — Z DISCONTINUED USE 2275686 GLOVE SURG SZ 8 L12IN FNGR THK13MIL WHT ISOLEX POLYISOPRENE

## (undated) DEVICE — NEEDLE HYPO 18GA L1.5IN PNK POLYPR HUB S STL THN WALL FILL

## (undated) DEVICE — TOTAL TRAY, 16FR 10ML SIL FOLEY, URN: Brand: MEDLINE

## (undated) DEVICE — CUFF TOURNIQUET 34 SNG BLADDER DUAL PORT

## (undated) DEVICE — SOLUTION IRRIG 1000ML 09% SOD CHL USP PIC PLAS CONTAINER

## (undated) DEVICE — SYRINGE MED 50ML LUERLOCK TIP

## (undated) DEVICE — GAUZE,SPONGE,4"X4",16PLY,STRL,LF,10/TRAY: Brand: MEDLINE

## (undated) DEVICE — [HIGH FLOW INSUFFLATOR,  DO NOT USE IF PACKAGE IS DAMAGED,  KEEP DRY,  KEEP AWAY FROM SUNLIGHT,  PROTECT FROM HEAT AND RADIOACTIVE SOURCES.]: Brand: PNEUMOSURE

## (undated) DEVICE — BASIC PACK: Brand: CONVERTORS

## (undated) DEVICE — SMARTGOWN BREATHABLE SURGICAL GOWN: Brand: CONVERTORS

## (undated) DEVICE — GLOVE ORTHO 8   MSG9480

## (undated) DEVICE — COVER,LIGHT HANDLE,FLX,2/PK: Brand: MEDLINE INDUSTRIES, INC.

## (undated) DEVICE — TOWEL,OR,DSP,ST,BLUE,STD,6/PK,12PK/CS: Brand: MEDLINE

## (undated) DEVICE — TOWEL OR BLUEE 16X26IN ST 8 PACK ORB08 16X26ORTWL

## (undated) DEVICE — SOLUTION INJ ST H2O VIAFLX PLAS 1000ML CONT FOR DRUG DIL

## (undated) DEVICE — Device: Brand: MEDEX

## (undated) DEVICE — ELECTRODE PT RET AD L9FT HI MOIST COND ADH HYDRGEL CORDED

## (undated) DEVICE — Z CONVERTED USE 2275207 CLOTH PREP W7.5XL7.5IN 2% CHG SKIN ALC AND RNS FREE

## (undated) DEVICE — SOLUTION IRRIG 1000ML 0.9% SOD CHL USP POUR PLAS BTL

## (undated) DEVICE — SUTURE PROL SZ 3-0 L18IN NONABSORBABLE BLU L19MM PS-2 3/8 8687H

## (undated) DEVICE — DRESSING GZ XRFRM 4X4(25/BX 6BX/CS)

## (undated) DEVICE — COUNTER NDL 10 COUNT HLD 20 FOAM BLK SGL MAG

## (undated) DEVICE — CANNULA SEAL

## (undated) DEVICE — STERILE VELCLOSE ELASTIC BANDAGE, 4IN X 10 YARDS: Brand: VELCLOSE

## (undated) DEVICE — SET SURG INSTR DISSECT

## (undated) DEVICE — TROCAR: Brand: KII FIOS FIRST ENTRY

## (undated) DEVICE — SUTURE NONABSORBABLE MONOFILAMENT 4-0 PS-2 18 IN BLU PROLENE 8682H

## (undated) DEVICE — 12 ML SYRINGE,LUER-LOCK TIP: Brand: MONOJECT

## (undated) DEVICE — PROGRASP FORCEPS: Brand: ENDOWRIST

## (undated) DEVICE — SYRINGE MED 10ML LUERLOCK TIP W/O SFTY DISP

## (undated) DEVICE — MICRO TIP WIPE: Brand: DEVON

## (undated) DEVICE — TUBING, SUCTION, 3/16" X 10', STRAIGHT: Brand: MEDLINE

## (undated) DEVICE — COUNTER NDL W1.5XL2.5IN 10 COUNT

## (undated) DEVICE — TIP COVER ACCESSORY

## (undated) DEVICE — Z DISCONTINUED USE 2272124 DRAPE SURG XL N INVASIVE 2 LAYR DISP

## (undated) DEVICE — PACK PROC ORTH LO EXT IX CUST

## (undated) DEVICE — PREP TRAY 10X5X2: Brand: MEDLINE INDUSTRIES, INC.

## (undated) DEVICE — ELECTRO LUBE IS A SINGLE PATIENT USE DEVICE THAT IS INTENDED TO BE USED ON ELECTROSURGICAL ELECTRODES TO REDUCE STICKING.: Brand: KEY SURGICAL ELECTRO LUBE

## (undated) DEVICE — 40586 ADVANCED TRENDELENBURG POSITIONING KIT: Brand: 40586 ADVANCED TRENDELENBURG POSITIONING KIT

## (undated) DEVICE — CYSTO/BLADDER IRRIGATION SET, REGULATING CLAMP

## (undated) DEVICE — TUBING PMP L16FT MAIN DISP FOR AR-6400 AR-6475 Â€“ ORDER MULTIPLES OF 10 EACH

## (undated) DEVICE — SET RUMI WITH RESIN KOH CUPS

## (undated) DEVICE — CLOTH PREP W7.5XL7.5IN 2% CHG SKIN ALC AND RNS FREE

## (undated) DEVICE — PACK PROC 3IN1 W/ L12FT DIA0.25IN REINF SUCT TBNG W50XL901IN

## (undated) DEVICE — TIP IU L10CM DIA6.7MM GRN SIL FLX DISP RUMI II

## (undated) DEVICE — GOWN SURG XL SMS FAB NONREINFORCED RAGLAN SLV HK LOOP CLSR

## (undated) DEVICE — TRAY PROCED HYSTEROSCOPY CIRCON 1

## (undated) DEVICE — CHLORAPREP 26ML ORANGE

## (undated) DEVICE — NEEDLE SPNL L3.5IN PNK HUB S STL REG WALL FIT STYL W/ QNCKE

## (undated) DEVICE — APPLICATOR MEDICATED 26 CC SOLUTION HI LT ORNG CHLORAPREP

## (undated) DEVICE — SOLUTION IV IRRIG POUR BRL 0.9% SODIUM CHL 2F7124

## (undated) DEVICE — COLUMN DRAPE

## (undated) DEVICE — MASTISOL ADHESIVE LIQ 2/3ML

## (undated) DEVICE — PACK,AURORA,LAVH: Brand: MEDLINE

## (undated) DEVICE — BLADELESS OBTURATOR: Brand: WECK VISTA

## (undated) DEVICE — PUMP SUC IRR TBNG L10FT W/ HNDPC ASSEMB STRYKEFLOW 2

## (undated) DEVICE — MEGA SUTURECUT ND: Brand: ENDOWRIST

## (undated) DEVICE — PAD MATERNITY CURITY ADH STRIP DISP

## (undated) DEVICE — PAD,NON-ADHERENT,3X8,STERILE,LF,1/PK: Brand: MEDLINE

## (undated) DEVICE — PADDING CAST W4INXL4YD NONSTERILE COT COHESIVE HND TEARABLE

## (undated) DEVICE — CATHETER,URETHRAL,VINYL,MALE,16",16 FR: Brand: MEDLINE

## (undated) DEVICE — PLUMEPORT LAPAROSCOPIC SMOKE FILTRATION DEVICE: Brand: PLUMEPORT ACTIV

## (undated) DEVICE — VESSEL SEALER EXTEND: Brand: ENDOWRIST

## (undated) DEVICE — SYSTEM ES CUP DIA3CM PNEUMO OCCL BLLN DISP FOR CLIN POS